# Patient Record
Sex: MALE | Race: BLACK OR AFRICAN AMERICAN | Employment: FULL TIME | URBAN - NONMETROPOLITAN AREA
[De-identification: names, ages, dates, MRNs, and addresses within clinical notes are randomized per-mention and may not be internally consistent; named-entity substitution may affect disease eponyms.]

---

## 2022-04-29 ENCOUNTER — APPOINTMENT (OUTPATIENT)
Dept: CT IMAGING | Age: 22
End: 2022-04-29
Payer: COMMERCIAL

## 2022-04-29 ENCOUNTER — HOSPITAL ENCOUNTER (EMERGENCY)
Age: 22
Discharge: HOME | End: 2022-04-29
Attending: EMERGENCY MEDICINE
Payer: COMMERCIAL

## 2022-04-29 VITALS
SYSTOLIC BLOOD PRESSURE: 140 MMHG | HEART RATE: 80 BPM | RESPIRATION RATE: 18 BRPM | TEMPERATURE: 98.6 F | DIASTOLIC BLOOD PRESSURE: 70 MMHG | OXYGEN SATURATION: 100 %

## 2022-04-29 DIAGNOSIS — M25.551 RIGHT HIP PAIN: Primary | ICD-10-CM

## 2022-04-29 LAB
-: NORMAL
ABSOLUTE EOS #: 0.33 K/UL (ref 0–0.44)
ABSOLUTE IMMATURE GRANULOCYTE: <0.03 K/UL (ref 0–0.3)
ABSOLUTE LYMPH #: 2.08 K/UL (ref 1.2–5.2)
ABSOLUTE MONO #: 0.48 K/UL (ref 0.1–1.4)
ALBUMIN SERPL-MCNC: 4.8 G/DL (ref 3.5–5.2)
ALBUMIN/GLOBULIN RATIO: 1.6 (ref 1–2.5)
ALP BLD-CCNC: 104 U/L (ref 40–129)
ALT SERPL-CCNC: 27 U/L (ref 5–41)
ANION GAP SERPL CALCULATED.3IONS-SCNC: 11 MMOL/L (ref 9–17)
AST SERPL-CCNC: 31 U/L
BASOPHILS # BLD: 1 % (ref 0–2)
BASOPHILS ABSOLUTE: 0.03 K/UL (ref 0–0.2)
BILIRUB SERPL-MCNC: 0.39 MG/DL (ref 0.3–1.2)
BILIRUBIN URINE: NEGATIVE
BUN BLDV-MCNC: 15 MG/DL (ref 6–20)
BUN/CREAT BLD: 21 (ref 9–20)
CALCIUM SERPL-MCNC: 9.5 MG/DL (ref 8.6–10.4)
CHLORIDE BLD-SCNC: 103 MMOL/L (ref 98–107)
CO2: 27 MMOL/L (ref 20–31)
COLOR: YELLOW
CREAT SERPL-MCNC: 0.71 MG/DL (ref 0.7–1.2)
EOSINOPHILS RELATIVE PERCENT: 5 % (ref 1–4)
EPITHELIAL CELLS UA: NORMAL /HPF (ref 0–5)
GFR NON-AFRICAN AMERICAN: ABNORMAL ML/MIN
GFR SERPL CREATININE-BSD FRML MDRD: ABNORMAL ML/MIN/{1.73_M2}
GFR SERPL CREATININE-BSD FRML MDRD: ABNORMAL ML/MIN/{1.73_M2}
GLUCOSE BLD-MCNC: 106 MG/DL (ref 70–99)
GLUCOSE URINE: NEGATIVE
HCT VFR BLD CALC: 41.3 % (ref 40.7–50.3)
HEMOGLOBIN: 14 G/DL (ref 13–17)
IMMATURE GRANULOCYTES: 0 %
KETONES, URINE: NEGATIVE
LEUKOCYTE ESTERASE, URINE: NEGATIVE
LYMPHOCYTES # BLD: 34 % (ref 25–45)
MCH RBC QN AUTO: 29.9 PG (ref 25.2–33.5)
MCHC RBC AUTO-ENTMCNC: 33.9 G/DL (ref 28.4–34.8)
MCV RBC AUTO: 88.1 FL (ref 82.6–102.9)
MONOCYTES # BLD: 8 % (ref 2–8)
NITRITE, URINE: NEGATIVE
NRBC AUTOMATED: 0 PER 100 WBC
PDW BLD-RTO: 11.3 % (ref 11.8–14.4)
PH UA: 6.5 (ref 5–9)
PLATELET # BLD: 209 K/UL (ref 138–453)
PMV BLD AUTO: 10.2 FL (ref 8.1–13.5)
POTASSIUM SERPL-SCNC: 3.9 MMOL/L (ref 3.7–5.3)
PROTEIN UA: NEGATIVE
RBC # BLD: 4.69 M/UL (ref 4.21–5.77)
RBC UA: NORMAL /HPF (ref 0–2)
SEG NEUTROPHILS: 53 % (ref 34–64)
SEGMENTED NEUTROPHILS ABSOLUTE COUNT: 3.26 K/UL (ref 1.8–8)
SODIUM BLD-SCNC: 141 MMOL/L (ref 135–144)
SPECIFIC GRAVITY UA: 1.01 (ref 1.01–1.02)
TOTAL PROTEIN: 7.8 G/DL (ref 6.4–8.3)
TURBIDITY: CLEAR
URINE HGB: NEGATIVE
UROBILINOGEN, URINE: NORMAL
WBC # BLD: 6 K/UL (ref 4.5–13.5)
WBC UA: NORMAL /HPF (ref 0–5)

## 2022-04-29 PROCEDURE — 6360000004 HC RX CONTRAST MEDICATION: Performed by: EMERGENCY MEDICINE

## 2022-04-29 PROCEDURE — 99285 EMERGENCY DEPT VISIT HI MDM: CPT

## 2022-04-29 PROCEDURE — 74177 CT ABD & PELVIS W/CONTRAST: CPT

## 2022-04-29 PROCEDURE — 81001 URINALYSIS AUTO W/SCOPE: CPT

## 2022-04-29 PROCEDURE — 2580000003 HC RX 258: Performed by: EMERGENCY MEDICINE

## 2022-04-29 PROCEDURE — 36415 COLL VENOUS BLD VENIPUNCTURE: CPT

## 2022-04-29 PROCEDURE — 80053 COMPREHEN METABOLIC PANEL: CPT

## 2022-04-29 PROCEDURE — 73701 CT LOWER EXTREMITY W/DYE: CPT

## 2022-04-29 PROCEDURE — 85025 COMPLETE CBC W/AUTO DIFF WBC: CPT

## 2022-04-29 RX ORDER — 0.9 % SODIUM CHLORIDE 0.9 %
1000 INTRAVENOUS SOLUTION INTRAVENOUS ONCE
Status: COMPLETED | OUTPATIENT
Start: 2022-04-29 | End: 2022-04-29

## 2022-04-29 RX ADMIN — SODIUM CHLORIDE 1000 ML: 9 INJECTION, SOLUTION INTRAVENOUS at 01:43

## 2022-04-29 RX ADMIN — IOPAMIDOL 100 ML: 755 INJECTION, SOLUTION INTRAVENOUS at 01:51

## 2022-04-29 ASSESSMENT — PAIN DESCRIPTION - FREQUENCY: FREQUENCY: CONTINUOUS

## 2022-04-29 ASSESSMENT — PAIN DESCRIPTION - LOCATION: LOCATION: HIP

## 2022-04-29 ASSESSMENT — PAIN SCALES - GENERAL: PAINLEVEL_OUTOF10: 9

## 2022-04-29 ASSESSMENT — PAIN DESCRIPTION - ORIENTATION: ORIENTATION: RIGHT

## 2022-04-29 ASSESSMENT — PAIN DESCRIPTION - DESCRIPTORS: DESCRIPTORS: SHARP

## 2022-04-29 ASSESSMENT — PAIN - FUNCTIONAL ASSESSMENT: PAIN_FUNCTIONAL_ASSESSMENT: 0-10

## 2022-04-29 NOTE — ED PROVIDER NOTES
[address] [facility] COMPLAINT   Chief Complaint   Patient presents with    Hip Pain     right, started two weeks ago        HPI   Doren Jeans is a 25 y.o. male who presents with an injury to the right thigh, caused by a sports accident 2 weeks ago. He has been using crutches and started walking a few days ago and been feeling better. He then today after riding in the car for a bit had a lot of pain in his right leg and hip area and felt like he could not get up. Pain was moderately severe and worse with movement. It is also worse with palpation of the thigh or belly. Patient had some recent marijuana usage limiting the exactness of his history. REVIEW OF SYSTEMS   Musculoskeletal: + right thigh pain, no other bony or joint injury   Skin: No lacerations or redness  Neurologic: No numbness or focal extremity weakness      PAST MEDICAL & SURGICAL HISTORY   No past medical history on file. No past surgical history on file. CURRENT MEDICATIONS        ALLERGIES   No Known Allergies     SOCIAL & FAMILY HISTORY   Social History     Socioeconomic History    Marital status: Single     Spouse name: Not on file    Number of children: Not on file    Years of education: Not on file    Highest education level: Not on file   Occupational History    Not on file   Tobacco Use    Smoking status: Not on file    Smokeless tobacco: Not on file   Substance and Sexual Activity    Alcohol use: Not on file    Drug use: Not on file    Sexual activity: Not on file   Other Topics Concern    Not on file   Social History Narrative    Not on file     Social Determinants of Health     Financial Resource Strain:     Difficulty of Paying Living Expenses: Not on file   Food Insecurity:     Worried About Running Out of Food in the Last Year: Not on file    Trip of Food in the Last Year: Not on file   Transportation Needs:     Lack of Transportation (Medical):  Not on file    Lack of Transportation (Non-Medical): Not on file   Physical Activity:     Days of Exercise per Week: Not on file    Minutes of Exercise per Session: Not on file   Stress:     Feeling of Stress : Not on file   Social Connections:     Frequency of Communication with Friends and Family: Not on file    Frequency of Social Gatherings with Friends and Family: Not on file    Attends Sikhism Services: Not on file    Active Member of [address] [place] [address] or Organizations: Not on file    Attends Club or Organization Meetings: Not on file    Marital Status: Not on file   Intimate Partner Violence:     Fear of Current or Ex-Partner: Not on file    Emotionally Abused: Not on file    Physically Abused: Not on file    Sexually Abused: Not on file   Housing Stability:     Unable to Pay for Housing in the Last Year: Not on file    Number of [place] in the Last Year: Not on file    Unstable Housing in the Last Year: Not on file     No family history on file. PHYSICAL EXAM   VITAL SIGNS: BP (!) 140/70   Pulse 80   Temp 98.6 °F (37 °C) (Tympanic)   Resp 18   SpO2 100%   Constitutional: Well developed, well nourished  HENT: Atraumatic, airway patent  NECK: Supple   Respiratory: No respiratory distress, no retractions  Abd: RLQ pain on palpation, non distended  Cardiovascular: No JVD  Musculoskeletal: right upper thigh pain with palpation  Vascular: ext warm and well perfusedIntegument: Well hydrated, no rash   Neurologic: Awake alert, normal flow ofspeech   Psychiatric: Cooperative, pleasant affect     RADIOLOGY/PROCEDURES   CT HIP RIGHT W CONTRAST   Preliminary Result   CT ABDOMEN AND PELVIS: Mildly striated attenuation pattern to the right   kidney upper and lower poles without other abnormality identified to the   right kidney or urinary tract. Possibility for pyelonephritis is raised. Clinical and laboratory correlation suggested. Remainder of the exam is unremarkable. Normal appendix.       CT RIGHT HIP: Unremarkable exam.  No CT findings to explain the patient's   symptoms. CT ABDOMEN PELVIS W IV CONTRAST Additional Contrast? None   Preliminary Result   CT ABDOMEN AND PELVIS: Mildly striated attenuation pattern to the right   kidney upper and lower poles without other abnormality identified to the   right kidney or urinary tract. Possibility for pyelonephritis is raised. Clinical and laboratory correlation suggested. Remainder of the exam is unremarkable. Normal appendix. CT RIGHT HIP: Unremarkable exam.  No CT findings to explain the patient's   symptoms. ED COURSE & MEDICAL DECISION MAKING   Pertinent Imaging studies reviewed and interpreted. (See chart for details)     Differential diagnosis: includes but not limited to Arterial Injury/Ischemia, Fracture, Dislocation, Compartment Syndrome, Neurologic Deficit/Injury. MDM: Patient well-appearing. He was worked up and no cause was found for his pain. I encouraged him to pursue MRI with his primary care doctor. FINAL IMPRESSION   1.  Right hip pain        PLAN:   Outpatient treatment, follow-up, and discharge instructions (see EMR)    Electronically signed by: Bhumi Morgan MD, 4/29/2022 4:06 AM          Bhumi Morgan MD  04/29/22 2028

## 2023-09-29 PROBLEM — R00.1 BRADYCARDIA: Status: ACTIVE | Noted: 2023-09-29

## 2023-09-29 PROBLEM — R42 POSTURAL DIZZINESS: Status: ACTIVE | Noted: 2023-09-29

## 2023-09-29 PROBLEM — I44.1 SECOND DEGREE AV BLOCK: Status: ACTIVE | Noted: 2023-09-29

## 2023-09-29 PROBLEM — S89.91XA INJURY OF RIGHT KNEE: Status: ACTIVE | Noted: 2023-09-29

## 2023-09-29 PROBLEM — R94.31 ABNORMAL EKG: Status: ACTIVE | Noted: 2023-09-29

## 2023-10-02 ENCOUNTER — OFFICE VISIT (OUTPATIENT)
Dept: ORTHOPEDIC SURGERY | Facility: HOSPITAL | Age: 23
End: 2023-10-02
Payer: COMMERCIAL

## 2023-10-02 DIAGNOSIS — S83.512A NEW ACL TEAR, LEFT, INITIAL ENCOUNTER: ICD-10-CM

## 2023-10-02 DIAGNOSIS — M25.462 EFFUSION OF LEFT KNEE: Primary | ICD-10-CM

## 2023-10-02 PROCEDURE — 99204 OFFICE O/P NEW MOD 45 MIN: CPT | Performed by: ORTHOPAEDIC SURGERY

## 2023-10-02 PROCEDURE — 99214 OFFICE O/P EST MOD 30 MIN: CPT | Performed by: ORTHOPAEDIC SURGERY

## 2023-10-02 NOTE — PROGRESS NOTES
Subjective     Diego Prasad is a 22 y.o. male self-referred for evaluation and treatment of left knee pain. The pain began on 9/18 after her was running on turf and his toes got caught on the ground causing his leg to twist and hyperextend. He was playing football but denies being hit by another player. He tried to walk after the injury but it felt unstable so he decided not to. He initially had significant pain and swelling which has been improving with icing. He was wearing a straight leg brace that prevented any bending of the knee but he has since switched a brace that allows a mild ROM. The knee remains painful and stiff with any motion and he has not placed any weight on it without the brace. The pain's location is global. The patient is active in football. Treatment to date has been ice, knee sleeve/brace, with some relief.    He reports a history of prior right knee pain but no prior L knee issues.    Outside reports reviewed: imaging reports: positive  joint(s): left knee(s)  Findings: small effusion .    Objective    There were no vitals taken for this visit.   General:    alert   Gait:  Antalgic. The patient cannot bear weight on the injured extremity.   Left Lower Extremity  Hip Palpation:   no tenderness over the greater trochanter   Hip ROM:  100% of normal    Hamstring tightness reveals a popliteal angle to be  15  degrees   Knee Effusion:   2+   Ecchymosis:    mild   Knee ROM:   -7 to 30 degrees with subpatellar crepitance.   Patella:   Patella does track normally.   Patellar apprehension test: absent   Patellar compression test: absent   Tenderness:     Stability:   Lachman's test: positive   Posterior drawer: negative   Medial collateral ligament: negative but guarding significantly   Lateral collateral ligament: negative  but guarding   Rory Test:   negative   Stewart's Test:   Not able to perform due to limited motion and pain   Sensation:    intact to light touch   Pulses:  normal DP and  PT pulses     Imaging:  X-rays: 4 views of the knee demonstrate  effusion, no fractures, no arthrosis .    Assessment/Plan   Left knee effusion, concern for ACL tear and mensical tearing  Discussed with the patient the complex nature of his condition and options for treatment.  Due to significant effusion and positive Lachman test and mechanism of injury, we recommend getting MRI scan of the left knee without contrast.  I certify the medical necessity of the MRI scan to further evaluate the intra-articular structures which cannot be appropriately evaluated with x-rays.  Recommend use of 2 crutches plus hinged knee brace locked in extension as the patient does not have good leg control yet.  Discussed with him the importance of prehabilitation to regain full range of motion and get the effusion to resolve.  Wrote out physical therapy protocol for rehabilitation.  Follow-up after the MRI to discuss further treatment plans.  Use of NSAIDs for pain as needed.  Use brace at nighttime while sleeping.  Continue using crutches.    Follow up: 1-2 after MRI scan is completed

## 2023-10-03 RX ORDER — SERTRALINE HYDROCHLORIDE 25 MG/1
25 TABLET, FILM COATED ORAL DAILY
COMMUNITY
Start: 2023-03-06 | End: 2023-11-07 | Stop reason: ALTCHOICE

## 2023-10-03 RX ORDER — HYDROCORTISONE ACETATE, PRAMOXINE HCL 2.5; 1 G/100G; G/100G
CREAM TOPICAL
COMMUNITY
Start: 2023-02-10 | End: 2023-11-07 | Stop reason: ALTCHOICE

## 2023-10-06 ENCOUNTER — APPOINTMENT (OUTPATIENT)
Dept: PRIMARY CARE | Facility: CLINIC | Age: 23
End: 2023-10-06
Payer: COMMERCIAL

## 2023-10-16 ENCOUNTER — EVALUATION (OUTPATIENT)
Dept: PHYSICAL THERAPY | Facility: HOSPITAL | Age: 23
End: 2023-10-16
Payer: COMMERCIAL

## 2023-10-16 DIAGNOSIS — R26.2 DIFFICULTY WALKING: ICD-10-CM

## 2023-10-16 DIAGNOSIS — R29.898 LOWER EXTREMITY WEAKNESS: ICD-10-CM

## 2023-10-16 DIAGNOSIS — R29.898 WEAKNESS OF LOWER EXTREMITY, UNSPECIFIED LATERALITY: ICD-10-CM

## 2023-10-16 DIAGNOSIS — S83.512A NEW ACL TEAR, LEFT, INITIAL ENCOUNTER: ICD-10-CM

## 2023-10-16 DIAGNOSIS — M62.559 ATROPHY OF QUADRICEPS FEMORIS MUSCLE: ICD-10-CM

## 2023-10-16 DIAGNOSIS — M25.562 ACUTE PAIN OF LEFT KNEE: Primary | ICD-10-CM

## 2023-10-16 DIAGNOSIS — M25.469 KNEE SWELLING: ICD-10-CM

## 2023-10-16 DIAGNOSIS — R26.89 BALANCE PROBLEMS: ICD-10-CM

## 2023-10-16 DIAGNOSIS — M25.662 DECREASED RANGE OF MOTION OF LEFT KNEE: ICD-10-CM

## 2023-10-16 DIAGNOSIS — M25.462 EFFUSION OF LEFT KNEE: ICD-10-CM

## 2023-10-16 PROCEDURE — 97016 VASOPNEUMATIC DEVICE THERAPY: CPT | Mod: GP

## 2023-10-16 PROCEDURE — 97161 PT EVAL LOW COMPLEX 20 MIN: CPT | Mod: GP

## 2023-10-16 PROCEDURE — 97140 MANUAL THERAPY 1/> REGIONS: CPT | Mod: GP

## 2023-10-16 PROCEDURE — 97110 THERAPEUTIC EXERCISES: CPT | Mod: GP

## 2023-10-16 ASSESSMENT — ENCOUNTER SYMPTOMS
LOSS OF SENSATION IN FEET: 0
DEPRESSION: 0
OCCASIONAL FEELINGS OF UNSTEADINESS: 0

## 2023-10-16 ASSESSMENT — PAIN SCALES - GENERAL: PAINLEVEL_OUTOF10: 6

## 2023-10-16 ASSESSMENT — PAIN - FUNCTIONAL ASSESSMENT: PAIN_FUNCTIONAL_ASSESSMENT: 0-10

## 2023-10-16 NOTE — Clinical Note
October 16, 2023     Patient: Diego Prasad   YOB: 2000   Date of Visit: 10/16/2023       To Whom It May Concern:    It is my medical opinion that Diego Prasad {Work release (duty restriction):16255}.    If you have any questions or concerns, please don't hesitate to call.         Sincerely,        Enriqueta Almonte, PT    CC: No Recipients

## 2023-10-16 NOTE — PROGRESS NOTES
Physical Therapy  Physical Therapy Orthopedic Evaluation    Patient Name: Diego Prasad  MRN: [id number]  Today's Date: 10/16/2023       Insurance:  Visit number: 1 of 22 (25 visits/year, has used 3)  Authorization info: no auth  Insurance Type: [facility] choice+ vaso ok    General:  Reason for visit: L knee injury  Referred by: Dr. Marquez    Current Problem  1. Acute pain of left knee  Follow Up In Physical Therapy      2. Atrophy of quadriceps femoris muscle  Follow Up In Physical Therapy      3. Lower extremity weakness  Follow Up In Physical Therapy      4. Decreased range of motion of left knee  Follow Up In Physical Therapy      5. Difficulty walking  Follow Up In Physical Therapy      6. Balance problems  Follow Up In Physical Therapy      7. Knee swelling  Follow Up In Physical Therapy          Precautions: n/a       Medical History Form: Reviewed (scanned into chart)    Subjective:   Subjective   Chief Complaint: Patient presents to clinic s/p L knee injury, noncontact hyperextension injury, felt a pop, had immediate swelling. Had buckling of knee originally, but not since wearing brace and keeping weight  off knee. Swelling has improved, still generally having pain especially in posterior knee, has been keeping knee mostly in extension, using crutches, has been keeping weight off it. Functional  limitations include stairs, transfers, ambulation, bending knee. Is getting a little paresthesia, coldness in the foot, also having discomfort from brace in shin. Has taken NSAIDs for the injury, not much anymore.  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 No  Fever/chills, unexplained weight changes, dizziness/fainting, unexplained change in bowel or bladder functions, unexplained malaise or muscle weakness, night pain/sweats, numbness or tingling    Objective:  Objective       ROM    Knee AROM (Degrees)      (R)  (L)  Flexion: 137  -2      Extension: 7  30         Knee PROM (Degrees)      (R)  (L)  Flexion:         Extension:          Effusion: trace      Strength Testing    Formal strength testing deferred due to acute injury status        Functional Screening  Functional movements deferred due to acute injury    Palpation: patellar tendon, medial aspect of knee LLE      Patella Mobility: restricted multidirectional glide L      Orthotics: hinged knee brace, minimal extension allowed      Gait: enters NWB with carmita axillary crutches          Special Tests  Ligament Tests:   Lachman Test: + L, - R   Valgus Stress Test: + L (laxity), - R   Varus Stress Test: - L/R   Valgus/varus stress test at 0deg ext: - L/R  Meniscus   Stewart Test: - R, unable to test L      Outcome Measures:  Other Measures  Lower Extremity Funtional Score (LEFS): 13     EDUCATION: home exercise program, plan of care, activity modifications, pain management, and injury pathology       Goals: Set and discussed today  Active       PT Problem       PT Goal 1       Start:  10/16/23    Expected End:  01/08/24       Short Term Goals (To be met within 2-10 weeks):  1.  10/16/2023 Pt will demo understanding of and compliance with HEP to progress towards long term goal.  2. 10/16/2023 The patient will demonstrate full active knee extension equivalent to the uninvolved side to improve quality of gait, quad activation, and progression towards long term goals.  3. 10/16/2023 Pt will demo ability to perform 10 SLR without quad lag to progress towards long term goal.  4. 10/16/2023 Pt will demo decreased swelling to +1 or less as assessed with stroke test to progress towards long term goals.  5. 10/16/2023 Pt will demo normalized gait pattern with/without use of assistive device to progress towards long term goals           PT Goal 2       Start:  10/16/23    Expected End:  10/16/24       Long Term Goals (12+ weeks and on)  10/16/2023 The patient will demonstrate >70% LSI isometric quad strength assessed at 60 deg flexion compared to the uninvolved side at 12-16 weeks post op.  2. 10/16/2023 Pt will be independent and compliant with home program in order to safely return to sport.  3. 10/16/2023 Pt will increase LEFS outcome score to >90 pt's to demonstrate improved functional mobility for performance of ADL's and IADL's.  4. 10/16/2023 ROM: full, pain free knee ROM, symmetrical with the uninvolved limb in order to safely return to sport.  5. 10/16/2023 Strength: Isokinetic testing 90% or greater for hamstring and quad at 60º/sec and 300º/sec in order to safely return to sport.  6. 10/16/2023 Effusion: No reactive effusion >1+ with sport-specific activity in order to safely return to sport.  7. 10/16/2023 Functional Hop Testing: LSI 90% with appropriate mechanics and force attenuation strategies for all tests in order to safely return to sport.  8. 10/16/2023 Pt will score <17 on TSK-11 and score >77 on ACL-RSI to demonstrate appropriate psychological readiness for RTS without risk of reinjury.             Plan of care was developed with input and agreement by the patient      Treatment  "Performed:    Therapeutic Exercise:    29 min  Short sitting knee flexion with options for self overpressure or active hamstring contraction x15  Heel slides with strap x15  Quad set in heel prop 10\" hold x20  Pt/family education regarding rehab priorities, exam findings, ice/heat parameters and recommendations for usage, brace use for WB only  Brace adjustment/fitting  Gait training in clinic with cues for WB and heel/toe pattern    Manual Therapy:    15 min  STM to hamstrings, popliteus, gastroc, quad, patellar tendon    Neuromuscular Re-education:   min      Other:     10 min  Vaso cold temp mod compression x10 in      Assessment:   The patient presents with signs/symptoms consistent with ligamentous knee injury based on laxity with lachman and valgus stress, with impairments including knee pain, effusion, decreased range of motion, decreased quad and lower extremity strength, and gait difficulties.  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   The patient's prognosis may be impacted by difficulty achieving full knee flexion and extension and initially high pain levels.  Patient will continue with physical therapy intervention and further orthopedic management will continue pending results from MRI and follow up with orthopedic surgeon.  The patient will benefit from skilled therapy to address the above impairments and return them to full participation in the above activities at their prior level of function.        Plan:     Planned Interventions include: therapeutic exercise, self-care home management, manual therapy, therapeutic activities, gait training, neuromuscular coordination, " vasopneumatic, dry needling, aquatic therapy  Frequency: 1-2 x Week  Duration: 9 Months    HEP: [id number]    Enriqueta Almonte, PT

## 2023-10-16 NOTE — Clinical Note
October 16, 2023     Patient: Diego Prasad   YOB: 2000   Date of Visit: 10/16/2023       To Whom it May Concern:    Diego Prasad was seen in my clinic on 10/16/2023. He {Return to school/sport:09560}.    If you have any questions or concerns, please don't hesitate to call.         Sincerely,          Enriqueta Almonte, PT        CC: No Recipients

## 2023-10-17 ENCOUNTER — ANCILLARY PROCEDURE (OUTPATIENT)
Dept: RADIOLOGY | Facility: CLINIC | Age: 23
End: 2023-10-17
Payer: COMMERCIAL

## 2023-10-17 DIAGNOSIS — S83.512A NEW ACL TEAR, LEFT, INITIAL ENCOUNTER: ICD-10-CM

## 2023-10-17 DIAGNOSIS — M25.462 EFFUSION OF LEFT KNEE: ICD-10-CM

## 2023-10-17 PROCEDURE — 73721 MRI JNT OF LWR EXTRE W/O DYE: CPT | Mod: LT

## 2023-10-17 PROCEDURE — 73721 MRI JNT OF LWR EXTRE W/O DYE: CPT | Mod: LEFT SIDE | Performed by: RADIOLOGY

## 2023-10-20 ENCOUNTER — APPOINTMENT (OUTPATIENT)
Dept: PHYSICAL THERAPY | Facility: CLINIC | Age: 23
End: 2023-10-20
Payer: COMMERCIAL

## 2023-10-20 ENCOUNTER — TREATMENT (OUTPATIENT)
Dept: PHYSICAL THERAPY | Facility: HOSPITAL | Age: 23
End: 2023-10-20
Payer: COMMERCIAL

## 2023-10-20 DIAGNOSIS — M25.662 DECREASED RANGE OF MOTION OF LEFT KNEE: ICD-10-CM

## 2023-10-20 DIAGNOSIS — R29.898 LOWER EXTREMITY WEAKNESS: ICD-10-CM

## 2023-10-20 DIAGNOSIS — R26.89 BALANCE PROBLEMS: ICD-10-CM

## 2023-10-20 DIAGNOSIS — M62.559 ATROPHY OF QUADRICEPS FEMORIS MUSCLE: ICD-10-CM

## 2023-10-20 DIAGNOSIS — M25.562 ACUTE PAIN OF LEFT KNEE: Primary | ICD-10-CM

## 2023-10-20 DIAGNOSIS — R26.2 DIFFICULTY WALKING: ICD-10-CM

## 2023-10-20 DIAGNOSIS — M25.469 KNEE SWELLING: ICD-10-CM

## 2023-10-20 PROCEDURE — 97110 THERAPEUTIC EXERCISES: CPT | Mod: GP

## 2023-10-20 PROCEDURE — 97140 MANUAL THERAPY 1/> REGIONS: CPT | Mod: GP

## 2023-10-20 PROCEDURE — 97112 NEUROMUSCULAR REEDUCATION: CPT | Mod: GP

## 2023-10-20 ASSESSMENT — PAIN - FUNCTIONAL ASSESSMENT: PAIN_FUNCTIONAL_ASSESSMENT: 0-10

## 2023-10-20 ASSESSMENT — PAIN SCALES - GENERAL: PAINLEVEL_OUTOF10: 5 - MODERATE PAIN

## 2023-10-20 NOTE — PROGRESS NOTES
Physical Therapy  Physical Therapy Treatment Note    Patient Name: Diego Prasad  MRN: [id number]  Today's Date: 10/20/2023       Insurance:  Visit number: 2 of 22 (25 visits/year, has used 3 previously)  Authorization info: no auth  Insurance Type: [facility] choice+ vaso ok    General:  Reason for visit: L knee injury  Referred by: Dr. Marquez    Current Problem  1. Acute pain of left knee        2. Atrophy of quadriceps femoris muscle        3. Lower extremity weakness        4. Decreased range of motion of left knee        5. Difficulty walking        6. Balance problems        7. Knee swelling            Precautions: n/a      Subjective:     Patient reports knee is feeling a little better; no issues with HEP. Still not putting much weight on his knee, flexion isn't very comfortable still.    Pain  Pain Assessment: 0-10  Pain Score: 5 - Moderate pain    Performing HEP?: Yes      Objective:                           ROM     Knee AROM (Degrees)                             (R)                    (L)  Flexion:            137                   38                                           Extension:        7                        -2  (improvement to +2 post intervention)                                       Knee PROM (Degrees)                             (R)                    (L)  Flexion:                                                                                      Extension:                                                                         Effusion: trace                             Strength Testing     Formal strength testing deferred due to acute injury status                                Functional Screening  Functional movements deferred due to acute injury     Palpation: patellar tendon, medial aspect of knee LLE        Patella Mobility: restricted multidirectional glide L        Orthotics: hinged knee brace, minimal extension allowed        Gait: enters NWB with carmita axillary crutches                                    Special Tests  Ligament Tests:              Lachman Test: + L, - R              Valgus Stress Test: + L (laxity), - R              Varus Stress Test: - L/R              Valgus/varus stress test at 0deg ext: - L/R  Meniscus              Stewart Test: - R, unable to test L      Treatment Performed:    Therapeutic Exercise:    26 min  SLR x30 breaks PRN  Short sitting flexion HS curl x15  Heel slides + strap x15    Manual Therapy:    25 min  STM, IASTM to distal hamstrings, popliteus  STM to gracilis  Patellar mobs all directions, grade IV superior    Neuromuscular Re-education:  20 min  Mtrigger biofeedback 10/10 on/off   Quad sets in heel prop goal 300 x5 min  Quad sets over towel roll goal 600-700 x5 min  Ambulation in clinic with cues for crutch sequencing, WB heel to toe    Other:     20 min  Vaso cold temp low compression x18 minutes (includes set up)      Assessment:   Pt presented with improved knee extension and flexion range of motion following treatment, able to passively achieve 5deg of hyperextension. Improved quad activation noted during quad sets with biofeedback. Minimal lag present during SLR, likely due to deficit in quad activation. Continues to have difficulty and apprehension with WB, able to demonstrate slight heel to toe pattern with bilateral crutches and significant verbal cueing. Cueing utilized for crutch/limb sequencing and gait pattern; pt noted decreased difficulty with WB when using brace.      Plan:  Trial bike for warm up if able  Continue with manual for extension, biofeedback for quad activation and add SLR to biofeedback training  Improve weightbearing, gait training (weight shifts, balance variations, mini squats, consider shuttle)      Enriqueta Almonte, PT

## 2023-10-23 ENCOUNTER — TREATMENT (OUTPATIENT)
Dept: PHYSICAL THERAPY | Facility: HOSPITAL | Age: 23
End: 2023-10-23
Payer: COMMERCIAL

## 2023-10-23 ENCOUNTER — OFFICE VISIT (OUTPATIENT)
Dept: ORTHOPEDIC SURGERY | Facility: HOSPITAL | Age: 23
End: 2023-10-23
Payer: COMMERCIAL

## 2023-10-23 DIAGNOSIS — M25.562 ACUTE PAIN OF LEFT KNEE: Primary | ICD-10-CM

## 2023-10-23 DIAGNOSIS — M25.662 DECREASED RANGE OF MOTION OF LEFT KNEE: ICD-10-CM

## 2023-10-23 DIAGNOSIS — R26.89 BALANCE PROBLEMS: ICD-10-CM

## 2023-10-23 DIAGNOSIS — M25.469 KNEE SWELLING: ICD-10-CM

## 2023-10-23 DIAGNOSIS — S83.282A ACUTE LATERAL MENISCUS TEAR OF LEFT KNEE, INITIAL ENCOUNTER: ICD-10-CM

## 2023-10-23 DIAGNOSIS — S83.512A RUPTURE OF ANTERIOR CRUCIATE LIGAMENT OF LEFT KNEE, INITIAL ENCOUNTER: ICD-10-CM

## 2023-10-23 DIAGNOSIS — S83.512A RUPTURE OF ANTERIOR CRUCIATE LIGAMENT OF LEFT KNEE, INITIAL ENCOUNTER: Primary | ICD-10-CM

## 2023-10-23 DIAGNOSIS — R29.898 LOWER EXTREMITY WEAKNESS: ICD-10-CM

## 2023-10-23 DIAGNOSIS — M62.559 ATROPHY OF QUADRICEPS FEMORIS MUSCLE: ICD-10-CM

## 2023-10-23 DIAGNOSIS — R26.2 DIFFICULTY WALKING: ICD-10-CM

## 2023-10-23 PROCEDURE — 97140 MANUAL THERAPY 1/> REGIONS: CPT | Mod: GP

## 2023-10-23 PROCEDURE — 99214 OFFICE O/P EST MOD 30 MIN: CPT | Performed by: ORTHOPAEDIC SURGERY

## 2023-10-23 PROCEDURE — 97016 VASOPNEUMATIC DEVICE THERAPY: CPT | Mod: GP

## 2023-10-23 PROCEDURE — 97110 THERAPEUTIC EXERCISES: CPT | Mod: GP

## 2023-10-23 ASSESSMENT — PAIN - FUNCTIONAL ASSESSMENT: PAIN_FUNCTIONAL_ASSESSMENT: 0-10

## 2023-10-23 ASSESSMENT — PAIN SCALES - GENERAL: PAINLEVEL_OUTOF10: 5 - MODERATE PAIN

## 2023-10-23 NOTE — PATIENT INSTRUCTIONS
We discussed surgery to do scope-assisted ACL reconstruction using your graft (bone-patellar-bone).  You also have a tear of the meniscus cartilage (lateral one) that may require stitching.  If we have to repair the meniscus, then you will have to keep weight off of your left leg for the first 3-4 weeks after surgery.  If we clip out the tear, then you can walk on your leg with brace and crutches.  Please do not eat or drink anything after midnight the night before surgery.  Please call Danyell Becker [phone] to schedule surgery.

## 2023-10-23 NOTE — PROGRESS NOTES
This is a pleasant 23 y.o. year old male who presents for fuv of  left knee  pain and effusion.  Interventions: He has since undergone MRI of right knee.  He is doing prehab exercises.    PHYSICAL EXAMINATION  Constitutional Exam: patient's height and weight reviewed, well-kempt  Psychiatric Exam: alert and oriented x 3, appropriate mood and behavior  Eye Exam: CODY, EOMI  Pulmonary Exam: breathing non-labored, no apparent distress  Lymphatic exam: no appreciable lymphadenopathy in the lower extremities  Cardiovascular exam: DP pulses 2+ bilaterally, PT 2+ bilaterally, toes are pink with good capillary refill, no pitting edema  Skin exam: no open lesions, rashes, abrasions or ulcerations  Neurological exam: sensation to light touch intact in both lower extremities in peripheral and dermatomal distributions (except for any abnormalities noted in musculoskeletal exam)    Musculoskeletal exam: left knee: able to nearly fully straighten, flexion to 45 degrees, effusion 2+, stable varus-valgus stress testing, positive lachman    DATA/RESULTS REVIEW: I personally reviewed the patient's x-ray images and reports of the  left knee showing ACL rupture, radial tear of lateral meniscus anterior body portion, pivot shift bone edema pattern.  Other ligaments intact, medial meniscus appears intact .     ASSESSMENT: left knee ACL rupture, lateral mensical tear radial  PLAN: Further treatment options discussed including recommendation for continued PT  in order to resolve the acute inflammation of the knee and improve knee ROM in preparation for surgery and to avoid arthrofibrosis.  Discussed with patient and his mom that it can take weeks for the acute inflammation to resolve and knee be ready for surgery.  He will continue with PT and home exercise program TID.  FUV in 2 weeks to make sure knee ready for surgery.  Discussed with patient that surgery would be left knee scope assisted ACL reconstruction with bone patellar bone  autograft.  Meniscal repair likely needed for radial tear of lateral meniscus; however, if tissue quality is poor or if tear in inner rim where blood flow not good then partial menisectomy would have to be performed.  Chirag out a picture for him and his mom of what the procedure entails, hardware used and other questions after reviewing the MRI images.  Discussed advantages and disadvantages of different types of grafts and BPB autograft selected.  We would not recommend nonoperative management of his condition due to risks of recurrent instability and risks of further meniscal damage which significantly decreases long term prognosis of left knee function.  The patient's questions were answered in detail.      I discussed with patient the procedure in detail, risks and benefits of procedure, post-op protocol, anesthetic used with possible regional block, timeline of recovery (6-9 months for full recovery), need for protective weightbearing and/or bracing after procedure, pain management protocol, DVT prophylaxis protocol, home preparation suggestions, pre-op surgery preparation, and other pertinent information.    Note dictated with [employer] software, completed without full type editing to avoid delay.

## 2023-10-23 NOTE — PROGRESS NOTES
Physical Therapy  Physical Therapy Treatment Note    Patient Name: Diego Prasad  MRN: [id number]  Today's Date: 10/23/2023  Time Calculation  Start Time: 1523  Stop Time: 1652  Time Calculation (min): 89 min    Insurance:  Visit number: 3 of 22 (25 visits/year, has used 3 previously)  Authorization info: no auth  Insurance Type: [employer] choice+ vaso ok    General:  Reason for visit: L knee injury  Referred by: Dr. Marquez    Current Problem  1. Acute pain of left knee        2. Atrophy of quadriceps femoris muscle        3. Lower extremity weakness        4. Decreased range of motion of left knee        5. Difficulty walking        6. Balance problems        7. Knee swelling              Precautions: n/a      Subjective:     Had follow up with Dr. Marquez, and is planning for ACL surgery with potential meniscus repair/debridement. Notes most difficulty putting weight through his injured leg.    Pain  Pain Assessment: 0-10  Pain Score: 5 - Moderate pain (not rated this session due to focus on functional goals)    Performing HEP?: Yes      Objective:                           ROM     Knee AROM (Degrees)                             (R)                    (L)  Flexion:            137                   55                                           Extension:        7                        -2  (improvement to +2 post intervention)                                       Knee PROM (Degrees)                             (R)                    (L)  Flexion:                                                                                      Extension:                                                                         Effusion: trace                             Strength Testing     Formal strength testing deferred due to acute injury status                                Functional Screening  Functional movements deferred due to acute injury     Palpation: patellar tendon, medial aspect of knee LLE        Patella  "Mobility: restricted multidirectional glide L        Orthotics: hinged knee brace, minimal extension allowed        Gait: enters NWB with carmita axillary crutches                                   Special Tests  Ligament Tests:              Lachman Test: + L, - R              Valgus Stress Test: + L (laxity), - R              Varus Stress Test: - L/R              Valgus/varus stress test at 0deg ext: - L/R  Meniscus              Stewart Test: - R, unable to test L      Treatment Performed:    Therapeutic Exercise:    54 min  Short sitting flexion HS curl x15  Heel slides + strap x15  Knee extension prop 3lbs 10\" on/off quad sets  PB knee flexion roll in x20  SAQ 3x10  Medial lateral weight shift x20  Ambulation in clinic with cues for crutch sequencing, WB heel to toe  NP:  SLR x30 breaks PRN    Manual Therapy:    15 min  STM to distal hamstrings, anterior knee, patellar tendon  Patellar mobs all directions, grade IV superior, inferior    Neuromuscular Re-education:  0 min  Mtrigger biofeedback 10/10 on/off (NP)  Quad sets in heel prop goal 300 x5 min  Quad sets over towel roll goal 600-700 x5 min    Other:     20 min  Vaso cold temp low compression x18 minutes (includes set up)      Assessment:   Pt demonstrated apprehension and knee discomfort during mobility work and weightbearing this session. Demonstrated improved pre session flexion and extension mobility, with additional minimal improvement noted throughout session but with persistence of quad lag during open chain exercises. Unable to bear full weight on injured LE, able to attain partial WB with UE assist. Additional time utilized for rest breaks due to knee discomfort during exercises.  The patient will continue to benefit from skilled therapeutic intervention to regain knee range of motion, quad function, and normalization of weightbearing and gait prior to surgical intervention per follow up with ortho MD.      Plan:  Trial bike for warm up if able  Gait " training (consider alter G), WB tolerance  (weight shifts, balance variations, mini squats, consider shuttle)  Quad activation - NMES, biofeedback      Enriqueta Almonte, PT

## 2023-10-25 NOTE — PROGRESS NOTES
Physical Therapy  Physical Therapy Treatment Note    Patient Name: Diego Prasad  MRN: [id number]  Today's Date: 10/26/2023  Time Calculation  Start Time: 1037  Stop Time: 1213  Time Calculation (min): 96 min    Insurance:  Visit number: 4 of 22 (25 visits/year, has used 3 previously)  Authorization info: no auth  Insurance Type: C choice+ vaso ok    General:  Reason for visit: L knee injury  Referred by: Dr. Marquez    Current Problem  1. Acute pain of left knee        2. Atrophy of quadriceps femoris muscle        3. Lower extremity weakness        4. Decreased range of motion of left knee        5. Difficulty walking        6. Balance problems        7. Knee swelling                Precautions: n/a      Subjective:   Pt reports he has been working on putting a little more weight on his leg when walking. Knee has felt okay, gets some posterior proximal calf pain when leg is propped up in extension, thinks maybe the pressure from his brace is aggravating it.  Pain  Pain Assessment: 0-10  Pain Score: 5 - Moderate pain (not rated this session due to focus on functional goals)    Performing HEP?: Yes      Objective:                           ROM     Knee AROM (Degrees)                             (R)                    (L)  Flexion:            137                   55                                           Extension:        7                        0                                    Knee PROM (Degrees)                             (R)                    (L)  Flexion:                                                                                      Extension:                                 +6 (able to activate in heel prop)                                        Effusion: trace                             Strength Testing     Formal strength testing deferred due to acute injury status                                Functional Screening  Functional movements deferred due to acute injury     Palpation:  "patellar tendon, medial aspect of knee LLE        Patella Mobility: restricted multidirectional glide L        Orthotics: hinged knee brace, minimal extension allowed        Gait: enters NWB with carmita axillary crutches                                   Special Tests  Ligament Tests:              Lachman Test: + L, - R              Valgus Stress Test: + L (laxity), - R              Varus Stress Test: - L/R              Valgus/varus stress test at 0deg ext: - L/R  Meniscus              Stewart Test: - R, unable to test L      Treatment Performed:    Therapeutic Exercise:    36 min  Short sitting flexion HS curl x15  Assisted knee flexion with slider in short sitting x15  Heel slides + strap x20  NP:  Knee extension prop 3lbs 10\" on/off quad sets  PB knee flexion roll in x20  SAQ 3x10  Medial lateral weight shift x20  SLR x30 breaks PRN    Manual Therapy:    15 min  Patellar mobs inferior   STM to distal hamstrings, anterior knee, patellar tendon, lateral quad/ITB    Neuromuscular Re-education:  0 min  Mtrigger biofeedback 10/10 on/off (NP)  Quad sets in heel prop goal 300 x5 min  Quad sets over towel roll goal 600-700 x5 min    Other: gait training; vaso    min  (25) Gait training:  Alter G 40-60% BW visual/data cues for heel strike, knee flexion (includes time for set up)  Ambulation practice in clinic with cues for heel strike, knee flexion  Alter G 50% WB SL balance 4x15\" UE support as needed  (20) Vaso cold temp low compression x20 minutes (includes set up)      Assessment:   Pt demonstrated improved percentage of WB on injured extremity during gait training in AlterG, evidenced by gait observation and data readout on alterG. Pt also reported subjective improvement in ease of gait after alterG training. Pt able to better initiate heel strike in decreased BW environment and demonstrated minimal active flexion with brace unlocked to 45deg; still limits full WB through midstance as evidenced by transfer of weight to " non-injured extremity.      Plan:  Gait training in Alter G  Biofeedback for quad strength/activation (quad sets, other variations)  Flexion mobility if time      Enriqueta Almonte, PT

## 2023-10-26 ENCOUNTER — TREATMENT (OUTPATIENT)
Dept: PHYSICAL THERAPY | Facility: HOSPITAL | Age: 23
End: 2023-10-26
Payer: COMMERCIAL

## 2023-10-26 DIAGNOSIS — M25.662 DECREASED RANGE OF MOTION OF LEFT KNEE: ICD-10-CM

## 2023-10-26 DIAGNOSIS — R26.2 DIFFICULTY WALKING: ICD-10-CM

## 2023-10-26 DIAGNOSIS — M25.562 ACUTE PAIN OF LEFT KNEE: Primary | ICD-10-CM

## 2023-10-26 DIAGNOSIS — M25.469 KNEE SWELLING: ICD-10-CM

## 2023-10-26 DIAGNOSIS — M62.559 ATROPHY OF QUADRICEPS FEMORIS MUSCLE: ICD-10-CM

## 2023-10-26 DIAGNOSIS — R29.898 LOWER EXTREMITY WEAKNESS: ICD-10-CM

## 2023-10-26 DIAGNOSIS — R26.89 BALANCE PROBLEMS: ICD-10-CM

## 2023-10-26 PROCEDURE — 97110 THERAPEUTIC EXERCISES: CPT | Mod: GP

## 2023-10-26 PROCEDURE — 97016 VASOPNEUMATIC DEVICE THERAPY: CPT | Mod: GP

## 2023-10-26 PROCEDURE — 97116 GAIT TRAINING THERAPY: CPT | Mod: GP

## 2023-10-26 PROCEDURE — 97140 MANUAL THERAPY 1/> REGIONS: CPT | Mod: GP

## 2023-10-26 ASSESSMENT — PAIN - FUNCTIONAL ASSESSMENT: PAIN_FUNCTIONAL_ASSESSMENT: 0-10

## 2023-10-26 ASSESSMENT — PAIN SCALES - GENERAL: PAINLEVEL_OUTOF10: 5 - MODERATE PAIN

## 2023-10-30 ENCOUNTER — TREATMENT (OUTPATIENT)
Dept: PHYSICAL THERAPY | Facility: HOSPITAL | Age: 23
End: 2023-10-30
Payer: COMMERCIAL

## 2023-10-30 DIAGNOSIS — M62.559 ATROPHY OF QUADRICEPS FEMORIS MUSCLE: ICD-10-CM

## 2023-10-30 DIAGNOSIS — R29.898 LOWER EXTREMITY WEAKNESS: ICD-10-CM

## 2023-10-30 DIAGNOSIS — M25.662 DECREASED RANGE OF MOTION OF LEFT KNEE: ICD-10-CM

## 2023-10-30 DIAGNOSIS — M25.469 KNEE SWELLING: ICD-10-CM

## 2023-10-30 DIAGNOSIS — R26.89 BALANCE PROBLEMS: ICD-10-CM

## 2023-10-30 DIAGNOSIS — R26.2 DIFFICULTY WALKING: ICD-10-CM

## 2023-10-30 DIAGNOSIS — M25.562 ACUTE PAIN OF LEFT KNEE: Primary | ICD-10-CM

## 2023-10-30 PROCEDURE — 97140 MANUAL THERAPY 1/> REGIONS: CPT | Mod: GP

## 2023-10-30 PROCEDURE — 97116 GAIT TRAINING THERAPY: CPT | Mod: GP

## 2023-10-30 PROCEDURE — 97110 THERAPEUTIC EXERCISES: CPT | Mod: GP

## 2023-10-30 NOTE — PROGRESS NOTES
Physical Therapy  Physical Therapy Treatment Note    Patient Name: Diego Prasad  MRN: [id number]  Today's Date: 10/30/2023  Time Calculation  Start Time: 0935  Stop Time: 1100  Time Calculation (min): 85 min    Insurance:  Visit number: 5 of 22 (25 visits/year, has used 3 previously)  Authorization info: no auth  Insurance Type: C choice+ vaso ok    General:  Reason for visit: L knee injury  Referred by: Dr. Marquez    Current Problem  1. Acute pain of left knee        2. Atrophy of quadriceps femoris muscle        3. Lower extremity weakness        4. Decreased range of motion of left knee        5. Difficulty walking        6. Balance problems        7. Knee swelling                  Precautions: n/a      Subjective:   Pt reports he has been having a lot of soreness in the lateral thigh/hip, near where the top strap of his brace is, was able to get some relief by re-locking the brace to walk. Still having soreness in the medial calf as well.   Pain  Pain Assessment: 0-10  Pain Score: 5 - Moderate pain (not rated this session due to focus on functional goals)    Performing HEP?: Yes      Objective:                           ROM     Knee AROM (Degrees)                             (R)                    (L)  Flexion:            137                   55                                           Extension:        7                        0                                    Knee PROM (Degrees)                             (R)                    (L)  Flexion:                                                                                      Extension:                                 +6 (able to activate in heel prop)                                        Effusion: trace                             Strength Testing     Formal strength testing deferred due to acute injury status                                Functional Screening  Functional movements deferred due to acute injury     Palpation: patellar  "tendon, medial aspect of knee LLE        Patella Mobility: restricted multidirectional glide L        Orthotics: hinged knee brace, minimal extension allowed        Gait: enters NWB with carmita axillary crutches                                   Special Tests  Ligament Tests:              Lachman Test: + L, - R              Valgus Stress Test: + L (laxity), - R              Varus Stress Test: - L/R              Valgus/varus stress test at 0deg ext: - L/R  Meniscus              Stewart Test: - R, unable to test L      Treatment Performed:    Therapeutic Exercise:    24 min  Short sitting flexion HS curl x15  Prone quad stretch with strap 10\" x10  NP:  Knee extension prop 3lbs 10\" on/off quad sets  PB knee flexion roll in x20  SAQ 3x10  Medial lateral weight shift x20  SLR x30 breaks PRN    Manual Therapy:    20 min  STM medial calf  Theragun to lateral quad, ITB, TFL  Prone flexion overpressure with PNF hold/relax for quads, hamstrings    Neuromuscular Re-education:  6 min  Mtrigger biofeedback 10/10 on/off  Quad sets in heel prop goal 300 x6 min  Quad sets over towel roll goal 600-700 x5 min (NP)    Other: gait training; vaso    15; 20min  (15) Gait training:  Alter G 40-60% BW visual/data cues for heel strike, knee flexion (includes time for set up)  Ambulation practice in clinic with cues for heel strike, knee flexion  Alter G 50% WB SL balance 4x15\" UE support as needed (NP)  (20) Vaso cold temp low compression x20 minutes (includes set up)      Assessment:   Pt demonstrated improved flexion mobility overall during session, but still has lots of guarding from quads/hamstring during motion of the knee. Pt developing increased soreness throughout the lower extremity likely due to maintenance of guarding and difficulty weightbearing secondary to knee pain and apprehension.      Plan:  Quad strength training - biofeedback, NMES, consider SAQ, LAQ  Gait training in Alter G as able  Flexion mobility if time      Enriqueta JONES" Suzy, PT

## 2023-11-01 ASSESSMENT — PAIN - FUNCTIONAL ASSESSMENT: PAIN_FUNCTIONAL_ASSESSMENT: 0-10

## 2023-11-01 ASSESSMENT — PAIN SCALES - GENERAL: PAINLEVEL_OUTOF10: 5 - MODERATE PAIN

## 2023-11-02 ENCOUNTER — PREP FOR PROCEDURE (OUTPATIENT)
Dept: ORTHOPEDIC SURGERY | Facility: CLINIC | Age: 23
End: 2023-11-02

## 2023-11-02 ENCOUNTER — TREATMENT (OUTPATIENT)
Dept: PHYSICAL THERAPY | Facility: HOSPITAL | Age: 23
End: 2023-11-02
Payer: COMMERCIAL

## 2023-11-02 DIAGNOSIS — S83.512A RUPTURE OF ANTERIOR CRUCIATE LIGAMENT OF LEFT KNEE, INITIAL ENCOUNTER: Primary | ICD-10-CM

## 2023-11-02 DIAGNOSIS — R26.89 BALANCE PROBLEMS: ICD-10-CM

## 2023-11-02 DIAGNOSIS — S83.282A ACUTE LATERAL MENISCUS TEAR OF LEFT KNEE, INITIAL ENCOUNTER: ICD-10-CM

## 2023-11-02 DIAGNOSIS — M25.562 ACUTE PAIN OF LEFT KNEE: Primary | ICD-10-CM

## 2023-11-02 DIAGNOSIS — R29.898 LOWER EXTREMITY WEAKNESS: ICD-10-CM

## 2023-11-02 DIAGNOSIS — M62.559 ATROPHY OF QUADRICEPS FEMORIS MUSCLE: ICD-10-CM

## 2023-11-02 DIAGNOSIS — R26.2 DIFFICULTY WALKING: ICD-10-CM

## 2023-11-02 DIAGNOSIS — M25.469 KNEE SWELLING: ICD-10-CM

## 2023-11-02 DIAGNOSIS — M25.662 DECREASED RANGE OF MOTION OF LEFT KNEE: ICD-10-CM

## 2023-11-02 PROCEDURE — 97110 THERAPEUTIC EXERCISES: CPT | Mod: GP

## 2023-11-02 PROCEDURE — 97016 VASOPNEUMATIC DEVICE THERAPY: CPT | Mod: GP

## 2023-11-02 PROCEDURE — 97112 NEUROMUSCULAR REEDUCATION: CPT | Mod: GP

## 2023-11-02 PROCEDURE — 97140 MANUAL THERAPY 1/> REGIONS: CPT | Mod: GP

## 2023-11-02 ASSESSMENT — PAIN SCALES - GENERAL: PAINLEVEL_OUTOF10: 2

## 2023-11-02 ASSESSMENT — PAIN - FUNCTIONAL ASSESSMENT: PAIN_FUNCTIONAL_ASSESSMENT: 0-10

## 2023-11-02 NOTE — PROGRESS NOTES
Physical Therapy  Physical Therapy Treatment Note    Patient Name: Diego Prasad  MRN: [id number]  Today's Date: 10/30/2023       Insurance:  Visit number: 6 of 22 (25 visits/year, has used 3 previously)  Authorization info: no auth  Insurance Type: C choice+ vaso ok    General:  Reason for visit: L knee injury  Referred by: Dr. Marquez    Current Problem  1. Acute pain of left knee        2. Atrophy of quadriceps femoris muscle        3. Lower extremity weakness        4. Decreased range of motion of left knee        5. Difficulty walking        6. Balance problems        7. Knee swelling                    Precautions: n/a      Subjective:   Notes soreness in hip, calf is feeling better and his flexion motion is improving. Thinks most of his discomfort was from the brace because he started sleeping without it and noticed improvement in discomfort and range of motion after that.  Gets discomfort from brace straps, especially during prolonged sitting.    Pain  Pain Assessment: 0-10  Pain Score: 2    Performing HEP?: Yes      Objective:                           ROM     Knee AROM (Degrees)                             (R)                    (L)  Flexion:            137                   55                                           Extension:        7                        0                                    Knee PROM (Degrees)                             (R)                    (L)  Flexion:                                                                                      Extension:                                 +6 (able to activate in heel prop)                                        Effusion: trace                             Strength Testing     Formal strength testing deferred due to acute injury status                                Functional Screening  Functional movements deferred due to acute injury     Palpation: patellar tendon, medial aspect of knee LLE        Patella Mobility: restricted  "multidirectional glide L        Orthotics: hinged knee brace, minimal extension allowed        Gait: enters NWB with carmita axillary crutches                                   Special Tests  Ligament Tests:              Lachman Test: + L, - R              Valgus Stress Test: + L (laxity), - R              Varus Stress Test: - L/R              Valgus/varus stress test at 0deg ext: - L/R  Meniscus              Stewart Test: - R, unable to test L      Treatment Performed:    Therapeutic Exercise:    37 min  Short sitting flexion HS curl 2x10  Heel slides x20  Shuttle 60% BW DL x20  Ambulation in clinic with cues for heel strike, weightbearing on injured limb  NP:  Knee extension prop 3lbs 10\" on/off quad sets  PB knee flexion roll in x20  SAQ 3x10  Medial lateral weight shift x20  SLR x30 breaks PRN    Manual Therapy:    10 min  STM distal hamstrings, popliteus  Patellar mobs inferior/superior    Neuromuscular Re-education:  30 min  Mtrigger biofeedback 10/10 on/off  Quad sets in heel prop goal 400 x5 min  Quad sets over towel roll goal 400 x5 min   NMES [place] amplitude to tolerance 10 on/20 off  Quad sets over towel roll x15  Assisted LAQ x15    Other: gait training; vaso    0; 20min  (0 - NP) Gait training:  Alter G 40-60% BW visual/data cues for heel strike, knee flexion (includes time for set up)  Alter G 50% WB SL balance 4x15\" UE support as needed (NP)  (20) Vaso cold temp low compression x20 minutes (includes set up)      Assessment:   Pt demonstrated significant progress in knee range of motion this session, with improved flexion at start of session and full passive extension following interventions. Pt demonstrated noticeable quad lag during open chain strengthening activities including NMES training. Pt cued to utilize contralateral leg for assistance with intermittent improvement seen. Pt still having difficulty and apprehension with weightbearing on injured leg, requiring cueing to heel strike while " ambulating in clinic with crutches.      Plan:  Quad strength training - biofeedback, NMES, continue open chain, add SLR if able  Gait training, weightbearing activities (shuttle, weight shifting, knee flexion on step, etc.)      Enriqueta Almonte, PT

## 2023-11-03 PROBLEM — S83.282A ACUTE LATERAL MENISCUS TEAR OF LEFT KNEE: Status: ACTIVE | Noted: 2023-11-02

## 2023-11-03 PROBLEM — S83.512A LEFT ANTERIOR CRUCIATE LIGAMENT TEAR: Status: ACTIVE | Noted: 2023-11-02

## 2023-11-03 RX ORDER — SODIUM CHLORIDE 9 MG/ML
100 INJECTION, SOLUTION INTRAVENOUS CONTINUOUS
Status: CANCELLED | OUTPATIENT
Start: 2023-11-17

## 2023-11-06 ENCOUNTER — TREATMENT (OUTPATIENT)
Dept: PHYSICAL THERAPY | Facility: HOSPITAL | Age: 23
End: 2023-11-06
Payer: COMMERCIAL

## 2023-11-06 ENCOUNTER — OFFICE VISIT (OUTPATIENT)
Dept: ORTHOPEDIC SURGERY | Facility: HOSPITAL | Age: 23
End: 2023-11-06
Payer: COMMERCIAL

## 2023-11-06 VITALS — HEIGHT: 72 IN | BODY MASS INDEX: 23.03 KG/M2 | WEIGHT: 170 LBS

## 2023-11-06 DIAGNOSIS — M25.662 DECREASED RANGE OF MOTION OF LEFT KNEE: ICD-10-CM

## 2023-11-06 DIAGNOSIS — R26.89 BALANCE PROBLEMS: ICD-10-CM

## 2023-11-06 DIAGNOSIS — R26.2 DIFFICULTY WALKING: ICD-10-CM

## 2023-11-06 DIAGNOSIS — M25.562 ACUTE PAIN OF LEFT KNEE: Primary | ICD-10-CM

## 2023-11-06 DIAGNOSIS — M62.559 ATROPHY OF QUADRICEPS FEMORIS MUSCLE: ICD-10-CM

## 2023-11-06 DIAGNOSIS — S83.282A ACUTE LATERAL MENISCUS TEAR OF LEFT KNEE, INITIAL ENCOUNTER: Primary | ICD-10-CM

## 2023-11-06 DIAGNOSIS — M25.469 KNEE SWELLING: ICD-10-CM

## 2023-11-06 DIAGNOSIS — R29.898 LOWER EXTREMITY WEAKNESS: ICD-10-CM

## 2023-11-06 DIAGNOSIS — S83.512A RUPTURE OF ANTERIOR CRUCIATE LIGAMENT OF LEFT KNEE, INITIAL ENCOUNTER: ICD-10-CM

## 2023-11-06 PROCEDURE — 97110 THERAPEUTIC EXERCISES: CPT | Mod: GP

## 2023-11-06 PROCEDURE — 97016 VASOPNEUMATIC DEVICE THERAPY: CPT | Mod: GP

## 2023-11-06 PROCEDURE — 99213 OFFICE O/P EST LOW 20 MIN: CPT | Performed by: ORTHOPAEDIC SURGERY

## 2023-11-06 PROCEDURE — L1833 KO ADJ JNT POS R SUP PRE OTS: HCPCS | Performed by: ORTHOPAEDIC SURGERY

## 2023-11-06 PROCEDURE — 97112 NEUROMUSCULAR REEDUCATION: CPT | Mod: GP

## 2023-11-06 PROCEDURE — 97140 MANUAL THERAPY 1/> REGIONS: CPT | Mod: GP

## 2023-11-06 PROCEDURE — 1036F TOBACCO NON-USER: CPT | Performed by: ORTHOPAEDIC SURGERY

## 2023-11-06 ASSESSMENT — PAIN - FUNCTIONAL ASSESSMENT: PAIN_FUNCTIONAL_ASSESSMENT: 0-10

## 2023-11-06 ASSESSMENT — PAIN SCALES - GENERAL: PAINLEVEL_OUTOF10: 2

## 2023-11-06 NOTE — PROGRESS NOTES
This is a pleasant 23 y.o. year old male who presents for fuv of  left knee.    He is doing PT, working on motion.  Knee is feeling better, less pain.    PHYSICAL EXAMINATION  Constitutional Exam: patient's height and weight reviewed, well-kempt  Psychiatric Exam: alert and oriented x 3, appropriate mood and behavior  Eye Exam: CODYPAUL TRIVEDI  Pulmonary Exam: breathing non-labored, no apparent distress  Lymphatic exam: no appreciable lymphadenopathy in the lower extremities  Cardiovascular exam: DP pulses 2+ bilaterally, PT 2+ bilaterally, toes are pink with good capillary refill, no pitting edema  Skin exam: no open lesions, rashes, abrasions or ulcerations  Neurological exam: sensation to light touch intact in both lower extremities in peripheral and dermatomal distributions (except for any abnormalities noted in musculoskeletal exam)    Musculoskeletal exam: left knee: no effusion, motion much improved full extension to just past 90 degrees of flexion, quad tone better    ASSESSMENT: left ACL rupture, radial tear lateral meniscus  PLAN: Further treatment options discussed including reviewed surgical procedure with BPB autograft and lateral meniscal repair (if tissue not healthy or repairable then partial menisectomy will have to be done).  Reviewed post-op course and rehab protocol.  His knee will be ready for surgery in a week or so, fulfilled criteria.  Follow-up in surgery.  The patient's questions were answered in detail.      Patient was prescribed a hinged knee post-op brace for left knee ACL problem.  The patient is ambulatory with or without aid; but, has weakness, instability and/or deformity of their left lower extremity which requires stabilization from this orthosis to improve their function.      Verbal and written instructions for the use, wear schedule, cleaning and application of this item were given.  Patient was instructed that should the brace result in increased pain, decreased sensation,  increased swelling, or an overall worsening of their medical condition, to please contact our office immediately.     Orthotic management and training was provided for skin care, modifications due to healing tissues, edema changes, interruption in skin integrity, and safety precautions with the orthosis.      Note dictated with [employer] software, completed without full type editing to avoid delay.

## 2023-11-06 NOTE — PROGRESS NOTES
Physical Therapy  Physical Therapy Treatment Note    Patient Name: Diego Prasad  MRN: [id number]  Today's Date: 11/06/2023       Insurance:  Visit number: 8 of 22 (25 visits/year, has used 3 previously)  Authorization info: no auth  Insurance Type: [facility] choice+ vaso ok    General:  Reason for visit: L knee injury  Referred by: Dr. Marquez    Current Problem  1. Acute pain of left knee        2. Atrophy of quadriceps femoris muscle        3. Lower extremity weakness        4. Decreased range of motion of left knee        5. Difficulty walking        6. Balance problems        7. Knee swelling                        Precautions: n/a      Subjective:   No significant changes since last visit, has been able to sleep and move around in brace with less discomfort compared to old brace. Knee is pretty stiff today, just got up and hasn't moved it around much yet.    Pain  Pain Assessment: 0-10  Pain Score: 3    Performing HEP?: Yes      Objective:                           ROM     Knee AROM (Degrees)                             (R)                    (L)  Flexion:            137                   65 (improved to 75)                                      Extension:        7                        0                                    Knee PROM (Degrees)                             (R)                    (L)  Flexion:                                                                                      Extension:                                 +8 (able to activate in heel prop)                                        Effusion: 0                             Strength Testing     Formal strength testing deferred due to acute injury status                                Functional Screening  Functional movements deferred due to acute injury     Palpation: patellar tendon, medial aspect of knee LLE        Patella Mobility: minimally restricted multidirectional glide L (resolves post manual intervention)        Orthotics:  "hinged knee brace, minimal extension allowed        Gait: carmita axillary crutches                                   Special Tests  Ligament Tests:              Lachman Test: + L, - R              Valgus Stress Test: + L (laxity), - R              Varus Stress Test: - L/R              Valgus/varus stress test at 0deg ext: - L/R  Meniscus              Stewart Test: - R, unable to test L      Treatment Performed:    Therapeutic Exercise:    30 min  Recumbent bike x5 min rocking for knee flexion mobility  Short sitting knee flexion HS curl x15  Heel slides x10  Ambulation in clinic with cues for heel strike, weightbearing on injured limb  NP:  Knee extension prop 3lbs 10\" on/off quad sets  PB knee flexion roll in x20  SAQ 3x10    Manual Therapy:    8 min  STM patellar tendon, popliteus  Patellar mobs inferior/superior    Neuromuscular Re-education:  20 min  NMES Portuguese amplitude to tolerance 10 on/20 off   SLR x15 reps  LAQ 5lbs assisted at end range x15 reps  Mtrigger biofeedback 10/10 on/off  Quad sets in heel prop goal 600 x6 min  Quad sets over towel roll goal 400 x5 min (NP)  NP:  Medial lateral weight shift x20 with UE assist  DL squat with UE assist x20  Anterior/posterior heel/toe raise weight shift x20 with UE assist    Other: gait training; vaso    0; 15min  (0 - NP) Gait training:  Alter G 40-60% BW visual/data cues for heel strike, knee flexion (includes time for set up)  Alter G 50% WB SL balance 4x15\" UE support as needed (NP)  (15) Vaso cold temp low compression x10 minutes (includes set up)      Assessment:   Pt demonstrated full hyperextension following manual work, but continues to demonstrate observable quad lag during active knee extension. Improved with performance of LAQ and assistance of contralateral leg in terminal extension. Improved but still inconsistent activation in full hyperextension with ability to intermittently achieve higher goal on mtrigger.      Plan:  Give handout for post op " exercises (QS, AP, GS)   Prioritize quad strength training - biofeedback, NMES (consider isometric into belt)  Consider TKE, weighted leg extension partial range, leg press        Enriqueta Almonte, PT

## 2023-11-06 NOTE — PROGRESS NOTES
Physical Therapy  Physical Therapy Treatment Note    Patient Name: Diego Prasad  MRN: [id number]  Today's Date: 11/06/2023  Time Calculation  Start Time: 1000  Stop Time: 1124  Time Calculation (min): 84 min    Insurance:  Visit number: 7 of 22 (25 visits/year, has used 3 previously)  Authorization info: no auth  Insurance Type: [facility] choice+ vaso ok    General:  Reason for visit: L knee injury  Referred by: Dr. Marquez    Current Problem  1. Acute pain of left knee        2. Atrophy of quadriceps femoris muscle        3. Lower extremity weakness        4. Decreased range of motion of left knee        5. Difficulty walking        6. Balance problems        7. Knee swelling                      Precautions: n/a      Subjective:   Had follow up appt this morning with Dr. Marquez, was given new hinged brace that he reports feels more secure. Plan is for him to wear brace locked in extension for sleeping to maintain extension mobility. Having less soreness and muscle tightness throughout the injured leg.    Pain  Pain Assessment: 0-10  Pain Score: 2 (not rated this session due to focus on functional goals)    Performing HEP?: Yes      Objective:                           ROM     Knee AROM (Degrees)                             (R)                    (L)  Flexion:            137                   75 (improvement to 87 by end of session)                                           Extension:        7                        0                                    Knee PROM (Degrees)                             (R)                    (L)  Flexion:                                                                                      Extension:                                 +8 (able to activate in heel prop)                                        Effusion: 0                             Strength Testing     Formal strength testing deferred due to acute injury status                                Functional  "Screening  Functional movements deferred due to acute injury     Palpation: patellar tendon, medial aspect of knee LLE        Patella Mobility: minimally restricted multidirectional glide L (resolves post manual intervention)        Orthotics: hinged knee brace, minimal extension allowed        Gait: carmita axillary crutches                                   Special Tests  Ligament Tests:              Lachman Test: + L, - R              Valgus Stress Test: + L (laxity), - R              Varus Stress Test: - L/R              Valgus/varus stress test at 0deg ext: - L/R  Meniscus              Stewart Test: - R, unable to test L      Treatment Performed:    Therapeutic Exercise:    22 min  Recumbent bike x5 min rocking for knee flexion mobility  Heel slides x20  Assisted (strap) SLR 2x10-15  Ambulation in clinic with cues for heel strike, weightbearing on injured limb  Brace donning/doffing assistance and education regarding locking vs unlocking for gait, PT exercises and locking for maintaining extension  NP:  Knee extension prop 3lbs 10\" on/off quad sets  PB knee flexion roll in x20  SAQ 3x10    Manual Therapy:    12 min  STM patellar tendon, popliteus  Patellar mobs inferior/superior    Neuromuscular Re-education:  35 min  Mtrigger biofeedback 10/10 on/off  Quad sets in heel prop goal 400 x5 min  Quad sets over towel roll goal 400 x5 min   Medial lateral weight shift x20 with UE assist  DL squat with UE assist x20  Anterior/posterior heel/toe raise weight shift x20 with UE assist    NMES Togolese amplitude to tolerance 10 on/20 off (NP)  Quad sets over towel roll x15  Assisted LAQ x15    Other: gait training; vaso    0; 10min  (0 - NP) Gait training:  Alter G 40-60% BW visual/data cues for heel strike, knee flexion (includes time for set up)  Alter G 50% WB SL balance 4x15\" UE support as needed (NP)  (20) Vaso cold temp low compression x10 minutes (includes set up)      Assessment:   Pt demonstrates significant " improvement in weight acceptance on injured LE, able to squat to 90 deg with UE support by end of session (minimal shift away from injured side noted). Able to repeatedly accept at least 50% bodyweight by observation with decreased apprehension. Improving soft tissue tightness and guarding noted as well, with improving range of motion into flexion and extension. Patient would benefit from increased focus on quad strength and activation to help maintain extension range of motion.      Plan:  Prioritize quad strength training - biofeedback, NMES (SLR), consider weighted LAQ, TKE        Enriqueta Almonte, PT

## 2023-11-09 ENCOUNTER — TREATMENT (OUTPATIENT)
Dept: PHYSICAL THERAPY | Facility: HOSPITAL | Age: 23
End: 2023-11-09
Payer: COMMERCIAL

## 2023-11-09 DIAGNOSIS — M62.559 ATROPHY OF QUADRICEPS FEMORIS MUSCLE: ICD-10-CM

## 2023-11-09 DIAGNOSIS — M25.469 KNEE SWELLING: ICD-10-CM

## 2023-11-09 DIAGNOSIS — M25.562 ACUTE PAIN OF LEFT KNEE: Primary | ICD-10-CM

## 2023-11-09 DIAGNOSIS — M25.662 DECREASED RANGE OF MOTION OF LEFT KNEE: ICD-10-CM

## 2023-11-09 DIAGNOSIS — R29.898 LOWER EXTREMITY WEAKNESS: ICD-10-CM

## 2023-11-09 DIAGNOSIS — R26.89 BALANCE PROBLEMS: ICD-10-CM

## 2023-11-09 DIAGNOSIS — R26.2 DIFFICULTY WALKING: ICD-10-CM

## 2023-11-09 PROCEDURE — 97140 MANUAL THERAPY 1/> REGIONS: CPT | Mod: GP

## 2023-11-09 PROCEDURE — 97112 NEUROMUSCULAR REEDUCATION: CPT | Mod: GP

## 2023-11-09 PROCEDURE — 97110 THERAPEUTIC EXERCISES: CPT | Mod: GP

## 2023-11-09 PROCEDURE — 97016 VASOPNEUMATIC DEVICE THERAPY: CPT | Mod: GP

## 2023-11-09 ASSESSMENT — PAIN - FUNCTIONAL ASSESSMENT: PAIN_FUNCTIONAL_ASSESSMENT: 0-10

## 2023-11-09 ASSESSMENT — PAIN SCALES - GENERAL: PAINLEVEL_OUTOF10: 3

## 2023-11-13 ENCOUNTER — TREATMENT (OUTPATIENT)
Dept: PHYSICAL THERAPY | Facility: HOSPITAL | Age: 23
End: 2023-11-13
Payer: COMMERCIAL

## 2023-11-13 DIAGNOSIS — M62.559 ATROPHY OF QUADRICEPS FEMORIS MUSCLE: ICD-10-CM

## 2023-11-13 DIAGNOSIS — M25.469 KNEE SWELLING: ICD-10-CM

## 2023-11-13 DIAGNOSIS — R26.2 DIFFICULTY WALKING: ICD-10-CM

## 2023-11-13 DIAGNOSIS — M25.562 ACUTE PAIN OF LEFT KNEE: Primary | ICD-10-CM

## 2023-11-13 DIAGNOSIS — R26.89 BALANCE PROBLEMS: ICD-10-CM

## 2023-11-13 DIAGNOSIS — M25.662 DECREASED RANGE OF MOTION OF LEFT KNEE: ICD-10-CM

## 2023-11-13 DIAGNOSIS — R29.898 LOWER EXTREMITY WEAKNESS: ICD-10-CM

## 2023-11-13 PROCEDURE — 97110 THERAPEUTIC EXERCISES: CPT | Mod: GP

## 2023-11-13 PROCEDURE — 97016 VASOPNEUMATIC DEVICE THERAPY: CPT | Mod: GP

## 2023-11-13 PROCEDURE — 97112 NEUROMUSCULAR REEDUCATION: CPT | Mod: GP

## 2023-11-13 ASSESSMENT — PAIN - FUNCTIONAL ASSESSMENT: PAIN_FUNCTIONAL_ASSESSMENT: 0-10

## 2023-11-13 ASSESSMENT — PAIN SCALES - GENERAL: PAINLEVEL_OUTOF10: 4

## 2023-11-13 NOTE — PROGRESS NOTES
Physical Therapy  Physical Therapy Treatment Note    Patient Name: Diego Prasad  MRN: [id number]  Today's Date: 11/06/2023       Insurance:  Visit number: 9 of 22 (25 visits/year, has used 3 previously)  Authorization info: no auth  Insurance Type: [facility] choice+ vaso ok    General:  Reason for visit: L knee injury  Referred by: Dr. Marquez    Current Problem  1. Acute pain of left knee        2. Atrophy of quadriceps femoris muscle        3. Lower extremity weakness        4. Decreased range of motion of left knee        5. Difficulty walking        6. Balance problems        7. Knee swelling                          Precautions: n/a      Subjective:   Knee feels stiff today, thinks that the medial side of the brace is giving him some discomfort on the inside of the knee. Will be having his ACL surgery on Friday with first post op PT visit Monday 11/20.    Pain  Pain Assessment: 0-10  Pain Score: 4    Performing HEP?: Yes      Objective:                           ROM     Knee AROM (Degrees)                             (R)                    (L)  Flexion:            137                   74*                                    Extension:        7                        0                                    Knee PROM (Degrees)                             (R)                    (L)  Flexion:                                                                                      Extension:                                 +8 (able to activate in heel prop)                                        Effusion: 0                             Strength Testing     Formal strength testing deferred due to acute injury status                                Functional Screening  Functional movements deferred due to acute injury     Palpation: patellar tendon, medial aspect of knee LLE        Patella Mobility: minimally restricted multidirectional glide L (resolves post manual intervention)        Orthotics: hinged knee brace,  "minimal extension allowed        Gait: carmita axillary crutches                                   Special Tests  Ligament Tests:              Lachman Test: + L, - R              Valgus Stress Test: + L (laxity), - R              Varus Stress Test: - L/R              Valgus/varus stress test at 0deg ext: - L/R  Meniscus              Stewart Test: - R, unable to test L      Treatment Performed:    Therapeutic Exercise:    33 min  Recumbent bike x5 min rocking for knee flexion mobility  Heel slides x10  Ambulation in clinic with cues for heel strike, weightbearing on injured limb  Quad set over towel roll strap assist 10\" hold x15  SLR 4x5  TKE red band 4x5  Handout with post op exercises (QS, AP, glute set) provided, pt education provided re: typical post op course, WB restrictions, PT priorities  NP:  Knee extension prop 3lbs 10\" on/off quad sets  PB knee flexion roll in x20  SAQ 3x10    Manual Therapy:    5 min  Short sitting flexion overpressure/active hamstring curl, adding inferior patellar glide    Neuromuscular Re-education:  23 min  NMES Mexican amplitude to tolerance   Quad set in heel prop 10/10 x20 reps  Resisted knee extension into belt 10/10 x20 reps  Includes time for set up  NP:  Mtrigger biofeedback 10/10 on/off  Quad sets in heel prop goal 600 x6 min  Quad sets over towel roll goal 400 x5 min (NP)  Medial lateral weight shift x20 with UE assist  DL squat with UE assist x20  Anterior/posterior heel/toe raise weight shift x20 with UE assist    Other: gait training; vaso    0; 15min  (0 - NP) Gait training:  Alter G 40-60% BW visual/data cues for heel strike, knee flexion (includes time for set up)  Alter G 50% WB SL balance 4x15\" UE support as needed (NP)  (15) Vaso cold temp low compression x10 minutes (includes set up)      Assessment:   Patient has made progress in knee extension range of motion and quad activation, with improvements in quad activation noted throughout today's session. He has also made " minimal progress in knee flexion and gait mechanics; flexion is still limited to <80 degrees secondary to pain, guarding, and tightness. He has been able to consistently put light pressure through the injured leg but has difficulty with full weight acceptance. The patient will benefit from continued skilled therapy following ACL surgery to regain functional mobility, ROM, strength, and full participation in activities at his prior level of function.      Plan:  Re-eval, first post op visit  Knee extension mobility and quad activation, heel slides  Dressing change, pt education as indicated        Enriqueta Almonte, PT

## 2023-11-17 ENCOUNTER — ANESTHESIA (OUTPATIENT)
Dept: OPERATING ROOM | Facility: CLINIC | Age: 23
End: 2023-11-17
Payer: COMMERCIAL

## 2023-11-17 ENCOUNTER — ANESTHESIA EVENT (OUTPATIENT)
Dept: OPERATING ROOM | Facility: CLINIC | Age: 23
End: 2023-11-17
Payer: COMMERCIAL

## 2023-11-17 ENCOUNTER — HOSPITAL ENCOUNTER (OUTPATIENT)
Facility: CLINIC | Age: 23
Setting detail: OUTPATIENT SURGERY
Discharge: HOME | End: 2023-11-17
Attending: ORTHOPAEDIC SURGERY | Admitting: ORTHOPAEDIC SURGERY
Payer: COMMERCIAL

## 2023-11-17 VITALS
DIASTOLIC BLOOD PRESSURE: 70 MMHG | HEART RATE: 70 BPM | OXYGEN SATURATION: 100 % | HEIGHT: 72 IN | RESPIRATION RATE: 16 BRPM | TEMPERATURE: 97.2 F | SYSTOLIC BLOOD PRESSURE: 143 MMHG | BODY MASS INDEX: 22.9 KG/M2 | WEIGHT: 169.09 LBS

## 2023-11-17 DIAGNOSIS — S83.512A RUPTURE OF ANTERIOR CRUCIATE LIGAMENT OF LEFT KNEE, INITIAL ENCOUNTER: Primary | ICD-10-CM

## 2023-11-17 DIAGNOSIS — S83.282A ACUTE LATERAL MENISCUS TEAR OF LEFT KNEE, INITIAL ENCOUNTER: ICD-10-CM

## 2023-11-17 PROCEDURE — 3600000004 HC OR TIME - INITIAL BASE CHARGE - PROCEDURE LEVEL FOUR: Performed by: ORTHOPAEDIC SURGERY

## 2023-11-17 PROCEDURE — 29881 ARTHRS KNE SRG MNISECTMY M/L: CPT | Performed by: ORTHOPAEDIC SURGERY

## 2023-11-17 PROCEDURE — 7100000009 HC PHASE TWO TIME - INITIAL BASE CHARGE: Performed by: ORTHOPAEDIC SURGERY

## 2023-11-17 PROCEDURE — A29888 PR KNEE SCOPE,AID ANT CRUCIATE REPAIR: Performed by: STUDENT IN AN ORGANIZED HEALTH CARE EDUCATION/TRAINING PROGRAM

## 2023-11-17 PROCEDURE — 3700000002 HC GENERAL ANESTHESIA TIME - EACH INCREMENTAL 1 MINUTE: Performed by: ORTHOPAEDIC SURGERY

## 2023-11-17 PROCEDURE — 7100000002 HC RECOVERY ROOM TIME - EACH INCREMENTAL 1 MINUTE: Performed by: ORTHOPAEDIC SURGERY

## 2023-11-17 PROCEDURE — 94760 N-INVAS EAR/PLS OXIMETRY 1: CPT | Mod: 59

## 2023-11-17 PROCEDURE — 2720000007 HC OR 272 NO HCPCS: Performed by: ORTHOPAEDIC SURGERY

## 2023-11-17 PROCEDURE — A4217 STERILE WATER/SALINE, 500 ML: HCPCS | Performed by: ORTHOPAEDIC SURGERY

## 2023-11-17 PROCEDURE — 3600000009 HC OR TIME - EACH INCREMENTAL 1 MINUTE - PROCEDURE LEVEL FOUR: Performed by: ORTHOPAEDIC SURGERY

## 2023-11-17 PROCEDURE — 2780000003 HC OR 278 NO HCPCS: Performed by: ORTHOPAEDIC SURGERY

## 2023-11-17 PROCEDURE — 2500000004 HC RX 250 GENERAL PHARMACY W/ HCPCS (ALT 636 FOR OP/ED): Performed by: ORTHOPAEDIC SURGERY

## 2023-11-17 PROCEDURE — C1713 ANCHOR/SCREW BN/BN,TIS/BN: HCPCS | Performed by: ORTHOPAEDIC SURGERY

## 2023-11-17 PROCEDURE — 29888 ARTHRS AID ACL RPR/AGMNTJ: CPT | Performed by: ORTHOPAEDIC SURGERY

## 2023-11-17 PROCEDURE — 3700000001 HC GENERAL ANESTHESIA TIME - INITIAL BASE CHARGE: Performed by: ORTHOPAEDIC SURGERY

## 2023-11-17 PROCEDURE — A29888 PR KNEE SCOPE,AID ANT CRUCIATE REPAIR: Performed by: ANESTHESIOLOGIST ASSISTANT

## 2023-11-17 PROCEDURE — 2500000001 HC RX 250 WO HCPCS SELF ADMINISTERED DRUGS (ALT 637 FOR MEDICARE OP): Performed by: STUDENT IN AN ORGANIZED HEALTH CARE EDUCATION/TRAINING PROGRAM

## 2023-11-17 PROCEDURE — 7100000010 HC PHASE TWO TIME - EACH INCREMENTAL 1 MINUTE: Performed by: ORTHOPAEDIC SURGERY

## 2023-11-17 PROCEDURE — 64447 NJX AA&/STRD FEMORAL NRV IMG: CPT | Performed by: STUDENT IN AN ORGANIZED HEALTH CARE EDUCATION/TRAINING PROGRAM

## 2023-11-17 PROCEDURE — 2500000004 HC RX 250 GENERAL PHARMACY W/ HCPCS (ALT 636 FOR OP/ED): Performed by: ANESTHESIOLOGIST ASSISTANT

## 2023-11-17 PROCEDURE — 7100000001 HC RECOVERY ROOM TIME - INITIAL BASE CHARGE: Performed by: ORTHOPAEDIC SURGERY

## 2023-11-17 DEVICE — BIOSURE REGENSORB INTERFERENCE                                    SCREW 7 MM X 20MM
Type: IMPLANTABLE DEVICE | Site: KNEE | Status: FUNCTIONAL
Brand: BIOSURE

## 2023-11-17 DEVICE — BIOSURE REGENSORB INTERFERENCE                                    SCREW 7 MM X 25MM
Type: IMPLANTABLE DEVICE | Site: KNEE | Status: FUNCTIONAL
Brand: BIOSURE

## 2023-11-17 RX ORDER — HYDROMORPHONE HYDROCHLORIDE 1 MG/ML
0.5 INJECTION, SOLUTION INTRAMUSCULAR; INTRAVENOUS; SUBCUTANEOUS EVERY 5 MIN PRN
Status: DISCONTINUED | OUTPATIENT
Start: 2023-11-17 | End: 2023-11-17 | Stop reason: HOSPADM

## 2023-11-17 RX ORDER — ONDANSETRON HYDROCHLORIDE 2 MG/ML
INJECTION, SOLUTION INTRAVENOUS AS NEEDED
Status: DISCONTINUED | OUTPATIENT
Start: 2023-11-17 | End: 2023-11-17

## 2023-11-17 RX ORDER — MIDAZOLAM HYDROCHLORIDE 1 MG/ML
INJECTION, SOLUTION INTRAMUSCULAR; INTRAVENOUS AS NEEDED
Status: DISCONTINUED | OUTPATIENT
Start: 2023-11-17 | End: 2023-11-17

## 2023-11-17 RX ORDER — ONDANSETRON 4 MG/1
4 TABLET, FILM COATED ORAL DAILY
Qty: 6 TABLET | Refills: 0 | Status: ON HOLD | OUTPATIENT
Start: 2023-11-17 | End: 2024-01-10 | Stop reason: ALTCHOICE

## 2023-11-17 RX ORDER — ALBUTEROL SULFATE 0.83 MG/ML
2.5 SOLUTION RESPIRATORY (INHALATION) ONCE AS NEEDED
Status: DISCONTINUED | OUTPATIENT
Start: 2023-11-17 | End: 2023-11-17 | Stop reason: HOSPADM

## 2023-11-17 RX ORDER — PROPOFOL 10 MG/ML
INJECTION, EMULSION INTRAVENOUS AS NEEDED
Status: DISCONTINUED | OUTPATIENT
Start: 2023-11-17 | End: 2023-11-17

## 2023-11-17 RX ORDER — DOCUSATE SODIUM 100 MG/1
100 CAPSULE, LIQUID FILLED ORAL 2 TIMES DAILY
Qty: 12 CAPSULE | Refills: 0 | Status: ON HOLD | OUTPATIENT
Start: 2023-11-17 | End: 2024-01-10 | Stop reason: ALTCHOICE

## 2023-11-17 RX ORDER — ONDANSETRON HYDROCHLORIDE 2 MG/ML
4 INJECTION, SOLUTION INTRAVENOUS ONCE AS NEEDED
Status: DISCONTINUED | OUTPATIENT
Start: 2023-11-17 | End: 2023-11-17 | Stop reason: HOSPADM

## 2023-11-17 RX ORDER — HYDROMORPHONE HYDROCHLORIDE 1 MG/ML
INJECTION, SOLUTION INTRAMUSCULAR; INTRAVENOUS; SUBCUTANEOUS AS NEEDED
Status: DISCONTINUED | OUTPATIENT
Start: 2023-11-17 | End: 2023-11-17

## 2023-11-17 RX ORDER — SODIUM CHLORIDE 9 MG/ML
100 INJECTION, SOLUTION INTRAVENOUS CONTINUOUS
Status: DISCONTINUED | OUTPATIENT
Start: 2023-11-17 | End: 2023-11-17 | Stop reason: HOSPADM

## 2023-11-17 RX ORDER — HYDRALAZINE HYDROCHLORIDE 20 MG/ML
5 INJECTION INTRAMUSCULAR; INTRAVENOUS EVERY 30 MIN PRN
Status: DISCONTINUED | OUTPATIENT
Start: 2023-11-17 | End: 2023-11-17 | Stop reason: HOSPADM

## 2023-11-17 RX ORDER — SODIUM CHLORIDE, SODIUM LACTATE, POTASSIUM CHLORIDE, CALCIUM CHLORIDE 600; 310; 30; 20 MG/100ML; MG/100ML; MG/100ML; MG/100ML
100 INJECTION, SOLUTION INTRAVENOUS CONTINUOUS
Status: DISCONTINUED | OUTPATIENT
Start: 2023-11-17 | End: 2023-11-17 | Stop reason: HOSPADM

## 2023-11-17 RX ORDER — OXYCODONE HYDROCHLORIDE 5 MG/1
5 TABLET ORAL EVERY 4 HOURS PRN
Status: DISCONTINUED | OUTPATIENT
Start: 2023-11-17 | End: 2023-11-17 | Stop reason: HOSPADM

## 2023-11-17 RX ORDER — LIDOCAINE HYDROCHLORIDE 10 MG/ML
0.1 INJECTION, SOLUTION EPIDURAL; INFILTRATION; INTRACAUDAL; PERINEURAL ONCE
Status: DISCONTINUED | OUTPATIENT
Start: 2023-11-17 | End: 2023-11-17 | Stop reason: HOSPADM

## 2023-11-17 RX ORDER — DEXAMETHASONE SODIUM PHOSPHATE 100 MG/10ML
INJECTION INTRAMUSCULAR; INTRAVENOUS AS NEEDED
Status: DISCONTINUED | OUTPATIENT
Start: 2023-11-17 | End: 2023-11-17

## 2023-11-17 RX ORDER — FENTANYL CITRATE 50 UG/ML
INJECTION, SOLUTION INTRAMUSCULAR; INTRAVENOUS AS NEEDED
Status: DISCONTINUED | OUTPATIENT
Start: 2023-11-17 | End: 2023-11-17

## 2023-11-17 RX ORDER — HYDROCODONE BITARTRATE AND ACETAMINOPHEN 5; 325 MG/1; MG/1
1 TABLET ORAL EVERY 6 HOURS PRN
Qty: 28 TABLET | Refills: 0 | Status: SHIPPED | OUTPATIENT
Start: 2023-11-17 | End: 2023-11-27 | Stop reason: WASHOUT

## 2023-11-17 RX ORDER — FENTANYL CITRATE 50 UG/ML
50 INJECTION, SOLUTION INTRAMUSCULAR; INTRAVENOUS EVERY 5 MIN PRN
Status: DISCONTINUED | OUTPATIENT
Start: 2023-11-17 | End: 2023-11-17 | Stop reason: HOSPADM

## 2023-11-17 RX ORDER — SODIUM CHLORIDE 0.9 G/100ML
IRRIGANT IRRIGATION AS NEEDED
Status: DISCONTINUED | OUTPATIENT
Start: 2023-11-17 | End: 2023-11-17 | Stop reason: HOSPADM

## 2023-11-17 RX ORDER — ASPIRIN 81 MG/1
81 TABLET ORAL DAILY
Qty: 14 TABLET | Refills: 0 | Status: ON HOLD | OUTPATIENT
Start: 2023-11-17 | End: 2024-01-10 | Stop reason: ALTCHOICE

## 2023-11-17 RX ORDER — CEFAZOLIN SODIUM 2 G/100ML
2 INJECTION, SOLUTION INTRAVENOUS ONCE
Status: DISCONTINUED | OUTPATIENT
Start: 2023-11-17 | End: 2023-11-17 | Stop reason: HOSPADM

## 2023-11-17 RX ORDER — CEFAZOLIN 1 G/1
INJECTION, POWDER, FOR SOLUTION INTRAVENOUS AS NEEDED
Status: DISCONTINUED | OUTPATIENT
Start: 2023-11-17 | End: 2023-11-17

## 2023-11-17 RX ORDER — SODIUM CHLORIDE, SODIUM LACTATE, POTASSIUM CHLORIDE, AND CALCIUM CHLORIDE .6; .31; .03; .02 G/100ML; G/100ML; G/100ML; G/100ML
IRRIGANT IRRIGATION AS NEEDED
Status: DISCONTINUED | OUTPATIENT
Start: 2023-11-17 | End: 2023-11-17 | Stop reason: HOSPADM

## 2023-11-17 RX ADMIN — PROPOFOL 30 MG: 10 INJECTION, EMULSION INTRAVENOUS at 08:36

## 2023-11-17 RX ADMIN — DEXAMETHASONE SODIUM PHOSPHATE 4 MG: 10 INJECTION INTRAMUSCULAR; INTRAVENOUS at 08:06

## 2023-11-17 RX ADMIN — HYDROMORPHONE HYDROCHLORIDE 0.2 MG: 1 INJECTION, SOLUTION INTRAMUSCULAR; INTRAVENOUS; SUBCUTANEOUS at 08:11

## 2023-11-17 RX ADMIN — OXYCODONE 5 MG: 5 TABLET ORAL at 11:55

## 2023-11-17 RX ADMIN — SODIUM CHLORIDE, SODIUM LACTATE, POTASSIUM CHLORIDE, AND CALCIUM CHLORIDE: .6; .31; .03; .02 INJECTION, SOLUTION INTRAVENOUS at 07:54

## 2023-11-17 RX ADMIN — FENTANYL CITRATE 25 MCG: 50 INJECTION, SOLUTION INTRAMUSCULAR; INTRAVENOUS at 07:55

## 2023-11-17 RX ADMIN — MIDAZOLAM 2 MG: 1 INJECTION INTRAMUSCULAR; INTRAVENOUS at 07:55

## 2023-11-17 RX ADMIN — PROPOFOL 50 MG: 10 INJECTION, EMULSION INTRAVENOUS at 08:17

## 2023-11-17 RX ADMIN — FENTANYL CITRATE 75 MCG: 50 INJECTION, SOLUTION INTRAMUSCULAR; INTRAVENOUS at 07:59

## 2023-11-17 RX ADMIN — PROPOFOL 30 MG: 10 INJECTION, EMULSION INTRAVENOUS at 08:55

## 2023-11-17 RX ADMIN — PROPOFOL 30 MG: 10 INJECTION, EMULSION INTRAVENOUS at 08:46

## 2023-11-17 RX ADMIN — PROPOFOL 50 MG: 10 INJECTION, EMULSION INTRAVENOUS at 10:49

## 2023-11-17 RX ADMIN — PROPOFOL 30 MG: 10 INJECTION, EMULSION INTRAVENOUS at 09:04

## 2023-11-17 RX ADMIN — HYDROMORPHONE HYDROCHLORIDE 0.5 MG: 1 INJECTION, SOLUTION INTRAMUSCULAR; INTRAVENOUS; SUBCUTANEOUS at 10:46

## 2023-11-17 RX ADMIN — HYDROMORPHONE HYDROCHLORIDE 0.6 MG: 1 INJECTION, SOLUTION INTRAMUSCULAR; INTRAVENOUS; SUBCUTANEOUS at 09:09

## 2023-11-17 RX ADMIN — PROPOFOL 50 MG: 10 INJECTION, EMULSION INTRAVENOUS at 08:21

## 2023-11-17 RX ADMIN — PROPOFOL 200 MG: 10 INJECTION, EMULSION INTRAVENOUS at 07:59

## 2023-11-17 RX ADMIN — CEFAZOLIN 2 G: 1 INJECTION, POWDER, FOR SOLUTION INTRAMUSCULAR; INTRAVENOUS at 08:07

## 2023-11-17 RX ADMIN — HYDROMORPHONE HYDROCHLORIDE 0.2 MG: 1 INJECTION, SOLUTION INTRAMUSCULAR; INTRAVENOUS; SUBCUTANEOUS at 08:21

## 2023-11-17 RX ADMIN — HYDROMORPHONE HYDROCHLORIDE 0.5 MG: 1 INJECTION, SOLUTION INTRAMUSCULAR; INTRAVENOUS; SUBCUTANEOUS at 10:41

## 2023-11-17 RX ADMIN — ONDANSETRON 4 MG: 2 INJECTION INTRAMUSCULAR; INTRAVENOUS at 10:46

## 2023-11-17 RX ADMIN — PROPOFOL 30 MG: 10 INJECTION, EMULSION INTRAVENOUS at 10:24

## 2023-11-17 ASSESSMENT — PAIN - FUNCTIONAL ASSESSMENT
PAIN_FUNCTIONAL_ASSESSMENT: 0-10

## 2023-11-17 ASSESSMENT — PAIN SCALES - GENERAL
PAINLEVEL_OUTOF10: 3
PAIN_LEVEL: 0
PAINLEVEL_OUTOF10: 0 - NO PAIN
PAINLEVEL_OUTOF10: 3
PAINLEVEL_OUTOF10: 0 - NO PAIN

## 2023-11-17 ASSESSMENT — PAIN DESCRIPTION - LOCATION: LOCATION: LEG

## 2023-11-17 ASSESSMENT — PAIN DESCRIPTION - ORIENTATION: ORIENTATION: LEFT

## 2023-11-17 NOTE — ANESTHESIA PROCEDURE NOTES
Peripheral Block    Patient location during procedure: pre-op  Start time: 11/17/2023 7:50 AM  End time: 11/17/2023 7:52 AM  Reason for block: at surgeon's request and post-op pain management  Staffing  Performed: attending   Authorized by: Brittney Watt MD    Performed by: Brittney Watt MD  Preanesthetic Checklist  Completed: patient identified, IV checked, risks and benefits discussed, monitors and equipment checked, pre-op evaluation and timeout performed   Timeout performed at: 11/17/2023 7:50 AM  Peripheral Block  Patient position: laying flat  Prep: ChloraPrep  Patient monitoring: heart rate, cardiac monitor and continuous pulse ox  Block type: adductor canal  Injection technique: single-shot  Guidance: ultrasound guided  Local infiltration: lidocaine  Infiltration strength: 1 %  Dose: 5 mL  Needle  Needle gauge: 21 G  Needle length: 8 cm  Needle localization: ultrasound guidance  Assessment  Injection assessment: negative aspiration for heme, no paresthesia on injection, incremental injection and local visualized surrounding nerve on ultrasound  Paresthesia pain: none  Heart rate change: no  Slow fractionated injection: yes  Additional Notes  Patient was placed in supine position and skin prep was applied and allowed to dry over the noted laterality above. A high frequency linear ultrasound probe was placed over the anterior/medial thigh with corresponding anatomical and vascular structures noted. 5 ml of 1% lidocaine was used for topicalization. An echogenic needle was placed with in-line visualization via ultrasound and 30 ml of 0.5% ropivacaine was injected in the adductor canal. All local anesthestic was administered in incremental doses with negative aspiration noted between. Vital signs remained stable and no paresthesias were noted throughout the procedure.

## 2023-11-17 NOTE — BRIEF OP NOTE
Date: 2023  OR Location: [facility] OR    Name: Diego Prasad, : 2000, Age: 23 y.o., MRN: [id number], Sex: male    Diagnosis  Pre-op Diagnosis     * Rupture of anterior cruciate ligament of left knee, initial encounter [S83.512A]     * Acute lateral meniscus tear of left knee, initial encounter [S83.282A] Post-op Diagnosis     * Rupture of anterior cruciate ligament of left knee, initial encounter [S83.512A]     * Acute lateral meniscus tear of left knee, initial encounter [S83.282A]     Procedures  Repair Arthroscopy Anterior Cruciate Ligament Knee with BPB autograft, partial lateral menisectomy  98215 - VA ARTHRS AIDED ANT CRUCIATE LIGM RPR/AGMNTJ/RCNSTJ    VA ARTHROSCOPY KNEE W/MENISCUS RPR MEDIAL/LATERAL [91134]  Surgeons      * Cammie Marquez - Primary    Resident/Fellow/Other Assistant:  Surgeon(s) and Role:     * Peyton Shrestha MD - Assisting    Procedure Summary  Anesthesia: General  ASA: ASA status not filed in the log.  Anesthesia Staff: Anesthesiologist: Brittney Watt MD  C-AA: SABAS Mak  Estimated Blood Loss: 20mL  Intra-op Medications:   Medication Name Total Dose   lactated Ringer's irrigation solution 18,000 mL   sodium chloride 0.9 % irrigation solution 1,000 mL              Anesthesia Record               Intraprocedure I/O Totals          Intake    Propofol Drip 0.00 mL    The total shown is the total volume documented since Anesthesia Start was filed.    Total Intake 0 mL          Specimen: No specimens collected     Staff:   Circulator: Brennan Robin RN  Scrub Person: Jing Summers          Findings: left ACL rupture, left lateral meniscus tear    Complications:  None; patient tolerated the procedure well.     Disposition: PACU - hemodynamically stable.  Condition: stable  Specimens Collected: No specimens collected  Attending Attestation: I was present and scrubbed for entirety of case    Cammie Marquez  Phone Number:  [phone]

## 2023-11-17 NOTE — OP NOTE
Repair Arthroscopy Anterior Cruciate Ligament Knee with BPB autograft, lateral meniscus repair, partial lateral menisectomy (L) Operative Note     Date: 2023  OR Location: [facility] OR    Name: Diego Prasad, : 2000, Age: 23 y.o., MRN: [id number], Sex: male    Diagnosis  Pre-op Diagnosis     * Rupture of anterior cruciate ligament of left knee, initial encounter [S83.512A]     * Acute lateral meniscus tear of left knee, initial encounter [S83.282A] Post-op Diagnosis     * Rupture of anterior cruciate ligament of left knee, initial encounter [S83.512A]     * Acute lateral meniscus tear of left knee, initial encounter [S83.282A]     Procedures  Left knee arthroscopic-assisted Anterior Cruciate Ligament reconstruction with BPB autograft, arthroscopic partial lateral menisectomy  03537 - ND ARTHRS AIDED ANT CRUCIATE LIGM RPR/AGMNTJ/RCNSTJ    ND ARTHROSCOPY KNEE W/MENISCUS partial lateral menisectomy [25993]  Surgeons      * Cammie Marquez - Primary    Resident/Fellow/Other Assistant:  Surgeon(s) and Role:     * Peyton Shrestha MD - Assisting    Procedure Summary  Anesthesia: General  ASA: I  Anesthesia Staff: Anesthesiologist: Brittney Watt MD  C-AA: SABAS Mak  Estimated Blood Loss: 10mL  Intra-op Medications:   Medication Name Total Dose   lactated Ringer's irrigation solution 18,000 mL   sodium chloride 0.9 % irrigation solution 1,000 mL              Anesthesia Record               Intraprocedure I/O Totals          Intake    Propofol Drip 0.00 mL    The total shown is the total volume documented since Anesthesia Start was filed.    Total Intake 0 mL          Specimen: No specimens collected     Staff:   Circulator: Brennan Robin RN  Scrub Person: Jing Collier; Janet Summers         Drains and/or Catheters: * None in log *    Tourniquet Times:     Total Tourniquet Time Documented:  Thigh (Left) - 146 minutes  Total: Thigh (Left) - 146 minutes      Implants:  Implants        Type Name Action Serial No.      Screw SCREW, BIOSURE REGENESORB, 7 X 20MM - [id number] Implanted      Screw SCREW, BIOSURE REGENESORB, 7 X 25MM - [id number] Implanted             Indications: Diego Prasad is an 23 y.o. male who is having surgery for Rupture of anterior cruciate ligament of left knee, initial encounter [S83.512A]  Acute lateral meniscus tear of left knee, initial encounter [S83.282A].  Knee instability and pain significantly limiting his function.  Imaging revealed ACL rupture and lateral meniscal tear.  After discussing the alternatives of treatment in detail with the patient, the patient selected surgical intervention and/or reconstruction.  We discussed with the patient risks and benefits of the procedure(s).  Risks of surgery were reviewed including pain, bleeding, infection, wound healing problems, soft tissue or bone healing problems, injury to nerves or vessels, implant or hardware related complications, chronic extremity stiffness or pain or swelling, post-traumatic arthritis, recurrent symptoms, DVT, PE and other medical complications.  After the patient´s questions were answered in detail including post-operative course requiring weightbearing as tolerated with knee brace locked straight and 2 crutches and the extensive rehabilitation needed after surgery, the patient then gave informed consent for the procedure.    DESCRIPTION OF PROCEDURE  The patient was identified in the pre-operative area and the surgical extremity was marked with a skin marker to designate surgical site.  Anesthesia consult placed femoral nerve block without complication.  Patient then was brought back to the operating room.  A huddle was called and confirmed upon entry into the operating room as per protocol.  General anesthetic was administered and LMA placed without complication.  Time out was called and confirmed prior to skin incision.  Patient received IV Ancef prior to skin incision as per protocol.   DVT prophylaxis was performed using stocking and SCD application.  A well-padded tourniquet was placed on the left upper thigh and was elevated to 280mmHg for 145 minutes during the case.  The patient then was carefully positioned supine with all superficial nerve areas, and bony prominences well-padded and protected during the case.  Thigh post and foam roll was utilized during the procedure for leg positioning.  We then proceeded to prep and drape in the usual standard sterile fashion.  Examination of the operative knee under anesthesia revealed positive Lachman, negative varus-valgus stress test.      Due to positive exam findings and ACL rupture noted on MRI scan, we proceeded with graft harvest.  Bone patella bone autograft was then harvested utilizing longitudinal incision based over the distal aspect of the patella toward the tibial tubercle.  Incision was made with a 15 blade.  We then used Bovie cautery for hemostasis.  We identified the peritenon and longitudinally split the peritenon and then elevated flaps medially and laterally to expose the patellar tendon.  We then bent the knee to 90 degrees and identified the central third of the patellar tendon and harvested it with a 15 blade.  We then utilized a [employer] retractor to bring the patella into the wound.  We then marked out our patellar bone plug using a ruler and knife.  We then proceeded with careful harvest of the patellar bone plug utilizing oscillating saw with nubbin.  We then proceeded distally and placed retractors to reveal the tibial tubercle.  We then marked out our bone plug on the tibial tubercle similar to the patellar bone plug being 10 mm x 25 mm.  We then utilized the oscillating saw without nubbin to create our bone cuts to create the tibial bone plug.  We then utilized a curved osteotome and delivered the tibial bone plug.  We then utilized a 15 blade to remove any adherent soft tissue on the undersurface of the graft.  We then  made a small start cut with the oscillating saw and patella.  We then carefully used the curved osteotome with little mallet use and were able to deliver the patellar bone plug without complication.  We then brought the graft to the back table for preparation.  We made a bullet shaped femoral bone plug out of the patellar harvest.  We then sized the plug to 9 mm.  We then placed 2 drill holes in the patellar bone plug.  We sized the tibial bone plug to 9 mm.  Extra cancellous bone was saved in a specimen cup for bone grafting the patellar harvest site at the end of the case.  The graft fit nicely through the 9 mm sizers.  Total graft length 110 mm.  We then proceeded to place the graft on the Graftmaster after we placed the ultra braid sutures,2 in each bone plug.  We then set the tension of the graft on the Graftmaster to 15 pounds.  We then placed a moist sterile wet lap sponge over the graft.  We then turned our attention to the arthroscopic portion of the case.    The knee was bent 90 degrees and anatomical landmarks located.  The anterolateral portal was localized with an 18 gauge spinal needle.  11-blade was used to create the portal.  The 4.0mm 30 degree arthroscope was then introduced into the knee.  Anteromedial portal was then localized with 18 gauge spinal needle with the knee bent in slight valgus.  Portal was created with 11-blade.  We then performed comprehensive evaluation of the knee starting in the suprapatellar pouch and found: patella apex intact, patella lateral facet intact, patella medial facet intact, ACL rupture, PCL intact, medial meniscus intact, lateral meniscus radial oblique tear involving the body and posterior horn and mainly in the white white zone, medial femoral condyle intact, medial tibial plateau intact, lateral femoral condyle grade I chondromalacia, lateral tibial plateau grade I chondromalacia. We then performed a partial lateral meniscectomy as the tissue was not healthy  enough and did not have good blood flow and so repair was not appropriate.  We utilized a series of oscillating jai and punches to create a stable rim and only remove the damaged tissue.  We switched portals to get to different portions of the meniscus.  After he completed the partial lateral meniscectomy the lateral meniscus was thoroughly probed and stable.  We then turned our attention to the arthroscopic ACL reconstruction.  We utilized the tibial guide set at 60 degrees and brought through the anterior medial portal.  We then once the guide was in place made a small incision on the tibia and dissected down to bone with an secure the guide to the bone.  Guide was set so that the guidepin would exit on the downsloping portion of the medial tibial spine just anterior to the PCL and in line with the interim of the anterior horn of the lateral meniscus.  Once the drill was appropriately positioned we then removed the guide.  We then confirmed good position of our tibial tunnel drill bit.  We then utilized a 9 mm reamer and treated the tibial tunnel.  We then utilized the pumpkin rasp to clear out the tunnel and make sure that the tunnel edges were smoothed to assist with graft passage.  We then proceeded with placement of a cannula into the tibial tunnel to maintain water pressure.  We then proceeded with 7 mm over-the-top guide brought to the anterior medial portal and hooked on the posterior aspect of the femur.  We hyperflexed the knee.  We set the guide so it would be in the appropriate footprint for the ACL.  We then proceeded to drill the Beath needle all the way out the proximal lateral thigh.  We then placed a Kocher on the drill bit.  We then utilized a flexible reamer 9 mm size and created our femoral tunnel to the appropriate depth to match the length of our femoral bone plug.  We then utilized a Yankauer suction to clean out the femoral tunnel of any bony debris.  We had appropriate tunnel position  and back wall thickness.  We then placed a loop suture with the loop portion through the eyelet of the Beath needle and then passed the suture limbs out the proximal lateral thigh.  We then placed a hemostat on each end.  We then took a grasper through the tibial tunnel and pulled the loop out the tibial tunnel and then replaced the hemostat.  This would be our passing stitch for the graft.  We then went to the back table and carefully removed our graft from the Graftmaster and brought to the operating table.  We passed the sutures through the suture loop and passed the sutures through and out the proximal lateral thigh.  We then guided the ACL graft through the tibial tunnel with the cancellous portion anterior laterally on the femoral bone plug.  We had marked the junction between the tendon and the bone on the femoral bone plug to make sure that we inserted it properly to the appropriate depth.  We then had the ACL graft nicely in the femoral tunnel with a femoral bone plug appropriately seated.  We then utilized a T-handle with nitinol wire, holding tension on both ends of the graft and placed the T-handle anterior to the cancellous portion of the graft and inserted a to 20 mm depth.  We then removed the T-handle leaving the guidepin in place.  We then tapped with a 6 mm tap with excellent bony purchase obtained.  We then selected a 7 x 20 mm biosure [employer] Regenesorb interference screw.  We then placed the interference screw with excellent bony purchase and fixation of the femoral bone plug in the femoral tunnel.  We then removed the arthroscopy equipment and then cycled the knee 15 times noting excellent isometry of our graft.  We had appropriate amount of the tibial bone plug in the tibial tunnel for excellent fixation with interference screw.  We held the knee fully straight and placed the T-handle plus guidewire anterior to the cancellous portion of the tibial bone plug.  We advanced the T-handle to  25 mm depth.  We then removed the T-handle leaving the nitinol wire in place.  We then tapped with a 6 mm tap and then due to excellent bony purchase selected a 7 x 25 mm interference screw [employer] biosure regenesorb.  Interference screw was placed with excellent bony purchase in the tibia and we confirmed appropriate seating of the interference screw with no prominence.  Lachman test and pivot shift test now are negative on examination.  A portion of excess tibial bone plug was gently removed with bur.  We obtained final arthroscopic pictures showing appropriate ACL graft in good position with no impingement in full extension.  We then proceeded to copiously irrigate the knee.  We then placed bone graft in the patellar harvest site.  We then closed with 2-0 Vicryl in the peritenon with mattress sutures.  We then closed subdermal tissue with opted 2-0 Vicryl and finally skin was closed with a running 3-0 Prolene subcuticular stitch.  The portal sites were closed with simple stitches with 3-0 Prolene.  The small tibial incision was closed with 2-0 Vicryl deep and then 3-0 Prolene simple stitches.      Tourniquet was released and all toes were pink and warm and distal pulses intact.  Dressing including xeroform, fluffs, ABD´s, and soft roll and ace wraps from foot to upper thigh was applied.  The leg was then placed in a post-op hinged knee brace locked in extension.     The patient was transported to the PACU in stable condition.  Patient will be weightbearing with knee brace locked straight with 2 crutches.  Patient fulfilled post-procedure discharge criteria and was released to home.  Patient and patient representatives were given detailed discharge instructions and home-going medications including Percocet for severe pain only, Zofran, Senna and DVT prophylaxis with enteric-coated aspirin.  Patient will be weightbearing on their operative extremity with knee brace locked in extension and 2 crutches.   Patient will keep the dressing and brace intact and not removed until we see him in clinic.  Dressing will be kept in place until follow-up with us in 2-5 days.  We discussed with the patient the different medications available for DVT prophylaxis and after discussion of risks and benefits the patient selected the above.  Findings were discussed with the patient and their representative after the procedure and questions were answered in detail.      Complications:  None; patient tolerated the procedure well.    Disposition: PACU - hemodynamically stable.  Condition: stable     Attending Attestation: I was present and scrubbed for the entire procedure.    Cammie Marquez  Phone Number: [phone]

## 2023-11-17 NOTE — DISCHARGE INSTRUCTIONS
Leave operative dressing in place until follow up with Dr Marquez. Then remove and leave incision open to air. Let water run freely over incision when showering, do not scrub. Do not soak in pool or tub.    Keep hinged knee brace locked in extension.    Non-Weightbearing

## 2023-11-17 NOTE — ANESTHESIA PREPROCEDURE EVALUATION
Patient: Diego Prasad    Procedure Information       Anesthesia Start Date/Time: 11/17/23 0754    Procedure: Repair Arthroscopy Anterior Cruciate Ligament Knee with BPB autograft, lateral meniscus repair (Left: Knee)    Location: [facility] OR 04 / Virtual [facility] OR    Surgeons: Cammie Marquez MD            Relevant Problems   Cardiovascular   (+) Abnormal EKG   (+) Second degree AV block       Clinical information reviewed:   Tobacco  Allergies  Meds   Med Hx  Surg Hx   Fam Hx  Soc Hx        NPO Detail:  NPO/Void Status  Date of Last Liquid: 11/16/23  Time of Last Liquid: 2100  Date of Last Solid: 11/16/23  Time of Last Solid: 2100  Time of Last Void: 0711         Physical Exam    Airway  Mallampati: I  TM distance: >3 FB  Neck ROM: full     Cardiovascular   Rhythm: regular  Rate: normal     Dental    Pulmonary   Breath sounds clear to auscultation     Abdominal   Abdomen: soft             Anesthesia Plan    ASA 1     general and regional     intravenous induction   Postoperative administration of opioids is intended.  Anesthetic plan and risks discussed with patient and mother.    Plan discussed with CAA.

## 2023-11-17 NOTE — ANESTHESIA POSTPROCEDURE EVALUATION
Patient: Diego Prasad    Procedure Summary       Date: 11/17/23 Room / Location: [facility] OR 04 / Virtual [facility] OR    Anesthesia Start: 0754 Anesthesia Stop: 1119    Procedure: Repair Arthroscopy Anterior Cruciate Ligament Knee with BPB autograft, lateral meniscus repair, partial lateral menisectomy (Left: Knee) Diagnosis:       Rupture of anterior cruciate ligament of left knee, initial encounter      Acute lateral meniscus tear of left knee, initial encounter      (Rupture of anterior cruciate ligament of left knee, initial encounter [S83.512A])      (Acute lateral meniscus tear of left knee, initial encounter [S83.282A])    Surgeons: Cammie Marquez MD Responsible Provider: Brittney Watt MD    Anesthesia Type: general ASA Status: 1            Anesthesia Type: general    Vitals Value Taken Time   /71 11/17/23 1200   Temp 36.6 °C (97.9 °F) 11/17/23 1200   Pulse 94 11/17/23 1200   Resp 20 11/17/23 1200   SpO2 100 % 11/17/23 1200       Anesthesia Post Evaluation    Patient location during evaluation: PACU  Patient participation: complete - patient participated  Level of consciousness: awake and alert  Pain score: 0  Pain management: adequate  Airway patency: patent  Cardiovascular status: acceptable  Respiratory status: acceptable  Hydration status: acceptable  Postoperative Nausea and Vomiting: none        No notable events documented.

## 2023-11-17 NOTE — ANESTHESIA PROCEDURE NOTES
Peripheral Block    Patient location during procedure: post-op  Reason for block: at surgeon's request and post-op pain management  Staffing  Performed: attending   Authorized by: Brittney Watt MD    Performed by: Brittney Watt MD  Preanesthetic Checklist  Completed: patient identified, IV checked, risks and benefits discussed, monitors and equipment checked, pre-op evaluation and timeout performed   Timeout performed at:   Peripheral Block  Patient position: laying flat  Prep: ChloraPrep  Patient monitoring: heart rate, cardiac monitor and continuous pulse ox  Block type: adductor canal  Injection technique: single-shot  Guidance: ultrasound guided  Local infiltration: lidocaine  Infiltration strength: 2 %  Dose: 5 mL  Needle  Needle gauge: 21 G  Needle length: 8 cm  Needle localization: ultrasound guidance  Assessment  Injection assessment: negative aspiration for heme, no paresthesia on injection, incremental injection and local visualized surrounding nerve on ultrasound  Heart rate change: no  Slow fractionated injection: yes  Additional Notes  Patient was placed in supine position and skin prep was applied and allowed to dry over the noted laterality above. A high frequency linear ultrasound probe was placed over the anterior/medial thigh with corresponding anatomical and vascular structures noted. 5 ml of 2% lidocaine was used for topicalization. An echogenic needle was placed with in-line visualization via ultrasound and 30 ml of 0.5% ropivacaine was injected in the adductor canal. All local anesthestic was administered in incremental doses with negative aspiration noted between. Vital signs remained stable and no paresthesias were noted throughout the procedure.

## 2023-11-17 NOTE — ANESTHESIA PROCEDURE NOTES
Airway  Date/Time: 11/17/2023 8:03 AM    Staffing  Performed: SABAS   Authorized by: Brittney Watt MD    Performed by: SABAS Mak  Patient location during procedure: OR    Indications and Patient Condition  Indications for airway management: anesthesia and airway protection  Spontaneous ventilation: present  Sedation level: deep  Preoxygenated: yes  Patient position: sniffing  MILS not maintained throughout  Mask difficulty assessment: 0 - not attempted    Final Airway Details  Final airway type: supraglottic airway      Successful airway: Supraglottic airway: igel.  Size 5     Number of attempts at approach: 1

## 2023-11-17 NOTE — H&P
History Of Present Illness  Diego Prasad is a 23 y.o. male presenting with left knee pain related to acl tear and meniscal tear.     Past Medical History  History reviewed. No pertinent past medical history.    Surgical History  History reviewed. No pertinent surgical history.     Social History  He reports that he has never smoked. He has never used smokeless tobacco. He reports current alcohol use of about 5.0 standard drinks of alcohol per week. He reports current drug use. Drug: Marijuana.    Family History  No family history on file.     Allergies  Patient has no known allergies.    Review of Systems     Physical Exam positive lachman, positive alan test left knee     Last Recorded Vitals  Blood pressure 136/81, pulse 70, temperature 36.4 °C (97.5 °F), temperature source Temporal, resp. rate 16, height 1.829 m (6'), weight 76.7 kg (169 lb 1.5 oz), SpO2 100 %.    Assessment/Plan   Active Problems:    Left anterior cruciate ligament tear    Acute lateral meniscus tear of left knee      Presents for surgery, patient's questions answered in detail.        Cammie Marquez MD

## 2023-11-20 ENCOUNTER — TREATMENT (OUTPATIENT)
Dept: PHYSICAL THERAPY | Facility: HOSPITAL | Age: 23
End: 2023-11-20
Payer: COMMERCIAL

## 2023-11-20 ENCOUNTER — OFFICE VISIT (OUTPATIENT)
Dept: ORTHOPEDIC SURGERY | Facility: HOSPITAL | Age: 23
End: 2023-11-20
Payer: COMMERCIAL

## 2023-11-20 DIAGNOSIS — M62.559 ATROPHY OF QUADRICEPS FEMORIS MUSCLE: ICD-10-CM

## 2023-11-20 DIAGNOSIS — R26.89 BALANCE PROBLEMS: ICD-10-CM

## 2023-11-20 DIAGNOSIS — S83.282A ACUTE LATERAL MENISCUS TEAR OF LEFT KNEE, INITIAL ENCOUNTER: Primary | ICD-10-CM

## 2023-11-20 DIAGNOSIS — M25.562 ACUTE PAIN OF LEFT KNEE: ICD-10-CM

## 2023-11-20 DIAGNOSIS — S83.512A RUPTURE OF ANTERIOR CRUCIATE LIGAMENT OF LEFT KNEE, INITIAL ENCOUNTER: ICD-10-CM

## 2023-11-20 DIAGNOSIS — R26.2 DIFFICULTY WALKING: ICD-10-CM

## 2023-11-20 DIAGNOSIS — M25.462 EFFUSION OF LEFT KNEE: ICD-10-CM

## 2023-11-20 DIAGNOSIS — M25.662 DECREASED RANGE OF MOTION OF LEFT KNEE: ICD-10-CM

## 2023-11-20 DIAGNOSIS — Z47.89 ORTHOPEDIC AFTERCARE: Primary | ICD-10-CM

## 2023-11-20 DIAGNOSIS — R29.898 LOWER EXTREMITY WEAKNESS: ICD-10-CM

## 2023-11-20 PROCEDURE — 97016 VASOPNEUMATIC DEVICE THERAPY: CPT | Mod: GP

## 2023-11-20 PROCEDURE — 97112 NEUROMUSCULAR REEDUCATION: CPT | Mod: GP

## 2023-11-20 PROCEDURE — 97140 MANUAL THERAPY 1/> REGIONS: CPT | Mod: GP

## 2023-11-20 PROCEDURE — 97164 PT RE-EVAL EST PLAN CARE: CPT | Mod: 59,GP

## 2023-11-20 PROCEDURE — 1036F TOBACCO NON-USER: CPT | Performed by: PHYSICIAN ASSISTANT

## 2023-11-20 PROCEDURE — 97110 THERAPEUTIC EXERCISES: CPT | Mod: GP

## 2023-11-20 PROCEDURE — 99024 POSTOP FOLLOW-UP VISIT: CPT | Performed by: PHYSICIAN ASSISTANT

## 2023-11-20 ASSESSMENT — PAIN SCALES - GENERAL
PAINLEVEL_OUTOF10: 5 - MODERATE PAIN
PAINLEVEL_OUTOF10: 7

## 2023-11-20 ASSESSMENT — PAIN - FUNCTIONAL ASSESSMENT: PAIN_FUNCTIONAL_ASSESSMENT: 0-10

## 2023-11-20 NOTE — PATIENT INSTRUCTIONS
Wear your brace locked in extension using crutches    The patient was given a prescription for physical therapy.  Physical therapy is medically necessary to improve strength, balance, range of motion and functional outcomes after injury and/or surgery.    Keep your incisions clean and dry    Ice and elevate supported at the calf with no pressure on the heel to reduce swelling.    Continue your aspirin daily with food for blood clot prevention    Follow up in 2 weeks for suture removal

## 2023-11-20 NOTE — PROGRESS NOTES
Physical Therapy  Physical Therapy Orthopedic Evaluation    Patient Name: Diego Prasad  MRN: [id number]  Today's Date: 11/20/2023  Time Calculation  Start Time: 1300  Stop Time: 1440  Time Calculation (min): 100 min    Insurance:  Visit number: 10 of 22 (25 visits/year, has used 3 previously)  Authorization info: no auth  Insurance Type: [employer] choice+ vaso ok    General:  Reason for visit: L ACLR PBTP  Referred by: Dr. Marquez    Current Problem  1. Orthopedic aftercare        2. Acute pain of left knee        3. Atrophy of quadriceps femoris muscle        4. Lower extremity weakness        5. Decreased range of motion of left knee        6. Balance problems        7. Difficulty walking        8. Effusion of left knee            Precautions: WBAT       Medical History Form: Reviewed (scanned into chart)    Subjective:     Chief Complaint: Patient presents to clinic POD 3 s/p L ACLR PBTB and partial lateral menisectomy  Injury Date: 9/18/23  Surgery Date: 11/17/23  LAUREN: Football    Current Condition:   Worse (due to surgery)    patient is here for evaluation POD 3 s/p ACLR BPTB with partial lateral menisectomy    current pain/symptoms: knee is painful with movements or jarring of the leg/ankle, has had difficulty getting up/down stairs so spending most time laying down but has tried to get up and move a little  numbness/tingling: notes intermittent instances of foot falling asleep   mechanical symptoms: none  post-op screening (PE, DVT, etc.): denies chest pain, shortness of breath, calf pain    Functional limitations: stairs, sit <> stand, transfers    current medications/modalities: taking all prescribed medications including pain meds, aspirin; icing with ice machine      Pain:  Pain Assessment: 0-10  Pain Score: 7  Location: L knee  Description: dull    Previous Interventions/Treatments: Physical Therapy    Prior Level of Function (PLOF)  Patient previously independent with all ADLs  Exercise/Physical  Activity: college football/coaching  Work/School: [employer]    Patients Living Environment: Reviewed and no concern    Primary Language: English    Patient's Goal(s) for Therapy: return to unrestricted athletic activity, football    Red Flags: Do you have any of the following? No  Fever/chills, unexplained weight changes, dizziness/fainting, unexplained change in bowel or bladder functions, unexplained malaise or muscle weakness, night pain/sweats, numbness or tingling  Signs of DVT including: Pain, swelling or tenderness to calf, warm or red skin, previous history of DVT    Objective:  Patient presents to clinic with bilateral axillary crutches and knee brace locked in extension.    Surgical sight inspected: No signs of infection; Stitches intact and wound healing well.        ROM    Knee AROM (Degrees)      (R)  (L)  Flexion:   18 (assisted)   Extension:   Can activate in 0deg      Knee PROM (Degrees)      (R)  (L)  Flexion:      Extension:   +8        Strength Testing  *No strength testing performed secondary to patient being post-op. Will be assessed at follow up visit.    Pt can perform quad set with inconsistent tetany.     Knee Edema Measurements:    Sweep test: +3, pitting edema extending into shin      Patella Mobility: Moderately restricted superior, inferior, medial, and lateral patella mobility noted on left secondary to effusion and pain.    Palpation: generally tender to light touch and palpation over anterior knee      Outcome Measures:  Other Measures  Lower Extremity Funtional Score (LEFS): 7     EDUCATION: home exercise program, plan of care, activity modifications, pain management, and injury pathology         Goals: Set and discussed today  Active       PT Problem       PT Goal 1       Start:  10/16/23    Expected End:  02/12/24       Short Term Goals (To be met within 2-10 weeks):  1. 10/16/2023 Pt will demo understanding of and compliance with HEP to progress towards long term goal.  2. 10/16/2023  "The patient will demonstrate full active knee extension equivalent to the uninvolved side to improve quality of gait, quad activation, and progression towards long term goals.  3. 10/16/2023 Pt will demo ability to perform 10 SLR without quad lag to progress towards long term goal.  4. 10/16/2023 Pt will demo decreased swelling to +1 or less as assessed with stroke test to progress towards long term goals.  5. 10/16/2023 Pt will demo normalized gait pattern with/without use of assistive device to progress towards long term goals           PT Goal 2       Start:  10/16/23    Expected End:  08/20/24       Long Term Goals (12+ weeks and on)  10/16/2023 The patient will demonstrate >70% LSI isometric quad strength assessed at 60 deg flexion compared to the uninvolved side at 12-16 weeks post op.  2. 10/16/2023 Pt will be independent and compliant with home program in order to safely return to sport.  3. 10/16/2023 Pt will increase LEFS outcome score to >90 pt's to demonstrate improved functional mobility for performance of ADL's and IADL's.  4. 10/16/2023 ROM: full, pain free knee ROM, symmetrical with the uninvolved limb in order to safely return to sport.  5. 10/16/2023 Strength: Isokinetic testing 90% or greater for hamstring and quad at 60º/sec and 300º/sec in order to safely return to sport.  6. 10/16/2023 Effusion: No reactive effusion >1+ with sport-specific activity in order to safely return to sport.  7. 10/16/2023 Functional Hop Testing: LSI 90% with appropriate mechanics and force attenuation strategies for all tests in order to safely return to sport.  8. 10/16/2023 Pt will score <17 on TSK-11 and score >77 on ACL-RSI to demonstrate appropriate psychological readiness for RTS without risk of reinjury.             Plan of care was developed with input and agreement by the patient      Treatment Performed:    Therapeutic Exercise:    40 min  Quad set over towel roll 10\" x10  Quad set with strap assist 10\" " x10  Assisted heel slide x20  Pt/family educated regarding signs/symptoms of DVT including calf swelling, redness, warmth; with instructions for evaluation at ED as indicated  pt education regarding effusion management including guidelines for compression and icing and anti embolic exercises  Pt education regarding priorities for acute ACL rehab including knee extension, quad activation, normal gait, and effusion management    Manual Therapy:    15 min  Effleurage to shin, knee for effusion management  Grade 1 patellar mobs all directions to tolerance    Neuromuscular Re-education:  15 min  Mtrigger biofeedback 10/10 on/off quad set over towel roll x8min  Goal 250  Includes time for set up  Ambulation in clinic with cues for heel strike, great toe push off, WBAT    Other:     20 min  Vaso cold temp lite compression x20 min in elevation      Assessment: Patient presents to clinic POD 3 s/p ACLR BPTB with partial lateral menisectomy. Clinical examination reveals pain, edema, and impaired mobility; strength, balance, and functional mobility to be assessed at future visit secondary to post op status. Patient is currently limited in all functional mobility; unable to bend/straighten knee fully, ambulate, negotiate stairs, squat, kneel or participate in previous active lifestyle secondary to surgery. Patient will benefit from skilled PT intervention to return to all ADLs, community ambulation and recreational activities symptom free.         Plan:   Next session: quad activation, NMES, gait training for WBAT    HEP: [id number]     Planned Interventions include: therapeutic exercise, self-care home management, manual therapy, therapeutic activities, gait training, neuromuscular coordination, vasopneumatic, dry needling, aquatic therapy  Frequency: 2 x Week  Duration: 9 Months      Enriqueta Almonte, PT

## 2023-11-20 NOTE — PROGRESS NOTES
NPV-   History of Present Illness  23 y.o.male here for pov s/p left knee arthroscopic-assisted ACL-R with BPB autograft and partial lateral meniscectomy on 11/17/23. Patient reports they are doing well. Minimal pain. NWB in brace. Taking asa      On examination of left knee, incision is clean/dry/intact.  Sutures are intact.  No bleeding or drainage. Healing well.  Minimal swelling. Improved ROM and strength.  Neurovascularly intact.  Normal sensation to light touch.  Dorsalis pedis and posterior tibial pulses 2+ bilaterally. wiggles toes, calf soft and nontender    A/P: s/p left knee arthroscopic-assisted ACL-R with BPB autograft and partial lateral meniscectomy on 11/17/23.  -  Patient is doing well  - Brace removed and dressings changed  - He will continue be WBAT in his brace locked in extension  - The patient was given a prescription for physical therapy.  Physical therapy is medically necessary to improve strength, balance, range of motion and functional outcomes after injury and/or surgery.  - Continue your aspirin daily with food for blood clot prevention  - All the patient's questions were answered. The patient agrees with the above plan.  Follow up in 2-3 weeks for suture removal

## 2023-11-24 ENCOUNTER — TREATMENT (OUTPATIENT)
Dept: PHYSICAL THERAPY | Facility: HOSPITAL | Age: 23
End: 2023-11-24
Payer: COMMERCIAL

## 2023-11-24 DIAGNOSIS — R26.89 BALANCE PROBLEMS: ICD-10-CM

## 2023-11-24 DIAGNOSIS — M25.462 EFFUSION OF LEFT KNEE: ICD-10-CM

## 2023-11-24 DIAGNOSIS — R29.898 LOWER EXTREMITY WEAKNESS: ICD-10-CM

## 2023-11-24 DIAGNOSIS — M25.562 ACUTE PAIN OF LEFT KNEE: ICD-10-CM

## 2023-11-24 DIAGNOSIS — Z47.89 ORTHOPEDIC AFTERCARE: Primary | ICD-10-CM

## 2023-11-24 DIAGNOSIS — R26.2 DIFFICULTY WALKING: ICD-10-CM

## 2023-11-24 DIAGNOSIS — M25.469 KNEE SWELLING: ICD-10-CM

## 2023-11-24 DIAGNOSIS — M62.559 ATROPHY OF QUADRICEPS FEMORIS MUSCLE: ICD-10-CM

## 2023-11-24 DIAGNOSIS — M25.662 DECREASED RANGE OF MOTION OF LEFT KNEE: ICD-10-CM

## 2023-11-24 PROCEDURE — 97110 THERAPEUTIC EXERCISES: CPT | Mod: GP

## 2023-11-24 PROCEDURE — 97016 VASOPNEUMATIC DEVICE THERAPY: CPT | Mod: GP

## 2023-11-24 PROCEDURE — 97112 NEUROMUSCULAR REEDUCATION: CPT | Mod: GP

## 2023-11-24 PROCEDURE — 97140 MANUAL THERAPY 1/> REGIONS: CPT | Mod: GP

## 2023-11-24 ASSESSMENT — PAIN - FUNCTIONAL ASSESSMENT: PAIN_FUNCTIONAL_ASSESSMENT: 0-10

## 2023-11-24 ASSESSMENT — PAIN SCALES - GENERAL: PAINLEVEL_OUTOF10: 8

## 2023-11-24 NOTE — PROGRESS NOTES
Physical Therapy  Physical Therapy Orthopedic Evaluation    Patient Name: Diego Prasad  MRN: [id number]  Today's Date: 11/24/2023       Insurance:  Visit number: 11 of 22 (25 visits/year, has used 3 previously)  Authorization info: no auth  Insurance Type: [facility] choice+ vaso ok    General:  Reason for visit: L ACLR PBTP  Referred by: Dr. Marquez    Current Problem  1. Orthopedic aftercare        2. Acute pain of left knee        3. Atrophy of quadriceps femoris muscle        4. Lower extremity weakness        5. Decreased range of motion of left knee        6. Balance problems        7. Difficulty walking        8. Effusion of left knee        9. Knee swelling              Precautions: WBAT       Medical History Form: Reviewed (scanned into chart)    Subjective:   Pt reports no major changes since last visit, notes some pretty significant knee pain this morning, mostly in proximal shin and medial knee. Getting some muscle spasms too especially when sleeping at night, but notes he is able to sleep okay and has been using prescribed pain medication and melatonin.    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 No  Fever/chills, unexplained weight changes, dizziness/fainting, unexplained change in bowel or bladder functions, unexplained malaise or muscle weakness, night pain/sweats, numbness or tingling  Signs of DVT including: Pain, swelling or tenderness to calf, warm or red skin, previous history of DVT    Objective:  Patient presents to clinic with  bilateral axillary crutches and knee brace locked in extension.    Surgical sight inspected: No signs of infection; Stitches intact and wound healing well.        ROM    Knee AROM (Degrees)      (R)  (L)  Flexion:   18 (assisted)   Extension:   Can activate in 0deg      Knee PROM (Degrees)      (R)  (L)  Flexion:      Extension:   +8        Strength Testing  *No strength testing performed secondary to patient being post-op. Will be assessed at follow up visit.    Pt can perform quad set with inconsistent tetany.     Knee Edema Measurements:    Sweep test: +3, pitting edema extending into shin      Patella Mobility: Moderately restricted superior, inferior, medial, and lateral patella mobility noted on left secondary to effusion and pain.    Palpation: generally tender to light touch and palpation over anterior knee      Outcome Measures:        EDUCATION: home exercise program, plan of care, activity modifications, pain management, and injury pathology       Plan of care was developed with input and agreement by the patient      Treatment Performed:    Therapeutic Exercise:    39 min  Assisted heel slide x10  *DL heel raise x10  Mini squat at bar x10  Medial/lateral weight shift x10  Reiteration of dosage for quad work throughout the day and importance of frequent performance  Pt education regarding priorities for acute ACL rehab including knee extension, quad activation, normal gait, and effusion management    Manual Therapy:    15 min  STM to hamstrings, quad, popliteus  Grade 1 patellar mobs all directions to tolerance    Neuromuscular Re-education:  20 min  Mtrigger biofeedback 10/10 on/off quad set over towel roll x8min  Focal joint cooling x5 min pre exercise  Goal 150  Includes time for set up  NMES Slovak to tolerance  Quad set over towel roll x15 reps  Ambulation in clinic with cues for heel strike, great toe push off, WBAT    Other:     20 min  Vaso cold temp lite compression x20 min      Assessment:   Pt  demonstrates significant difficulty with quad activation and weightbearing, able to minimally increase both throughout session but with difficulty and increased knee pain. Improved tolerance to patellar mobility and soft tissue work this session, but still has notable tenderness throughout anterior knee and general difficulty with activity tolerance.      Plan:  continue quad activation, NMES, gait training for WBAT  Consider Alter G for weightbearing    HEP: [id number]         Enriqueta Almonte, PT

## 2023-11-27 ENCOUNTER — TREATMENT (OUTPATIENT)
Dept: PHYSICAL THERAPY | Facility: HOSPITAL | Age: 23
End: 2023-11-27
Payer: COMMERCIAL

## 2023-11-27 DIAGNOSIS — M62.559 ATROPHY OF QUADRICEPS FEMORIS MUSCLE: ICD-10-CM

## 2023-11-27 DIAGNOSIS — M25.469 KNEE SWELLING: ICD-10-CM

## 2023-11-27 DIAGNOSIS — R29.898 LOWER EXTREMITY WEAKNESS: ICD-10-CM

## 2023-11-27 DIAGNOSIS — M25.562 ACUTE PAIN OF LEFT KNEE: ICD-10-CM

## 2023-11-27 DIAGNOSIS — R26.89 BALANCE PROBLEMS: ICD-10-CM

## 2023-11-27 DIAGNOSIS — M25.662 DECREASED RANGE OF MOTION OF LEFT KNEE: ICD-10-CM

## 2023-11-27 DIAGNOSIS — S83.512D RUPTURE OF ANTERIOR CRUCIATE LIGAMENT OF LEFT KNEE, SUBSEQUENT ENCOUNTER: Primary | ICD-10-CM

## 2023-11-27 DIAGNOSIS — Z47.89 ORTHOPEDIC AFTERCARE: Primary | ICD-10-CM

## 2023-11-27 DIAGNOSIS — R26.2 DIFFICULTY WALKING: ICD-10-CM

## 2023-11-27 PROCEDURE — 97016 VASOPNEUMATIC DEVICE THERAPY: CPT | Mod: GP

## 2023-11-27 PROCEDURE — 97140 MANUAL THERAPY 1/> REGIONS: CPT | Mod: GP

## 2023-11-27 PROCEDURE — 97112 NEUROMUSCULAR REEDUCATION: CPT | Mod: GP

## 2023-11-27 PROCEDURE — 97110 THERAPEUTIC EXERCISES: CPT | Mod: GP

## 2023-11-27 RX ORDER — HYDROCODONE BITARTRATE AND ACETAMINOPHEN 5; 325 MG/1; MG/1
1 TABLET ORAL EVERY 8 HOURS PRN
Qty: 20 TABLET | Refills: 0 | Status: SHIPPED | OUTPATIENT
Start: 2023-11-27 | End: 2023-12-04

## 2023-11-27 ASSESSMENT — PAIN SCALES - GENERAL: PAINLEVEL_OUTOF10: 3

## 2023-11-27 ASSESSMENT — PAIN - FUNCTIONAL ASSESSMENT: PAIN_FUNCTIONAL_ASSESSMENT: 0-10

## 2023-11-27 NOTE — PROGRESS NOTES
Physical Therapy  Physical Therapy Orthopedic Evaluation    Patient Name: Diego Prasad  MRN: [id number]  Today's Date: 11/27/2023       Insurance:  Visit number: 12 of 22 (25 visits/year, has used 3 previously)  Authorization info: no auth  Insurance Type: [facility] choice+ vaso ok    General:  Reason for visit: L ACLR PBTP  Referred by: Dr. Marquez    Current Problem  1. Orthopedic aftercare        2. Knee swelling        3. Difficulty walking        4. Balance problems        5. Decreased range of motion of left knee        6. Lower extremity weakness        7. Atrophy of quadriceps femoris muscle        8. Acute pain of left knee                Precautions: WBAT       Medical History Form: Reviewed (scanned into chart)    Subjective:   Pt reports his knee feels like it's getting better, took his last dose of prescription pain meds this morning which has been helpful for pain relief. Notes he isn't able to bend his knee or lift up his leg on his own yet.    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 No  Fever/chills, unexplained weight changes, dizziness/fainting, unexplained change in bowel or bladder functions, unexplained malaise or muscle weakness, night pain/sweats, numbness or tingling  Signs of DVT including: Pain, swelling or tenderness to calf, warm or red skin, previous history of DVT    Objective:  Patient presents to clinic with bilateral axillary crutches and knee brace locked in extension.    Surgical sight inspected: No signs of infection;  Stitches intact and wound healing well.        ROM    Knee AROM (Degrees)      (R)  (L)  Flexion:   18 (assisted)   Extension:   Can activate in 0deg      Knee PROM (Degrees)      (R)  (L)  Flexion:      Extension:   +8        Strength Testing  *No strength testing performed secondary to patient being post-op. Will be assessed at follow up visit.    Pt can perform quad set with inconsistent tetany.     Knee Edema Measurements:    Sweep test: +3, pitting edema extending into shin      Patella Mobility: Moderately restricted superior, inferior, medial, and lateral patella mobility noted on left secondary to effusion and pain.    Palpation: generally tender to light touch and palpation over anterior knee      Outcome Measures:        EDUCATION: home exercise program, plan of care, activity modifications, pain management, and injury pathology       Plan of care was developed with input and agreement by the patient      Treatment Performed:    Therapeutic Exercise:    34 min  Shuttle level 1 2x10  Mini squat at bar 2x10  Reiteration of dosage for quad work throughout the day and importance of frequent performance  Pt education regarding priorities for acute ACL rehab including knee extension, quad activation, normal gait, and effusion management  NP:  *DL heel raise x10  Medial/lateral weight shift x10    Manual Therapy:    15 min  STM to hamstrings, quad, popliteus  Grade 1 patellar mobs all directions to tolerance (NP)  Assisted knee flexion    Neuromuscular Re-education:  25 min  Mtrigger biofeedback 10/10 on/off quad set over towel roll x8min  Goal 200  Includes time for set up  NMES Egyptian to tolerance  Quad set over towel roll 2x15 reps with/without strap  Ambulation in clinic with cues for heel strike, great toe push off, WBAT    Other:     20 min  Vaso cold temp lite compression x20 min      Assessment:  Pt demonstrated significantly improved quad activation during biofeedback training, with higher percentage of  time achieving goal and improved tetany during quad set. Still has significant difficulty with open and closed chain knee flexion but improved compared to last visit. Continues to maintain good passive extension range of motion. Extra time allowed for transfers and patient positioning.      Plan:  Alter G for gait training, squats, SL balance if tolerated  NMES followed by biofeedback      HEP: [id number]         Enriqueta Almonte, PT

## 2023-11-29 NOTE — PROGRESS NOTES
Physical Therapy  Physical Therapy Orthopedic Evaluation    Patient Name: Diego Prasad  MRN: [id number]  Today's Date: 11/30/2023       Insurance:  Visit number: 13 of 22 (25 visits/year, has used 3 previously)  Authorization info: no auth  Insurance Type: [facility] choice+ vaso ok    General:  Reason for visit: L ACLR PBTP  Referred by: Dr. Marquez    Current Problem  1. Orthopedic aftercare        2. Knee swelling        3. Difficulty walking        4. Balance problems        5. Decreased range of motion of left knee        6. Lower extremity weakness        7. Atrophy of quadriceps femoris muscle        8. Acute pain of left knee                  Precautions: WBAT       Medical History Form: Reviewed (scanned into chart)    Subjective:   Pt reports he feels like he is getting around easier and is sleeping better. Has been having discomfort in his hamstring between the two brace straps. Still taking norco in the mornings, tylenol as needed. Will have ortho follow up Monday morning.    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 No  Fever/chills, unexplained weight changes, dizziness/fainting, unexplained change in bowel or bladder functions, unexplained malaise or muscle weakness, night pain/sweats, numbness or tingling  Signs of DVT including: Pain, swelling or tenderness to calf, warm or red skin, previous history of DVT    Objective:  Patient presents to clinic with bilateral axillary crutches and knee brace locked in extension.    Surgical sight  inspected: No signs of infection; Stitches intact and wound healing well.        ROM    Knee AROM (Degrees)      (R)  (L)  Flexion:   18 (assisted); able to flex to 30 over side of table with assist  Extension:   Can activate in 0deg      Knee PROM (Degrees)      (R)  (L)  Flexion:      Extension:   +8        Strength Testing  *No strength testing performed secondary to patient being post-op. Will be assessed at follow up visit.    Pt can perform quad set with inconsistent tetany.     Knee Edema Measurements:    Sweep test: +3, pitting edema extending into shin      Patella Mobility: Moderately restricted superior, inferior, medial, and lateral patella mobility noted on left secondary to effusion and pain.    Palpation: generally tender to light touch and palpation over anterior knee      Outcome Measures:        EDUCATION: home exercise program, plan of care, activity modifications, pain management, and injury pathology       Plan of care was developed with input and agreement by the patient      Treatment Performed:    Therapeutic Exercise:    16 min  Short sitting knee flexion  Dressing change  Education on brace locking/unlocking - pt can unlock to 40deg for ambulation but should keep brace locked in ext for all sitting/resting  Reiteration of dosage for quad work throughout the day and importance of frequent performance  Pt education regarding priorities for acute ACL rehab including knee extension, quad activation, normal gait, and effusion management  NP:  *DL heel raise x10  Medial/lateral weight shift x10  Shuttle level 1 2x10  Mini squat at bar 2x10    Manual Therapy:    15 min  STM to hamstrings, quad, popliteus  Grade 1 patellar mobs all directions to tolerance (NP)  Assisted knee flexion    Neuromuscular Re-education:  35 min  Mtrigger biofeedback 10/10 on/off quad set over towel roll x8min  Goal 250  Includes time for set up  NMES Palestinian to tolerance (13 min)  Quad set over towel roll x15 reps  Quad  set in heel prop x15 reps  Ambulation in clinic with cues for heel strike, great toe push off, WBAT    Other: gait training; vaso    30; 20 min  Alter G 50% BW ambulation with cues for heel strike  Mini squat x15  SL balance   Includes ambulation to/from Alter G in clinic  Vaso cold temp lite compression x20 min      Assessment:  Pt continues to present with consistent minimal improvements in quad activity during NMES and biofeedback, evidenced by higher goal on biofeedback each session. However, patient continues to have significant difficulty with knee flexion and weightbearing on the surgical extremity; improved active flexion and heel/toe gait pattern noted during training in AlterG at 50% BW, with pt reporting minor knee discomfort by end of session.  Extra time required for pt difficulty with transfers and activity tolerance due to limited knee mobility and strength.        Plan:  Knee flexion mobility  Continue Alter G for weightbearing  NMES followed by biofeedback  Flexion work as time allows      HEP: [id number]         Enriqueta Almonte, PT

## 2023-11-30 ENCOUNTER — TREATMENT (OUTPATIENT)
Dept: PHYSICAL THERAPY | Facility: HOSPITAL | Age: 23
End: 2023-11-30
Payer: COMMERCIAL

## 2023-11-30 DIAGNOSIS — M25.469 KNEE SWELLING: ICD-10-CM

## 2023-11-30 DIAGNOSIS — R26.89 BALANCE PROBLEMS: ICD-10-CM

## 2023-11-30 DIAGNOSIS — M62.559 ATROPHY OF QUADRICEPS FEMORIS MUSCLE: ICD-10-CM

## 2023-11-30 DIAGNOSIS — M25.562 ACUTE PAIN OF LEFT KNEE: ICD-10-CM

## 2023-11-30 DIAGNOSIS — R29.898 LOWER EXTREMITY WEAKNESS: ICD-10-CM

## 2023-11-30 DIAGNOSIS — R26.2 DIFFICULTY WALKING: ICD-10-CM

## 2023-11-30 DIAGNOSIS — Z47.89 ORTHOPEDIC AFTERCARE: Primary | ICD-10-CM

## 2023-11-30 DIAGNOSIS — M25.662 DECREASED RANGE OF MOTION OF LEFT KNEE: ICD-10-CM

## 2023-11-30 PROCEDURE — 97116 GAIT TRAINING THERAPY: CPT | Mod: GP

## 2023-11-30 PROCEDURE — 97140 MANUAL THERAPY 1/> REGIONS: CPT | Mod: GP

## 2023-11-30 PROCEDURE — 97110 THERAPEUTIC EXERCISES: CPT | Mod: GP

## 2023-11-30 PROCEDURE — 97112 NEUROMUSCULAR REEDUCATION: CPT | Mod: GP

## 2023-11-30 PROCEDURE — 97016 VASOPNEUMATIC DEVICE THERAPY: CPT | Mod: GP

## 2023-11-30 ASSESSMENT — PAIN - FUNCTIONAL ASSESSMENT: PAIN_FUNCTIONAL_ASSESSMENT: 0-10

## 2023-11-30 ASSESSMENT — PAIN SCALES - GENERAL: PAINLEVEL_OUTOF10: 4

## 2023-12-04 ENCOUNTER — APPOINTMENT (OUTPATIENT)
Dept: ORTHOPEDIC SURGERY | Facility: HOSPITAL | Age: 23
End: 2023-12-04
Payer: COMMERCIAL

## 2023-12-04 ENCOUNTER — OFFICE VISIT (OUTPATIENT)
Dept: ORTHOPEDIC SURGERY | Facility: HOSPITAL | Age: 23
End: 2023-12-04
Payer: COMMERCIAL

## 2023-12-04 ENCOUNTER — TREATMENT (OUTPATIENT)
Dept: PHYSICAL THERAPY | Facility: HOSPITAL | Age: 23
End: 2023-12-04
Payer: COMMERCIAL

## 2023-12-04 ENCOUNTER — APPOINTMENT (OUTPATIENT)
Dept: PHYSICAL THERAPY | Facility: HOSPITAL | Age: 23
End: 2023-12-04
Payer: COMMERCIAL

## 2023-12-04 DIAGNOSIS — M62.559 ATROPHY OF QUADRICEPS FEMORIS MUSCLE: ICD-10-CM

## 2023-12-04 DIAGNOSIS — S83.282A ACUTE LATERAL MENISCUS TEAR OF LEFT KNEE, INITIAL ENCOUNTER: ICD-10-CM

## 2023-12-04 DIAGNOSIS — Z47.89 ORTHOPEDIC AFTERCARE: Primary | ICD-10-CM

## 2023-12-04 DIAGNOSIS — M25.662 DECREASED RANGE OF MOTION OF LEFT KNEE: ICD-10-CM

## 2023-12-04 DIAGNOSIS — R29.898 LOWER EXTREMITY WEAKNESS: ICD-10-CM

## 2023-12-04 DIAGNOSIS — M25.469 KNEE SWELLING: ICD-10-CM

## 2023-12-04 DIAGNOSIS — R26.89 BALANCE PROBLEMS: ICD-10-CM

## 2023-12-04 DIAGNOSIS — S83.512A RUPTURE OF ANTERIOR CRUCIATE LIGAMENT OF LEFT KNEE, INITIAL ENCOUNTER: Primary | ICD-10-CM

## 2023-12-04 DIAGNOSIS — R26.2 DIFFICULTY WALKING: ICD-10-CM

## 2023-12-04 DIAGNOSIS — M25.562 ACUTE PAIN OF LEFT KNEE: ICD-10-CM

## 2023-12-04 PROCEDURE — 97140 MANUAL THERAPY 1/> REGIONS: CPT | Mod: GP

## 2023-12-04 PROCEDURE — 99024 POSTOP FOLLOW-UP VISIT: CPT | Performed by: PHYSICIAN ASSISTANT

## 2023-12-04 PROCEDURE — 97110 THERAPEUTIC EXERCISES: CPT | Mod: GP

## 2023-12-04 PROCEDURE — 1036F TOBACCO NON-USER: CPT | Performed by: PHYSICIAN ASSISTANT

## 2023-12-04 PROCEDURE — 97016 VASOPNEUMATIC DEVICE THERAPY: CPT | Mod: GP

## 2023-12-04 PROCEDURE — 97112 NEUROMUSCULAR REEDUCATION: CPT | Mod: GP

## 2023-12-04 ASSESSMENT — PAIN SCALES - GENERAL: PAINLEVEL_OUTOF10: 2

## 2023-12-04 ASSESSMENT — PAIN - FUNCTIONAL ASSESSMENT: PAIN_FUNCTIONAL_ASSESSMENT: 0-10

## 2023-12-04 NOTE — PATIENT INSTRUCTIONS
Continue your brace locked in extension using crutches    The patient was given a prescription for physical therapy.  Physical therapy is medically necessary to improve strength, balance, range of motion and functional outcomes after injury and/or surgery.    1. Follow stretching exercises that were discussed in clinic  2. Hold each stretch for a least 1 minute  3. Do not bounce while stretching  4. Stretch for 10 minutes at a time, 3x a day for 6 weeks then daily  5. Remember, it takes several weeks to a few months of consistent stretching to increase flexibility and decrease symptoms.     Ice and elevate supported at the calf with no pressure on the heel to reduce swelling.    Follow up in 3 weeks

## 2023-12-04 NOTE — PROGRESS NOTES
Physical Therapy  Physical Therapy Orthopedic Evaluation    Patient Name: Diego Prasad  MRN: [id number]  Today's Date: 12/4/2023       Insurance:  Visit number: 14 of 22 (25 visits/year, has used 3 previously)  Authorization info: no auth  Insurance Type: [facility] choice+ vaso ok    General:  Reason for visit: L ACLR PBTP  Referred by: Dr. Marquez    Current Problem  1. Orthopedic aftercare        2. Knee swelling        3. Difficulty walking        4. Balance problems        5. Decreased range of motion of left knee        6. Lower extremity weakness        7. Atrophy of quadriceps femoris muscle        8. Acute pain of left knee                    Precautions: WBAT       Medical History Form: Reviewed (scanned into chart)    Subjective:   Pt had ortho follow up and stitches taken out this morning, no major concerns or updates. Knee feels okay, had some pain off and on over the weekend. Has been working more on quad sets and other exercises in HEP.    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 No  Fever/chills, unexplained weight changes, dizziness/fainting, unexplained change in bowel or bladder functions, unexplained malaise or muscle weakness, night pain/sweats, numbness or tingling  Signs of DVT including: Pain, swelling or tenderness to calf, warm or red skin, previous history of DVT    Objective:  Patient presents to clinic with bilateral axillary crutches and knee brace locked in extension.    Surgical sight inspected: No signs of  infection; Stitches intact and wound healing well.        ROM    Knee AROM (Degrees)      (R)  (L)  Flexion:   18 (assisted); able to flex to 30 over side of table with assist  Extension:   Can activate in 0deg      Knee PROM (Degrees)      (R)  (L)  Flexion:      Extension:   +8        Strength Testing  *No strength testing performed secondary to patient being post-op. Will be assessed at follow up visit.    Pt can perform quad set with inconsistent tetany.     Knee Edema Measurements:    Sweep test: +3, pitting edema extending into shin      Patella Mobility: Moderately restricted superior, inferior, medial, and lateral patella mobility noted on left secondary to effusion and pain.    Palpation: generally tender to light touch and palpation over anterior knee      Outcome Measures:        EDUCATION: home exercise program, plan of care, activity modifications, pain management, and injury pathology       Plan of care was developed with input and agreement by the patient      Treatment Performed:    Therapeutic Exercise:    30 min  Education on brace locking/unlocking - pt can unlock to 40deg for ambulation but should keep brace locked in ext for all sitting/resting  Assisted heel slides  Mini squat with and without band assist x25  Standing heel/toe raises for WB and gait mechanics 2x10  Reiteration of dosage for quad work throughout the day and importance of frequent performance  Pt education regarding priorities for acute ACL rehab including knee extension, quad activation, normal gait, and effusion management  NP:  Medial/lateral weight shift x10  Shuttle level 1 2x10    Manual Therapy:    20 min  STM to hamstrings, quad, popliteus  Grade 1 patellar mobs all directions to tolerance  Assisted knee flexion    Neuromuscular Re-education:  30 min  Mtrigger biofeedback 10/10 on/off quad set over towel roll x8min  Goal 500  Includes time for set up  NMES Finnish to tolerance  Quad set over towel roll x15 reps  Quad set  in heel prop strap assist x15 reps  Ambulation in clinic with cues for heel strike, great toe push off, WBAT    Other: gait training; vaso   0; 15 min  Not today - Alter G 50% BW ambulation with cues for heel strike  Mini squat x15  SL balance   Includes ambulation to/from Alter G in clinic  Vaso cold temp mod compression x15 min      Assessment:  Pt demonstrated significant improvement in quad activation this session, able to consistently achieve goal of 500 during biofeedback training. Pt also demonstrated improvements in foot contact from heel to toe during gait with verbal cueing throughout, but still demonstrates hesitancy to fully weightbear and decreased knee flexion during swing.  Knee flexion is still significantly limited, likely due to combination of muscle guarding and stiffness. Pt felt benefit from band assist, will continue to utilize as variation to work on flexion.         Plan:  Knee extension work  NMES/biofeedback  Knee flexion mobility - manual, trial SAQ with assist as time allows  Continue band assisted knee flexion    Continue Alter G or other WB strategies for weightbearing as time allows    HEP: [id number]         Enriqueta Almonte, PT

## 2023-12-04 NOTE — PROGRESS NOTES
NPV-   History of Present Illness  23 y.o.male here for pov s/p left knee arthroscopic-assisted ACL-R with BPB autograft and partial lateral meniscectomy on 11/17/23. Patient reports they are doing well. Minimal pain. WBAT in brace with crutches. Taking asa. Started PT and doing HEP      On examination of left knee, incision is clean/dry/intact.  Sutures are intact.  No bleeding or drainage. Healing well.  Minimal swelling. Improved ROM and strength.  Neurovascularly intact.  Normal sensation to light touch.  Dorsalis pedis and posterior tibial pulses 2+ bilaterally. wiggles toes, calf soft and nontender    A/P: s/p left knee arthroscopic-assisted ACL-R with BPB autograft and partial lateral meniscectomy on 11/17/23.  -  Patient is doing well  - Brace and sutures removed and dressings changed  - He will continue be WBAT in his brace locked in extension  - The patient was given a prescription for physical therapy.  Physical therapy is medically necessary to improve strength, balance, range of motion and functional outcomes after injury and/or surgery.  - Continue your aspirin daily with food for blood clot prevention  - All the patient's questions were answered. The patient agrees with the above plan.  Follow up in 3 weeks

## 2023-12-06 NOTE — PROGRESS NOTES
Physical Therapy  Physical Therapy Orthopedic Evaluation    Patient Name: Diego Prasad  MRN: [id number]  Today's Date: 12/7/2023       Insurance:  Visit number: 15 of 22 (25 visits/year, has used 3 previously)  Authorization info: no auth  Insurance Type: [facility] choice+ vaso ok    General:  Reason for visit: L ACLR PBTP  Referred by: Dr. Marquez    Current Problem  1. Orthopedic aftercare        2. Knee swelling        3. Difficulty walking        4. Balance problems        5. Decreased range of motion of left knee        6. Lower extremity weakness        7. Atrophy of quadriceps femoris muscle        8. Acute pain of left knee                      Precautions: WBAT       Medical History Form: Reviewed (scanned into chart)    Subjective:   Pt reports his knee is doing alright, no major complaints, has a little discomfort from the brace. Uses norco as needed, still using NSAIDs.    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 No  Fever/chills, unexplained weight changes, dizziness/fainting, unexplained change in bowel or bladder functions, unexplained malaise or muscle weakness, night pain/sweats, numbness or tingling  Signs of DVT including: Pain, swelling or tenderness to calf, warm or red skin, previous history of DVT    Objective:  Patient presents to clinic with bilateral axillary crutches and knee brace locked in extension.    Surgical sight inspected: No signs of infection; Stitches intact and wound healing well.        ROM    Knee  "AROM (Degrees)      (R)  (L)  Flexion:   20 (assisted); able to flex to 30 over side of table with assist  Extension:   Can activate in 0deg      Knee PROM (Degrees)      (R)  (L)  Flexion:      Extension:   +8        Strength Testing  *No strength testing performed secondary to patient being post-op. Will be assessed at follow up visit.    Pt can perform quad set with inconsistent tetany.     Knee Edema Measurements:    Sweep test: +3, pitting edema extending into shin (improved)      Patella Mobility: Moderately restricted superior, inferior, medial, and lateral patella mobility noted on left secondary to effusion and pain.    Palpation: generally tender to light touch and palpation over anterior knee      Outcome Measures:        EDUCATION: home exercise program, plan of care, activity modifications, pain management, and injury pathology       Plan of care was developed with input and agreement by the patient      Treatment Performed:    Therapeutic Exercise:    37 min  Shuttle DL level 3 for knee flexion and quad activation  TKE green band 10\" holds x20  Long lever PB bridge 2x8  Pt education regarding priorities for acute ACL rehab including knee extension, quad activation, normal gait, and effusion management  NP:  Standing heel/toe raises for WB and gait mechanics 2x10  Reiteration of dosage for quad work throughout the day and importance of frequent performance  Medial/lateral weight shift x10  Mini squat with and without band assist x25  Education on brace locking/unlocking - pt can unlock to 40deg for ambulation but should keep brace locked in ext for all sitting/resting    Manual Therapy:    25 min  STM to hamstrings, quad, popliteus  Grade 1 patellar mobs all directions to tolerance  Assisted knee flexion  Manual hip flexor stretch Charanjit test position with PNF holds    Neuromuscular Re-education:  35 min  Mtrigger biofeedback 10/10 on/off   Quad set over towel roll x5 min  Quad set in heel prop x5 " min  Goal 500  Includes time for set up  NMES Maldivian to tolerance  Quad set in heel prop strap assist 2x15 reps with 1min rest  Ambulation in clinic with cues for heel strike, great toe push off, WBAT    Other: gait training; vaso   0; 15 min  Not today - Alter G 50% BW ambulation with cues for heel strike  Mini squat x15  SL balance   Includes ambulation to/from Alter G in clinic  Vaso cold temp mod compression x15 min      Assessment:  Patient presented with increased limitation in extension at start of session, still able to attain neutral but lacking full hyperextension. Extension ROM improved by end of session, pt education provided re: propping at foot/ankle for extension positioning at home. Patient continues to significantly struggle with knee flexion due to discomfort and muscle guarding, in both open chain and closed chain.      Plan:  Knee extension work  NMES/biofeedback    Squats, TKE, weight shifting variations as time allows    Continue Alter G or other WB strategies for weightbearing as time allows    HEP: [id number]         Enriqueta Almonte, PT

## 2023-12-07 ENCOUNTER — TREATMENT (OUTPATIENT)
Dept: PHYSICAL THERAPY | Facility: HOSPITAL | Age: 23
End: 2023-12-07
Payer: COMMERCIAL

## 2023-12-07 DIAGNOSIS — Z47.89 ORTHOPEDIC AFTERCARE: Primary | ICD-10-CM

## 2023-12-07 DIAGNOSIS — M25.469 KNEE SWELLING: ICD-10-CM

## 2023-12-07 DIAGNOSIS — M25.562 ACUTE PAIN OF LEFT KNEE: ICD-10-CM

## 2023-12-07 DIAGNOSIS — M25.662 DECREASED RANGE OF MOTION OF LEFT KNEE: ICD-10-CM

## 2023-12-07 DIAGNOSIS — M62.559 ATROPHY OF QUADRICEPS FEMORIS MUSCLE: ICD-10-CM

## 2023-12-07 DIAGNOSIS — R26.2 DIFFICULTY WALKING: ICD-10-CM

## 2023-12-07 DIAGNOSIS — R26.89 BALANCE PROBLEMS: ICD-10-CM

## 2023-12-07 DIAGNOSIS — R29.898 LOWER EXTREMITY WEAKNESS: ICD-10-CM

## 2023-12-07 PROCEDURE — 97140 MANUAL THERAPY 1/> REGIONS: CPT | Mod: GP

## 2023-12-07 PROCEDURE — 97112 NEUROMUSCULAR REEDUCATION: CPT | Mod: GP

## 2023-12-07 PROCEDURE — 97110 THERAPEUTIC EXERCISES: CPT | Mod: GP

## 2023-12-07 PROCEDURE — 97016 VASOPNEUMATIC DEVICE THERAPY: CPT | Mod: GP

## 2023-12-07 ASSESSMENT — PAIN - FUNCTIONAL ASSESSMENT: PAIN_FUNCTIONAL_ASSESSMENT: 0-10

## 2023-12-07 ASSESSMENT — PAIN SCALES - GENERAL: PAINLEVEL_OUTOF10: 2

## 2023-12-11 ENCOUNTER — TREATMENT (OUTPATIENT)
Dept: PHYSICAL THERAPY | Facility: HOSPITAL | Age: 23
End: 2023-12-11
Payer: COMMERCIAL

## 2023-12-11 DIAGNOSIS — M62.559 ATROPHY OF QUADRICEPS FEMORIS MUSCLE: ICD-10-CM

## 2023-12-11 DIAGNOSIS — M25.562 ACUTE PAIN OF LEFT KNEE: ICD-10-CM

## 2023-12-11 DIAGNOSIS — R29.898 LOWER EXTREMITY WEAKNESS: ICD-10-CM

## 2023-12-11 DIAGNOSIS — Z47.89 ORTHOPEDIC AFTERCARE: Primary | ICD-10-CM

## 2023-12-11 DIAGNOSIS — R26.2 DIFFICULTY WALKING: ICD-10-CM

## 2023-12-11 DIAGNOSIS — M25.469 KNEE SWELLING: ICD-10-CM

## 2023-12-11 DIAGNOSIS — R26.89 BALANCE PROBLEMS: ICD-10-CM

## 2023-12-11 DIAGNOSIS — M25.662 DECREASED RANGE OF MOTION OF LEFT KNEE: ICD-10-CM

## 2023-12-11 PROCEDURE — 97110 THERAPEUTIC EXERCISES: CPT | Mod: GP

## 2023-12-11 PROCEDURE — 97016 VASOPNEUMATIC DEVICE THERAPY: CPT | Mod: GP

## 2023-12-11 PROCEDURE — 97140 MANUAL THERAPY 1/> REGIONS: CPT | Mod: GP

## 2023-12-11 PROCEDURE — 97112 NEUROMUSCULAR REEDUCATION: CPT | Mod: GP

## 2023-12-11 ASSESSMENT — PAIN - FUNCTIONAL ASSESSMENT: PAIN_FUNCTIONAL_ASSESSMENT: 0-10

## 2023-12-11 ASSESSMENT — PAIN SCALES - GENERAL: PAINLEVEL_OUTOF10: 1

## 2023-12-11 NOTE — PROGRESS NOTES
Physical Therapy  Physical Therapy Orthopedic Evaluation    Patient Name: Diego Prasad  MRN: [id number]  Today's Date: 12/11/2023       Insurance:  Visit number: 16 of 22 (25 visits/year, has used 3 previously)  Authorization info: no auth  Insurance Type: [facility] choice+ vaso ok    General:  Reason for visit: L ACLR PBTP  Referred by: Dr. Marquez    Current Problem  1. Orthopedic aftercare        2. Knee swelling        3. Difficulty walking        4. Balance problems        5. Decreased range of motion of left knee        6. Lower extremity weakness        7. Atrophy of quadriceps femoris muscle        8. Acute pain of left knee                        Precautions: WBAT       Medical History Form: Reviewed (scanned into chart)    Subjective:   Pt reports knee is doing alright, only takes prescription pain meds on PT days. Notes less discomfort overall in the leg, but does still have some altered sensation on lateral side of knee.    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 No  Fever/chills, unexplained weight changes, dizziness/fainting, unexplained change in bowel or bladder functions, unexplained malaise or muscle weakness, night pain/sweats, numbness or tingling  Signs of DVT including: Pain, swelling or tenderness to calf, warm or red skin, previous history of DVT    Objective:  Patient presents to clinic with bilateral axillary crutches and knee brace locked in extension.    Surgical sight inspected: No signs of infection;  "Stitches intact and wound healing well.        ROM    Knee AROM (Degrees)      (R)  (L)  Flexion:   20 (assisted); able to flex to 30 over side of table with assist  Extension:   Can activate in 0deg      Knee PROM (Degrees)      (R)  (L)  Flexion:      Extension:   +8        Strength Testing  *No strength testing performed secondary to patient being post-op. Will be assessed at follow up visit.    Pt can perform quad set with inconsistent tetany.     Knee Edema Measurements:    Sweep test: +3, pitting edema extending into shin (improved)      Patella Mobility: Moderately restricted superior, inferior, medial, and lateral patella mobility noted on left secondary to effusion and pain.    Palpation: generally tender to light touch and palpation over anterior knee      Outcome Measures:        EDUCATION: home exercise program, plan of care, activity modifications, pain management, and injury pathology       Plan of care was developed with input and agreement by the patient      Treatment Performed:    Therapeutic Exercise:    17 min  Assisted SLR 2x10  TKE green band 10\" holds x20  Pt education regarding priorities for acute ACL rehab including knee extension, quad activation, normal gait, and effusion management  NP:  Shuttle DL level 3 for knee flexion and quad activation  Long lever PB bridge 2x8  Standing heel/toe raises for WB and gait mechanics 2x10  Reiteration of dosage for quad work throughout the day and importance of frequent performance  Medial/lateral weight shift x10  Mini squat with and without band assist x25  Education on brace locking/unlocking - pt can unlock to 40deg for ambulation but should keep brace locked in ext for all sitting/resting    Manual Therapy:    30 min  STM to hamstrings, quad, popliteus  Grade 3-4 patellar mobs all directions to tolerance  Assisted knee flexion over side of table and in long sitting  Manual hip flexor stretch Charanjit test position with PNF holds " (NP)    Neuromuscular Re-education:  15 min  Mtrigger biofeedback 10/10 on/off (NP)  Quad set over towel roll x5 min  Quad set in heel prop x5 min  Goal 500  Includes time for set up  NMES Hong Konger to tolerance  Quad set in heel prop x15  Quad set over towel roll x15  Ambulation in clinic with cues for heel strike, great toe push off, WBAT    Other: gait training; vaso   0; 15 min  Not today - Alter G 50% BW ambulation with cues for heel strike  Mini squat x15  SL balance   Includes ambulation to/from Alter G in clinic  Vaso cold temp mod compression x15 min      Assessment:  Pt unable to make significant improvements in knee flexion throughout session, but improvement in passive extension noted after STM. Improved weightbearing and heel/toe gait pattern noted this session. Pt's motion limited mainly by muscular guarding, pt to trial longer duration holds for flexion work at home to reduce muscle guarding.       Plan:  Knee extension work   NMES/biofeedback  Antigravity quad strength  Squats, TKE, weight shifting variations as time allows    Continue Alter G or other WB strategies for weightbearing as time allows    HEP: [id number]         Enriqueta Almonte, PT

## 2023-12-13 NOTE — PROGRESS NOTES
Physical Therapy  Physical Therapy Orthopedic Progress Note    Patient Name: Diego Prasad  MRN: [id number]  Today's Date: 12/14/2023       Insurance:  Visit number: 17 of 22 (25 visits/year, has used 3 previously)  Authorization info: no auth  Insurance Type: [facility] choice+ vaso ok    General:  Reason for visit: L ACLR PBTP  Referred by: Dr. Marquez    Current Problem  1. Orthopedic aftercare        2. Knee swelling        3. Difficulty walking        4. Balance problems        5. Decreased range of motion of left knee        6. Lower extremity weakness        7. Atrophy of quadriceps femoris muscle        8. Acute pain of left knee                          Precautions: WBAT       Medical History Form: Reviewed (scanned into chart)    Subjective:   Pt reports his knee is doing well, has not taken the prescription pain meds prior to therapy today.     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 No  Fever/chills, unexplained weight changes, dizziness/fainting, unexplained change in bowel or bladder functions, unexplained malaise or muscle weakness, night pain/sweats, numbness or tingling  Signs of DVT including: Pain, swelling or tenderness to calf, warm or red skin, previous history of DVT    Objective:  Patient presents to clinic with bilateral axillary crutches and knee brace locked in extension.    Surgical sight inspected: No signs of infection; Stitches intact and wound healing well.        ROM    Knee AROM  "(Degrees)      (R)  (L)  Flexion:   20 (assisted); able to flex to 30 over side of table with assist  Extension:   Can activate in 0deg    End of session flexion 25deg (assisted)    Knee PROM (Degrees)      (R)  (L)  Flexion:      Extension:   +8        Strength Testing  *No strength testing performed secondary to patient being post-op. Will be assessed at follow up visit.    Pt can perform quad set with inconsistent tetany.     Knee Edema Measurements:    Sweep test: +3, pitting edema extending into shin (improved)      Patella Mobility: Moderately restricted superior, inferior, medial, and lateral patella mobility noted on left secondary to effusion and pain.    Palpation: less anterior knee tenderness, increased muscle tension throughout quads, hamstrings      Outcome Measures:        EDUCATION: home exercise program, plan of care, activity modifications, pain management, and injury pathology       Plan of care was developed with input and agreement by the patient      Treatment Performed:    Therapeutic Exercise:    20 min  Recumbent bike x5 min rocking for knee mobility with assist  Assisted heel slides x10  Mini squat x15  Pt education on prioritization of extension at home and importance of continuing to work on flexion in PT to prevent further stiffness  Pt education regarding priorities for acute ACL rehab including knee extension, quad activation, normal gait, and effusion management  NP:  TKE green band 10\" holds x20  Shuttle DL level 3 for knee flexion and quad activation  Long lever PB bridge 2x8  Standing heel/toe raises for WB and gait mechanics 2x10  Reiteration of dosage for quad work throughout the day and importance of frequent performance  Medial/lateral weight shift x10  Education on brace locking/unlocking - pt can unlock to 40deg for ambulation but should keep brace locked in ext for all sitting/resting    Manual Therapy:    42 min  STM to hamstrings, quad, popliteus  Grade 3-4 patellar mobs " all directions to tolerance  Assisted knee flexion over side of table, prone with PNF holds  Cupping to quads with and without assisted flexion    Neuromuscular Re-education:  5 min  Mtrigger biofeedback 10/10 on/off (NP)  Quad set over towel roll x5 min  Quad set in heel prop x5 min  Goal 500  Includes time for set up  NMES Indonesian to tolerance  Quad set in heel prop x5 min  Ambulation in clinic with cues for heel strike, great toe push off, WBAT    Other: gait training; vaso   0; 15 min  Not today - Alter G 50% BW ambulation with cues for heel strike  Mini squat x15  SL balance   Includes ambulation to/from Alter G in clinic  Vaso cold temp mod compression x15 min      Assessment:  Pt able to gain 5 additional degrees of knee flexion above highest prior measurement, but with significant quad guarding throughout. Able to achieve 32 deg of knee flexion during squatting. Pt able to get best flexion ROM with manual assistance in prone. Pt verbalized understanding of prioritization of extension until next PT appointment to allow more in-session time to continue to work on knee flexion.    Plan:  Check extension, flexion work if extension ROM doing well (manual, prone stretching, squat variations)  If extension work needed, restore extension and quad strength focus  NMES/biofeedback as time allows     Continue Alter G or other WB strategies for weightbearing as time allows    HEP: [id number]         Enriqueta Almonte, PT

## 2023-12-14 ENCOUNTER — TREATMENT (OUTPATIENT)
Dept: PHYSICAL THERAPY | Facility: HOSPITAL | Age: 23
End: 2023-12-14
Payer: COMMERCIAL

## 2023-12-14 DIAGNOSIS — M25.562 ACUTE PAIN OF LEFT KNEE: ICD-10-CM

## 2023-12-14 DIAGNOSIS — R26.89 BALANCE PROBLEMS: ICD-10-CM

## 2023-12-14 DIAGNOSIS — M62.559 ATROPHY OF QUADRICEPS FEMORIS MUSCLE: ICD-10-CM

## 2023-12-14 DIAGNOSIS — R26.2 DIFFICULTY WALKING: ICD-10-CM

## 2023-12-14 DIAGNOSIS — M25.469 KNEE SWELLING: ICD-10-CM

## 2023-12-14 DIAGNOSIS — Z47.89 ORTHOPEDIC AFTERCARE: Primary | ICD-10-CM

## 2023-12-14 DIAGNOSIS — R29.898 LOWER EXTREMITY WEAKNESS: ICD-10-CM

## 2023-12-14 DIAGNOSIS — M25.662 DECREASED RANGE OF MOTION OF LEFT KNEE: ICD-10-CM

## 2023-12-14 PROCEDURE — 97016 VASOPNEUMATIC DEVICE THERAPY: CPT | Mod: GP

## 2023-12-14 PROCEDURE — 97110 THERAPEUTIC EXERCISES: CPT | Mod: GP

## 2023-12-14 PROCEDURE — 97140 MANUAL THERAPY 1/> REGIONS: CPT | Mod: GP

## 2023-12-14 ASSESSMENT — PAIN - FUNCTIONAL ASSESSMENT: PAIN_FUNCTIONAL_ASSESSMENT: 0-10

## 2023-12-14 ASSESSMENT — PAIN SCALES - GENERAL: PAINLEVEL_OUTOF10: 1

## 2023-12-18 ENCOUNTER — TREATMENT (OUTPATIENT)
Dept: PHYSICAL THERAPY | Facility: HOSPITAL | Age: 23
End: 2023-12-18
Payer: COMMERCIAL

## 2023-12-18 DIAGNOSIS — M25.469 KNEE SWELLING: ICD-10-CM

## 2023-12-18 DIAGNOSIS — M62.559 ATROPHY OF QUADRICEPS FEMORIS MUSCLE: ICD-10-CM

## 2023-12-18 DIAGNOSIS — R26.2 DIFFICULTY WALKING: ICD-10-CM

## 2023-12-18 DIAGNOSIS — R26.89 BALANCE PROBLEMS: ICD-10-CM

## 2023-12-18 DIAGNOSIS — M25.662 DECREASED RANGE OF MOTION OF LEFT KNEE: ICD-10-CM

## 2023-12-18 DIAGNOSIS — M25.562 ACUTE PAIN OF LEFT KNEE: ICD-10-CM

## 2023-12-18 DIAGNOSIS — R29.898 LOWER EXTREMITY WEAKNESS: ICD-10-CM

## 2023-12-18 DIAGNOSIS — Z47.89 ORTHOPEDIC AFTERCARE: Primary | ICD-10-CM

## 2023-12-18 PROCEDURE — 97110 THERAPEUTIC EXERCISES: CPT | Mod: GP

## 2023-12-18 PROCEDURE — 97016 VASOPNEUMATIC DEVICE THERAPY: CPT | Mod: GP

## 2023-12-18 PROCEDURE — 97112 NEUROMUSCULAR REEDUCATION: CPT | Mod: GP

## 2023-12-18 PROCEDURE — 97140 MANUAL THERAPY 1/> REGIONS: CPT | Mod: GP

## 2023-12-18 NOTE — PROGRESS NOTES
Physical Therapy  Physical Therapy Orthopedic Progress Note    Patient Name: Diego Prasad  MRN: [id number]  Today's Date: 12/18/2023  Time Calculation  Start Time: 1010  Stop Time: 1147  Time Calculation (min): 97 min    Insurance:  Visit number: 18 of 22 (25 visits/year, has used 3 previously)  Authorization info: no auth  Insurance Type: [facility] choice+ vaso ok    General:  Reason for visit: L ACLR PBTP  Referred by: Dr. Marquez    Current Problem  1. Orthopedic aftercare        2. Knee swelling        3. Difficulty walking        4. Balance problems        5. Decreased range of motion of left knee        6. Lower extremity weakness        7. Atrophy of quadriceps femoris muscle        8. Acute pain of left knee                            Precautions: WBAT       Medical History Form: Reviewed (scanned into chart)    Subjective:   Pt reports knee is doing okay, has been able to continue not using prescription pain meds prior to therapy. Reports he has been working a lot on knee mobility at home.    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 No  Fever/chills, unexplained weight changes, dizziness/fainting, unexplained change in bowel or bladder functions, unexplained malaise or muscle weakness, night pain/sweats, numbness or tingling  Signs of DVT including: Pain, swelling or tenderness to calf, warm or red skin, previous history of DVT    Objective:  Patient presents to clinic with bilateral axillary crutches and knee brace locked in  "extension.    Surgical sight inspected: No signs of infection; Stitches intact and wound healing well.        ROM    Knee AROM (Degrees)      (R)  (L)  Flexion:   20 (assisted); post session 30  Extension:   Can activate in 0deg    End of session flexion 25deg (assisted)    Knee PROM (Degrees)      (R)  (L)  Flexion:      Extension:   +8        Strength Testing  *No strength testing performed secondary to patient being post-op. Will be assessed at follow up visit.    Pt can perform quad set with inconsistent tetany.     Knee Edema Measurements:    Sweep test: +3, pitting edema extending into shin (improved)      Patella Mobility: Moderately restricted superior, inferior, medial, and lateral patella mobility noted on left secondary to effusion and pain.    Palpation: less anterior knee tenderness, increased muscle tension throughout quads, hamstrings      Outcome Measures:        EDUCATION: home exercise program, plan of care, activity modifications, pain management, and injury pathology       Plan of care was developed with input and agreement by the patient      Treatment Performed:    Therapeutic Exercise:    32 min  Recumbent bike x5 min rocking for knee mobility with assist (NP)  Assisted heel slide hold 3x60\"  Mini squat x20  Assisted SLR 3x5  Pt education on prioritization of extension at home and importance of continuing to work on  extension/flexion to prevent further stiffness  Pt education regarding priorities for acute ACL rehab including knee extension, quad activation, normal gait, and effusion management  NP:  TKE green band 10\" holds x20  Shuttle DL level 3 for knee flexion and quad activation  Long lever PB bridge 2x8  Standing heel/toe raises for WB and gait mechanics 2x10  Reiteration of dosage for quad work throughout the day and importance of frequent performance  Medial/lateral weight shift x10  Education on brace locking/unlocking - pt can unlock to 40deg for ambulation but should keep brace " locked in ext for all sitting/resting    Manual Therapy:    30 min  STM to hamstrings, quad, popliteus  Grade 3-4 patellar mobs all directions to tolerance  Assisted knee flexion in prone with active hamstring, quad contraction  Cupping to quads with and without assisted flexion (NP)    Neuromuscular Re-education:  20 min  Mtrigger biofeedback 10/10 on/off (NP)  Quad set over towel roll x5 min  Quad set in heel prop x5 min  Goal 500  Includes time for set up  NMES Palestinian to tolerance  Quad set in heel prop 2x15 min  Ambulation in clinic with cues for heel strike, great toe push off, WBAT    Other: gait training; vaso   0; 15 min  Not today - Alter G 50% BW ambulation with cues for heel strike  Mini squat x15  SL balance   Includes ambulation to/from Alter G in clinic  Vaso cold temp mod compression x15 min      Assessment:  Pt continues to demonstrate significant guarding of knee flexion mobility and anterior knee discomfort during flexion. Improved pre-session flexion with ability to gain an additional 5 deg during session today, but overall flexion is extremely limited. Improved weightbearing and pre-session extension range noted this session.    Plan:  Continue flexion, extension mobility work   End range quad strength (NMES, biofeedback, from minimal flexion angles as able)  Consider gait training in Alter G for functional flexion, WB    HEP: [id number]         Enriqueta Almonte, PT

## 2023-12-20 ASSESSMENT — PAIN SCALES - GENERAL: PAINLEVEL_OUTOF10: 1

## 2023-12-20 ASSESSMENT — PAIN - FUNCTIONAL ASSESSMENT: PAIN_FUNCTIONAL_ASSESSMENT: 0-10

## 2023-12-21 ENCOUNTER — TREATMENT (OUTPATIENT)
Dept: PHYSICAL THERAPY | Facility: HOSPITAL | Age: 23
End: 2023-12-21
Payer: COMMERCIAL

## 2023-12-21 DIAGNOSIS — R26.2 DIFFICULTY WALKING: ICD-10-CM

## 2023-12-21 DIAGNOSIS — R26.89 BALANCE PROBLEMS: ICD-10-CM

## 2023-12-21 DIAGNOSIS — Z47.89 ORTHOPEDIC AFTERCARE: Primary | ICD-10-CM

## 2023-12-21 DIAGNOSIS — M62.559 ATROPHY OF QUADRICEPS FEMORIS MUSCLE: ICD-10-CM

## 2023-12-21 DIAGNOSIS — R29.898 LOWER EXTREMITY WEAKNESS: ICD-10-CM

## 2023-12-21 DIAGNOSIS — M25.662 DECREASED RANGE OF MOTION OF LEFT KNEE: ICD-10-CM

## 2023-12-21 DIAGNOSIS — M25.469 KNEE SWELLING: ICD-10-CM

## 2023-12-21 DIAGNOSIS — M25.562 ACUTE PAIN OF LEFT KNEE: ICD-10-CM

## 2023-12-21 PROCEDURE — 97016 VASOPNEUMATIC DEVICE THERAPY: CPT | Mod: GP

## 2023-12-21 PROCEDURE — 97112 NEUROMUSCULAR REEDUCATION: CPT | Mod: GP

## 2023-12-21 PROCEDURE — 97140 MANUAL THERAPY 1/> REGIONS: CPT | Mod: GP

## 2023-12-21 PROCEDURE — 97110 THERAPEUTIC EXERCISES: CPT | Mod: GP

## 2023-12-21 ASSESSMENT — PAIN SCALES - GENERAL: PAINLEVEL_OUTOF10: 1

## 2023-12-21 ASSESSMENT — PAIN - FUNCTIONAL ASSESSMENT: PAIN_FUNCTIONAL_ASSESSMENT: 0-10

## 2023-12-21 NOTE — PROGRESS NOTES
Physical Therapy  Physical Therapy Orthopedic Progress Note    Patient Name: Diego Prasad  MRN: [id number]  Today's Date: 12/21/2023       Insurance:  Visit number: 19 of 22 (25 visits/year, has used 3 previously)  Authorization info: no auth  Insurance Type: [facility] choice+ vaso ok    General:  Reason for visit: L ACLR PBTP  Referred by: Dr. Marquez    Current Problem  1. Orthopedic aftercare        2. Knee swelling        3. Difficulty walking        4. Balance problems        5. Decreased range of motion of left knee        6. Lower extremity weakness        7. Atrophy of quadriceps femoris muscle        8. Acute pain of left knee                              Precautions: WBAT       Medical History Form: Reviewed (scanned into chart)    Subjective:   Knee feels fine today, has been working on bending knee over the side of the bed. Took one prescription pain pill today pre-session, also occasionally uses tylenol.    DOS: 11/17/23    Pain:  Pain Assessment: 0-10  Pain Score: 1  Location: L knee  Description: dull      Objective:  Patient presents to clinic with bilateral axillary crutches and knee brace locked in extension.    Surgical sight inspected: No signs of infection; Stitches intact and wound healing well.        ROM    Knee AROM (Degrees)      (R)  (L)  Flexion:   20 (assisted); post session 30  Extension:   Can activate in 0deg    End of session flexion 25deg (assisted)    Knee PROM (Degrees)      (R)  (L)  Flexion:      Extension:   +8        Strength Testing  *No strength testing performed secondary to patient being post-op. Will be assessed at follow up visit.    Pt can perform quad set with inconsistent tetany.     Knee Edema Measurements:    Sweep test: +3, pitting edema extending into shin (improved)      Patella Mobility: Moderately restricted superior, inferior, medial, and lateral patella mobility noted on left secondary to effusion and pain.    Palpation: less anterior knee tenderness,  "increased muscle tension throughout quads, hamstrings      Outcome Measures:        EDUCATION: home exercise program, plan of care, activity modifications, pain management, and injury pathology       Plan of care was developed with input and agreement by the patient      Treatment Performed:    Therapeutic Exercise:    29 min  Assisted heel slides x20  Mini squat x30 with/without band assist  Pt education on mobility exercise priorities to perform until next week (extension prop, quad sets, heel slides or squats)  NP:  Assisted SLR 3x5  TKE green band 10\" holds x20  Shuttle DL level 3 for knee flexion and quad activation  Long lever PB bridge 2x8  Standing heel/toe raises for WB and gait mechanics 2x10  Reiteration of dosage for quad work throughout the day and importance of frequent performance  Medial/lateral weight shift x10  Education on brace locking/unlocking - pt can unlock to 40deg for ambulation but should keep brace locked in ext for all sitting/resting    Manual Therapy:    35 min  STM to hamstrings, quad, popliteus  Grade 3-4 patellar mobs all directions to tolerance  Assisted knee flexion in prone with active hamstring, quad contraction  Extension overpressure    Neuromuscular Re-education:  25 min  Mtrigger biofeedback 10/10 on/off   Quad set in heel prop x10 min goal 400  Includes time for set up  NMES Tongan to tolerance  SAQ assisted over foam roll x15 reps 10/20  Quad set in heel prop x15 reps 10/10    Other: gait training; vaso   0; 15 min  Not today - Alter G 50% BW ambulation with cues for heel strike  Mini squat x15  SL balance   Includes ambulation to/from Alter G in clinic  Vaso cold temp mod compression x15 min      Assessment:  Pt still limited in flexion, but able to demonstrate best ROM in closed chain during squatting with band assist (35deg); still mainly limited due to guarding. Minimal deficit in extension noted with overpressure, but this also may be due to guarding. Able to achieve " higher biofeedback goals and able to activate quad against gravity to greater extent this session, but still demonstrates overall weakness. Pt education on continued priorities of mobility work provided.      Plan:  Heel slides, band assisted squats; extension mobility work  Gait training in alter G  End range quad strength (NMES, biofeedback, from minimal flexion angles as able)    HEP: [id number]         Enriqueta Almonte, PT

## 2023-12-27 NOTE — PROGRESS NOTES
Physical Therapy  Physical Therapy Orthopedic Progress Note    Patient Name: Diego Prasad  MRN: [id number]  Today's Date: 12/28/2023       Insurance:  Visit number: 20 of 22 (25 visits/year, has used 3 previously)  Authorization info: no auth  Insurance Type: [facility] choice+ vaso ok    General:  Reason for visit: L ACLR PBTP  Referred by: Dr. Marquez    Current Problem  1. Orthopedic aftercare        2. Knee swelling        3. Difficulty walking        4. Balance problems        5. Decreased range of motion of left knee        6. Lower extremity weakness        7. Atrophy of quadriceps femoris muscle        8. Acute pain of left knee        9. Effusion of left knee                                Precautions: WBAT       Medical History Form: Reviewed (scanned into chart)    Subjective:   Pt reports knee is feeling okay today, has been working on flexion a lot and feels it is getting better. Has also started to work on the short arc quads at home.    DOS: 11/17/23    Pain:  Pain Assessment: 0-10  Pain Score: 0 - No pain  Location: L knee  Description: dull      Objective:  Patient presents to clinic with bilateral axillary crutches and hinged knee brace.    Surgical sight inspected: No signs of infection; Stitches intact and wound healing well.        ROM    Knee AROM (Degrees)      (R)  (L)  Flexion:   Pre session 30deg  Extension:   Can activate in +8deg, cannot do without prop      Knee PROM (Degrees)      (R)  (L)  Flexion:      Extension:   +8        Strength Testing  *No strength testing performed secondary to patient being post-op. Will be assessed at follow up visit.    Pt can perform quad set with inconsistent tetany.     Knee Edema Measurements:    Sweep test: +1      Patella Mobility: Moderately restricted superior, inferior, medial, and lateral patella mobility noted on left secondary to effusion and pain.    Palpation: less anterior knee tenderness, increased muscle tension throughout quads,  "hamstrings      Outcome Measures:        EDUCATION: home exercise program, plan of care, activity modifications, pain management, and injury pathology       Plan of care was developed with input and agreement by the patient      Treatment Performed:    Therapeutic Exercise:    28 min  Assisted heel slides x15  Quad set in heel prop  Mini squat x15 UE assist  TKE red band 5\" holds x20  Pt education on mobility exercise priorities to perform until next week (extension prop, quad sets, heel slides or squats)  NP:  Assisted SLR 3x5  Shuttle DL level 3 for knee flexion and quad activation  Long lever PB bridge 2x8  Standing heel/toe raises for WB and gait mechanics 2x10  Reiteration of dosage for quad work throughout the day and importance of frequent performance  Medial/lateral weight shift x10  Education on brace locking/unlocking - pt can unlock to 40deg for ambulation but should keep brace locked in ext for all sitting/resting    Manual Therapy:    20 min  STM to hamstrings, lateral quad, popliteus  Grade 3-4 patellar mobs all directions to tolerance  Not today:  Assisted knee flexion in prone with active hamstring, quad contraction  Extension overpressure    Neuromuscular Re-education:  22 min  Mtrigger biofeedback 10/10 on/off   Quad set over towel roll x5 min goal 800  Quad set in heel prop  Includes time for set up  NMES Luxembour[place] to tolerance  SAQ assisted over foam roll x15 reps 10/20  Quad set in heel prop x15 reps 10/10  Ambulation in clinic with single crutch with cues for assistive device use, knee flexion, and WB    Other: gait training; vaso   0; 10 min  Not today - Alter G 50% BW ambulation with cues for heel strike  Mini squat x15  SL balance   Includes ambulation to/from Alter G in clinic  Vaso cold temp mod compression x15 min      Assessment:  Pt presented with improved presession flexion and was able to maintain it with minimal effort following extension work. However, patient is still extremely " limited in knee flexion and has a little difficulty maintaining full knee hyperextension. He has demonstrated improvements in weightbearing but has not yet demonstrated sufficient mobility and quad control to allow full weightbearing or ambulation without crutches. While the patient has been able to make incremental gains, he still demonstrates deficits in mobility, strength, and ambulation compared to a typical post op course.      Plan:  Heel slides, band assisted squats; extension mobility work and quad strength in full extension  Future visits: gait training in alter G, shuttle, squatting, LAQ variations    HEP: [id number]         Enriqueta Almonte, PT

## 2023-12-28 ENCOUNTER — TREATMENT (OUTPATIENT)
Dept: PHYSICAL THERAPY | Facility: HOSPITAL | Age: 23
End: 2023-12-28
Payer: COMMERCIAL

## 2023-12-28 DIAGNOSIS — M62.559 ATROPHY OF QUADRICEPS FEMORIS MUSCLE: ICD-10-CM

## 2023-12-28 DIAGNOSIS — R26.89 BALANCE PROBLEMS: ICD-10-CM

## 2023-12-28 DIAGNOSIS — M25.462 EFFUSION OF LEFT KNEE: ICD-10-CM

## 2023-12-28 DIAGNOSIS — M25.562 ACUTE PAIN OF LEFT KNEE: ICD-10-CM

## 2023-12-28 DIAGNOSIS — R26.2 DIFFICULTY WALKING: ICD-10-CM

## 2023-12-28 DIAGNOSIS — M25.469 KNEE SWELLING: ICD-10-CM

## 2023-12-28 DIAGNOSIS — M25.662 DECREASED RANGE OF MOTION OF LEFT KNEE: ICD-10-CM

## 2023-12-28 DIAGNOSIS — R29.898 LOWER EXTREMITY WEAKNESS: ICD-10-CM

## 2023-12-28 DIAGNOSIS — Z47.89 ORTHOPEDIC AFTERCARE: Primary | ICD-10-CM

## 2023-12-28 PROCEDURE — 97112 NEUROMUSCULAR REEDUCATION: CPT | Mod: GP

## 2023-12-28 PROCEDURE — 97016 VASOPNEUMATIC DEVICE THERAPY: CPT | Mod: GP

## 2023-12-28 PROCEDURE — 97110 THERAPEUTIC EXERCISES: CPT | Mod: GP

## 2023-12-28 PROCEDURE — 97140 MANUAL THERAPY 1/> REGIONS: CPT | Mod: GP

## 2023-12-28 ASSESSMENT — PAIN SCALES - GENERAL: PAINLEVEL_OUTOF10: 0 - NO PAIN

## 2023-12-28 ASSESSMENT — PAIN - FUNCTIONAL ASSESSMENT: PAIN_FUNCTIONAL_ASSESSMENT: 0-10

## 2023-12-29 ENCOUNTER — OFFICE VISIT (OUTPATIENT)
Dept: ORTHOPEDIC SURGERY | Facility: CLINIC | Age: 23
End: 2023-12-29
Payer: COMMERCIAL

## 2023-12-29 DIAGNOSIS — S83.512A RUPTURE OF ANTERIOR CRUCIATE LIGAMENT OF LEFT KNEE, INITIAL ENCOUNTER: Primary | ICD-10-CM

## 2023-12-29 DIAGNOSIS — M24.662 ARTHROFIBROSIS OF KNEE JOINT, LEFT: ICD-10-CM

## 2023-12-29 PROCEDURE — 1036F TOBACCO NON-USER: CPT | Performed by: ORTHOPAEDIC SURGERY

## 2023-12-29 PROCEDURE — 99024 POSTOP FOLLOW-UP VISIT: CPT | Performed by: ORTHOPAEDIC SURGERY

## 2023-12-29 ASSESSMENT — PAIN SCALES - GENERAL: PAINLEVEL_OUTOF10: 2

## 2023-12-29 NOTE — PROGRESS NOTES
Patient comes in for pov on his left knee.  His physical therapist sent me an email stating that he had a lot of mental apprehension with respect to bending his knee and so he has not been doing those exercises.  He has been able to get full extension.  The physical therapist noted only 30 degrees of knee flexion during the last visit with him.  Discussed with the patient what his apprehension or fears are and did the exercises with him to work on knee flexion in the office today and he felt much better and he was actually able to do the exercises himself by the end of the visit.    Physical Exam:  Left knee  : incisions healed, able to get full extension of knee, when first started in office visit he was only getting to 30 degrees of knee flexion, as we worked through the patellar mobility and knee flexion-extension exercises with him in the office today his confidence improved and he was able to get close to 55 degrees of flexion, patellar mobility not bad, neg lachman, no pain, calf soft and supple, toes pink and warm with good capillary refill, pulses intact, limb swelling appropriate, wiggles toes    Assessment: left knee s/p ACL-R with BPB autograft, partial lateral menisectomy 11/17/23  Plan:  - discussed with the patient and his mom today in the office that he has getting early arthrofibrosis of the knee.  At this point we would recommend arthroscopic lysis of adhesions.  He and his mom wanted to give it more time to see whether he can regain knee flexion since he had more confidence now that he will not do any damage or have any significant pain when he works on his knee flexion range of motion.  Discussed with him he would have to do his exercises at least 4-6 times a day.  He wanted to try improving his range of motion on his own and with physical therapy.  We compromised and we will see the patient in 1 week's time to see if he has made good progress on his knee flexion range of motion.  If he has not  been we discussed with him that we will have to do arthroscopic lysis of adhesions and manipulation.  I discussed with the patient that the longer we wait the complications of tendon rupture and others are rare increase due to the stiffness of the scar tissue which consolidates.  We discussed with him that after surgery he would have to move the knee immediately and likely have to rent a CPM machine to use initially to make sure the scar tissue does not reform and get tight.  - follow-up visit 1 week  - PT orders continue  Patient's questions were answered in detail and they are agreeable to above plan.

## 2024-01-02 ENCOUNTER — PREP FOR PROCEDURE (OUTPATIENT)
Dept: ORTHOPEDIC SURGERY | Facility: CLINIC | Age: 24
End: 2024-01-02
Payer: COMMERCIAL

## 2024-01-02 DIAGNOSIS — M24.662 ARTHROFIBROSIS OF KNEE JOINT, LEFT: Primary | ICD-10-CM

## 2024-01-03 NOTE — PROGRESS NOTES
Physical Therapy  Physical Therapy Orthopedic Progress Note    Patient Name: Diego Prasad  MRN: [id number]  Today's Date: 01/04/2024       Insurance:  Visit number: 1 of 25  Total visit count: 21  Authorization info: no auth  Insurance Type: [facility] choice+ vaso ok    General:  Reason for visit: L ACLR PBTP  Referred by: Dr. Marquez    Current Problem  1. Orthopedic aftercare        2. Knee swelling        3. Difficulty walking        4. Balance problems        5. Decreased range of motion of left knee        6. Lower extremity weakness        7. Atrophy of quadriceps femoris muscle        8. Acute pain of left knee        9. Effusion of left knee                  Precautions: WBAT       Medical History Form: Reviewed (scanned into chart)    Subjective:   Pt reports he had his follow up with Dr. Marquez, and they want to plan on doing a scope (lysis of adhesions) and manipulation under anesthesia. Has another follow up with her in a week to see if he is able to achieve 90deg of flexion, and he reports they may decide not to do the BRANDEE/YARI in if he is able to do that.     DOS: 11/17/23    Pain:  Pain Assessment: 0-10  Pain Score: 1  Location: L knee  Description: dull      Objective:  Patient presents to clinic with bilateral axillary crutches and hinged knee brace.    Surgical sight inspected: No signs of infection; Stitches intact and wound healing well.        ROM  Post session flexion: 43 deg    Knee AROM (Degrees)      (R)  (L)  Flexion:   Pre session 30deg  Extension:   Can activate in +8deg, cannot do without prop      Knee PROM (Degrees)      (R)  (L)  Flexion:      Extension:   +8        Strength Testing  *No strength testing performed secondary to patient being post-op. Will be assessed at follow up visit.    Pt can perform quad set with inconsistent tetany.     Knee Edema Measurements:    Sweep test: +1      Patella Mobility: Moderately restricted superior, inferior, medial, and lateral patella  "mobility noted on left secondary to effusion and pain.    Palpation: less anterior knee tenderness, increased muscle tension throughout quads, hamstrings      Outcome Measures:        EDUCATION: home exercise program, plan of care, activity modifications, pain management, and injury pathology       Plan of care was developed with input and agreement by the patient      Treatment Performed:    Therapeutic Exercise:    36 min  LLLD flexion over table adding 3lb ankle weight 3x60\"  Squat 2x20 green band assist  Assisted heel slides x10  HEP update and patient education on knee mobility priorities for over the weekend  NP:  TKE red band 5\" holds x20  Assisted SLR 3x5  Shuttle DL level 3 for knee flexion and quad activation  Long lever PB bridge 2x8  Standing heel/toe raises for WB and gait mechanics 2x10  Reiteration of dosage for quad work throughout the day and importance of frequent performance  Medial/lateral weight shift x10    Manual Therapy:    12 min  Assisted knee flexion in prone with premod  Assisted knee flexion over table  Grade 3-4 patellar mobs all directions to tolerance (nt)  Not today:    Extension overpressure  STM to hamstrings, lateral quad, popliteus    Neuromuscular Re-education:  10 min  Mtrigger biofeedback 10/10 on/off   Quad set over towel roll x4 min goal 400  Quad set in heel prop x4 min  Includes time for set up  NMES Maltese to tolerance  SAQ assisted over foam roll x15 reps 10/20  Quad set in heel prop x15 reps 10/10  Ambulation in clinic with single crutch with cues for assistive device use, knee flexion, and WB    Other: gait training; vaso   0; 15 min  Not today - Alter G 50% BW ambulation with cues for heel strike  Mini squat x15  SL balance   Includes ambulation to/from Alter G in clinic  Vaso medium heat temp mod compression x10 min (includes time for set up)      Assessment:  Pt able to achieve best in-session measurements today with 43 deg of knee flexion in both closed and open " chain. Felt improvement in ability to bend without guarding with use of pre-mod stimulation and noted less discomfort. Had a little difficulty with achieving full hyperextension following, but this is expected due to focus on flexion this session. Pt has made good progress over the last week, but still significantly limited in flexion with both stiffness and guarding (although decreased) at end range.      Plan:  Continue use of pre-mod stim for knee flexion mobility, squatting, assisted flexion  Continue quad strength/active extension as time allows    Future visits: gait training in dusty RAMACHANDRAN, shuttle, squatting, LAQ variations    HEP: [id number]         Enriqueta Almonte, PT

## 2024-01-04 ENCOUNTER — TREATMENT (OUTPATIENT)
Dept: PHYSICAL THERAPY | Facility: HOSPITAL | Age: 24
End: 2024-01-04
Payer: COMMERCIAL

## 2024-01-04 DIAGNOSIS — R26.2 DIFFICULTY WALKING: ICD-10-CM

## 2024-01-04 DIAGNOSIS — M25.562 ACUTE PAIN OF LEFT KNEE: ICD-10-CM

## 2024-01-04 DIAGNOSIS — M25.462 EFFUSION OF LEFT KNEE: ICD-10-CM

## 2024-01-04 DIAGNOSIS — M25.469 KNEE SWELLING: ICD-10-CM

## 2024-01-04 DIAGNOSIS — Z47.89 ORTHOPEDIC AFTERCARE: Primary | ICD-10-CM

## 2024-01-04 DIAGNOSIS — R29.898 LOWER EXTREMITY WEAKNESS: ICD-10-CM

## 2024-01-04 DIAGNOSIS — M25.662 DECREASED RANGE OF MOTION OF LEFT KNEE: ICD-10-CM

## 2024-01-04 DIAGNOSIS — R26.89 BALANCE PROBLEMS: ICD-10-CM

## 2024-01-04 DIAGNOSIS — M62.559 ATROPHY OF QUADRICEPS FEMORIS MUSCLE: ICD-10-CM

## 2024-01-04 PROCEDURE — 97112 NEUROMUSCULAR REEDUCATION: CPT | Mod: GP

## 2024-01-04 PROCEDURE — 97110 THERAPEUTIC EXERCISES: CPT | Mod: GP

## 2024-01-04 PROCEDURE — 97016 VASOPNEUMATIC DEVICE THERAPY: CPT | Mod: GP

## 2024-01-04 PROCEDURE — 97140 MANUAL THERAPY 1/> REGIONS: CPT | Mod: GP

## 2024-01-04 ASSESSMENT — PAIN SCALES - GENERAL: PAINLEVEL_OUTOF10: 1

## 2024-01-04 ASSESSMENT — PAIN - FUNCTIONAL ASSESSMENT: PAIN_FUNCTIONAL_ASSESSMENT: 0-10

## 2024-01-08 ENCOUNTER — TREATMENT (OUTPATIENT)
Dept: PHYSICAL THERAPY | Facility: HOSPITAL | Age: 24
End: 2024-01-08
Payer: COMMERCIAL

## 2024-01-08 ENCOUNTER — APPOINTMENT (OUTPATIENT)
Dept: ORTHOPEDIC SURGERY | Facility: HOSPITAL | Age: 24
End: 2024-01-08
Payer: COMMERCIAL

## 2024-01-08 ENCOUNTER — OFFICE VISIT (OUTPATIENT)
Dept: ORTHOPEDIC SURGERY | Facility: HOSPITAL | Age: 24
End: 2024-01-08
Payer: COMMERCIAL

## 2024-01-08 DIAGNOSIS — R29.898 LOWER EXTREMITY WEAKNESS: ICD-10-CM

## 2024-01-08 DIAGNOSIS — M25.469 KNEE SWELLING: ICD-10-CM

## 2024-01-08 DIAGNOSIS — M25.662 DECREASED RANGE OF MOTION OF LEFT KNEE: ICD-10-CM

## 2024-01-08 DIAGNOSIS — R26.2 DIFFICULTY WALKING: ICD-10-CM

## 2024-01-08 DIAGNOSIS — M24.662 ARTHROFIBROSIS OF KNEE JOINT, LEFT: Primary | ICD-10-CM

## 2024-01-08 DIAGNOSIS — Z47.89 ORTHOPEDIC AFTERCARE: Primary | ICD-10-CM

## 2024-01-08 DIAGNOSIS — M25.562 ACUTE PAIN OF LEFT KNEE: ICD-10-CM

## 2024-01-08 DIAGNOSIS — R26.89 BALANCE PROBLEMS: ICD-10-CM

## 2024-01-08 DIAGNOSIS — M62.559 ATROPHY OF QUADRICEPS FEMORIS MUSCLE: ICD-10-CM

## 2024-01-08 PROCEDURE — 97110 THERAPEUTIC EXERCISES: CPT | Mod: GP

## 2024-01-08 PROCEDURE — 97140 MANUAL THERAPY 1/> REGIONS: CPT | Mod: GP

## 2024-01-08 PROCEDURE — 1036F TOBACCO NON-USER: CPT | Performed by: ORTHOPAEDIC SURGERY

## 2024-01-08 PROCEDURE — 99024 POSTOP FOLLOW-UP VISIT: CPT | Performed by: ORTHOPAEDIC SURGERY

## 2024-01-08 PROCEDURE — 97016 VASOPNEUMATIC DEVICE THERAPY: CPT | Mod: GP

## 2024-01-08 ASSESSMENT — PAIN - FUNCTIONAL ASSESSMENT: PAIN_FUNCTIONAL_ASSESSMENT: 0-10

## 2024-01-08 ASSESSMENT — PAIN SCALES - GENERAL: PAINLEVEL_OUTOF10: 0 - NO PAIN

## 2024-01-08 NOTE — PROGRESS NOTES
Physical Therapy  Physical Therapy Orthopedic Progress Note    Patient Name: Diego Prasad  MRN: [id number]  Today's Date: 01/04/2024       Insurance:  Visit number: 2 of 25  Total visit count: 22  Authorization info: no auth  Insurance Type: [employer] choice+ vaso ok    General:  Reason for visit: L ACLR PBTP  Referred by: Dr. Marquez    Current Problem  1. Orthopedic aftercare        2. Knee swelling        3. Difficulty walking        4. Balance problems        5. Decreased range of motion of left knee        6. Lower extremity weakness        7. Atrophy of quadriceps femoris muscle        8. Acute pain of left knee                    Precautions: WBAT       Medical History Form: Reviewed (scanned into chart)    Subjective:   Pt reports knee feels okay today. Has been working on long duration knee flexion over the side of bed/table and says he will spend a long time letting his knee bend over the side, feels like it's getting better.    DOS: 11/17/23    Pain:  Pain Assessment: 0-10  Pain Score: 0 - No pain (not numerically rated this session but pt reported no pain at start of sessio)  Location: L knee  Description: dull      Objective:  Patient presents to clinic with bilateral axillary crutches and hinged knee brace.    Surgical sight inspected: No signs of infection; Stitches intact and wound healing well.        ROM  Post session flexion: 50 deg    Knee AROM (Degrees)      (R)  (L)  Flexion:   Pre session 35deg  Extension:   Can activate in +8deg, cannot do without prop      Knee PROM (Degrees)      (R)  (L)  Flexion:      Extension:   +8        Strength Testing  *No strength testing performed secondary to patient being post-op. Will be assessed at follow up visit.    Pt can perform quad set with inconsistent tetany.     Knee Edema Measurements:    Sweep test: +1      Patella Mobility: Moderately restricted superior, inferior, medial, and lateral patella mobility noted on left secondary to effusion and  "pain.    Palpation: less anterior knee tenderness, increased muscle tension throughout quads, hamstrings      Outcome Measures:        EDUCATION: home exercise program, plan of care, activity modifications, pain management, and injury pathology       Plan of care was developed with input and agreement by the patient      Treatment Performed:    Therapeutic Exercise:    30 min  Squat 2x20 green band assist with/without premod  Assisted heel slides x20  Prone HS curl 3lb 2x12    HEP update and patient education on continued priorities  NP:  LLLD flexion over table adding 3lb ankle weight 3x60\"  TKE red band 5\" holds x20  Assisted SLR 3x5  Shuttle DL level 3 for knee flexion and quad activation  Long lever PB bridge 2x8  Standing heel/toe raises for WB and gait mechanics 2x10  Reiteration of dosage for quad work throughout the day and importance of frequent performance  Medial/lateral weight shift x10    Manual Therapy:    34 min  Assisted knee flexion in prone with premod  Assisted knee flexion over table with premod  Grade 3-4 patellar mobs all directions to tolerance   STM to popliteus, lateral quad, distal hamstrings  Not today:  Extension overpressure      Neuromuscular Re-education:  0 min (not today)  Mtrigger biofeedback 10/10 on/off   Quad set over towel roll x4 min goal 400  Quad set in heel prop x4 min  Includes time for set up  NMES Cym[place] to tolerance  SAQ assisted over foam roll x15 reps 10/20  Quad set in heel prop x15 reps 10/10  Ambulation in clinic with single crutch with cues for assistive device use, knee flexion, and WB    Other: gait training; vaso   0; 15 min  Not today - Alter G 50% BW ambulation with cues for heel strike  Mini squat x15  SL balance   Includes ambulation to/from Alter G in clinic  Vaso medium heat temp mod compression x10 min (includes time for set up)      Assessment:  Pt able to achieve best flexion measurement post op during closed chain squatting at 50degrees. Most of " session performed with premod estim on, with significantly decreased quad guarding noted. Pt does still tend to attempt to guard at end range; difficult to differentiate between endrange stiffness and guarding but limitations in range of motion appear to be due to both of these factors. Pt has made slow incremental progress in knee flexion over the past couple of weeks but ROM is still severely limited compared to where it is expected to be post operatively and is limiting the patient's ability to continue to progress through the rehab program.      Plan:  Continue use of pre-mod stim for knee flexion mobility, squatting, assisted flexion  Continue quad strength/active extension as time allows    If returning post BRANDEE, prioritize flexion mobility and end with quad/extension work    Future visits: gait training in alter G, shuttle, squatting, LAQ variations    HEP: [id number]         Enriqueta Almonte, PT

## 2024-01-08 NOTE — PROGRESS NOTES
Patient comes in for pov on his left knee.  He is working on his exercises and physical therapy.  He has not been able to improve his flexion ROM.  Tightness anterior knee.  Using brace and crutches.      Physical Exam:  Left knee  : incisions healed, arthrofibrosis knee full extension to about 55 degrees of flexion, patellar mobility better but limited, negative lachman, negative varus-valgus stress testing, calf soft and supple, toes pink and warm with good capillary refill, pulses intact, limb swelling appropriate, wiggles toes    Assessment: left knee arthrofibrosis, hx of ACL reconstruction with BPB autograft and partial lateral menisectomy 11/17/23  Plan:   -Due to his arthrofibrosis and inability to get to flexion of 90 degrees despite him in physical therapy working on his knee, we recommend surgical intervention with arthroscopy with lysis of adhesions/fibrotic bands.  Discussed with the patient use of CPM machine is medically necessary after surgery to help maintain flexion range of motion gained after removal of scar tissue and lysis of adhesions.  CPM will be used 6 hours a day.  He will continue to work on knee extension however as a CPM machine sometimes will not allow for full extension.  He will have to work on full extension separately from the CPM machine.  I also discussed with his physical therapist Enriqueta that he will be seen the day after surgery and daily during the post operative weekdays in order to maintain range of motion.  CPM machine is medically necessary to prevent scar tissue formation and improve function and range of motion of the knee.  I certify the medical necessity of the CPM machine used for tot of 3 weeks.  Prescription written.  Contact with company to provide the CPM machine to be placed right after surgery, advancing range of motion as tolerated right after surgery.  Risks of surgery were discussed including pain, bleeding, infection, chronic knee stiffness, knee swelling,  tendon rupture, medical complications, injury nerves or vessels, and others.  Discussed with him the postoperative protocol.  Answered his and his father's questions.  Patient's questions were answered in detail and they are agreeable to above plan.

## 2024-01-09 RX ORDER — SODIUM CHLORIDE 9 MG/ML
100 INJECTION, SOLUTION INTRAVENOUS CONTINUOUS
Status: CANCELLED | OUTPATIENT
Start: 2024-01-09

## 2024-01-10 ENCOUNTER — ANESTHESIA EVENT (OUTPATIENT)
Dept: OPERATING ROOM | Facility: CLINIC | Age: 24
End: 2024-01-10
Payer: COMMERCIAL

## 2024-01-10 ENCOUNTER — HOSPITAL ENCOUNTER (OUTPATIENT)
Facility: CLINIC | Age: 24
Setting detail: OUTPATIENT SURGERY
Discharge: HOME | End: 2024-01-10
Attending: ORTHOPAEDIC SURGERY | Admitting: ORTHOPAEDIC SURGERY
Payer: COMMERCIAL

## 2024-01-10 ENCOUNTER — ANESTHESIA (OUTPATIENT)
Dept: OPERATING ROOM | Facility: CLINIC | Age: 24
End: 2024-01-10
Payer: COMMERCIAL

## 2024-01-10 VITALS
WEIGHT: 168.21 LBS | SYSTOLIC BLOOD PRESSURE: 149 MMHG | RESPIRATION RATE: 18 BRPM | DIASTOLIC BLOOD PRESSURE: 84 MMHG | HEIGHT: 72 IN | OXYGEN SATURATION: 98 % | TEMPERATURE: 97.7 F | HEART RATE: 103 BPM | BODY MASS INDEX: 22.78 KG/M2

## 2024-01-10 DIAGNOSIS — M24.662 ARTHROFIBROSIS OF KNEE JOINT, LEFT: Primary | ICD-10-CM

## 2024-01-10 PROCEDURE — 7100000002 HC RECOVERY ROOM TIME - EACH INCREMENTAL 1 MINUTE: Performed by: ORTHOPAEDIC SURGERY

## 2024-01-10 PROCEDURE — 3700000001 HC GENERAL ANESTHESIA TIME - INITIAL BASE CHARGE: Performed by: ORTHOPAEDIC SURGERY

## 2024-01-10 PROCEDURE — 2500000004 HC RX 250 GENERAL PHARMACY W/ HCPCS (ALT 636 FOR OP/ED): Performed by: NURSE ANESTHETIST, CERTIFIED REGISTERED

## 2024-01-10 PROCEDURE — 3600000009 HC OR TIME - EACH INCREMENTAL 1 MINUTE - PROCEDURE LEVEL FOUR: Performed by: ORTHOPAEDIC SURGERY

## 2024-01-10 PROCEDURE — 2500000004 HC RX 250 GENERAL PHARMACY W/ HCPCS (ALT 636 FOR OP/ED): Performed by: ORTHOPAEDIC SURGERY

## 2024-01-10 PROCEDURE — 7100000001 HC RECOVERY ROOM TIME - INITIAL BASE CHARGE: Performed by: ORTHOPAEDIC SURGERY

## 2024-01-10 PROCEDURE — 7100000009 HC PHASE TWO TIME - INITIAL BASE CHARGE: Performed by: ORTHOPAEDIC SURGERY

## 2024-01-10 PROCEDURE — 2500000001 HC RX 250 WO HCPCS SELF ADMINISTERED DRUGS (ALT 637 FOR MEDICARE OP): Performed by: ANESTHESIOLOGY

## 2024-01-10 PROCEDURE — 3700000002 HC GENERAL ANESTHESIA TIME - EACH INCREMENTAL 1 MINUTE: Performed by: ORTHOPAEDIC SURGERY

## 2024-01-10 PROCEDURE — 3600000004 HC OR TIME - INITIAL BASE CHARGE - PROCEDURE LEVEL FOUR: Performed by: ORTHOPAEDIC SURGERY

## 2024-01-10 PROCEDURE — 2720000007 HC OR 272 NO HCPCS: Performed by: ORTHOPAEDIC SURGERY

## 2024-01-10 PROCEDURE — A29884 PR KNEE SCOPE,LYSIS OF ADHESNS: Performed by: NURSE ANESTHETIST, CERTIFIED REGISTERED

## 2024-01-10 PROCEDURE — A29884 PR KNEE SCOPE,LYSIS OF ADHESNS: Performed by: ANESTHESIOLOGY

## 2024-01-10 PROCEDURE — 7100000010 HC PHASE TWO TIME - EACH INCREMENTAL 1 MINUTE: Performed by: ORTHOPAEDIC SURGERY

## 2024-01-10 PROCEDURE — 2500000005 HC RX 250 GENERAL PHARMACY W/O HCPCS: Performed by: NURSE ANESTHETIST, CERTIFIED REGISTERED

## 2024-01-10 PROCEDURE — 29884 ARTHRS KNEE SURG LYSIS ADS: CPT | Performed by: ORTHOPAEDIC SURGERY

## 2024-01-10 RX ORDER — MIDAZOLAM HYDROCHLORIDE 1 MG/ML
INJECTION, SOLUTION INTRAMUSCULAR; INTRAVENOUS AS NEEDED
Status: DISCONTINUED | OUTPATIENT
Start: 2024-01-10 | End: 2024-01-10

## 2024-01-10 RX ORDER — LIDOCAINE HYDROCHLORIDE 20 MG/ML
INJECTION, SOLUTION INFILTRATION; PERINEURAL AS NEEDED
Status: DISCONTINUED | OUTPATIENT
Start: 2024-01-10 | End: 2024-01-10

## 2024-01-10 RX ORDER — FENTANYL CITRATE 50 UG/ML
INJECTION, SOLUTION INTRAMUSCULAR; INTRAVENOUS AS NEEDED
Status: DISCONTINUED | OUTPATIENT
Start: 2024-01-10 | End: 2024-01-10

## 2024-01-10 RX ORDER — CEFAZOLIN SODIUM 2 G/100ML
2 INJECTION, SOLUTION INTRAVENOUS ONCE
Status: DISCONTINUED | OUTPATIENT
Start: 2024-01-10 | End: 2024-01-10 | Stop reason: HOSPADM

## 2024-01-10 RX ORDER — MELATONIN 10 MG
10 CAPSULE ORAL NIGHTLY PRN
COMMUNITY

## 2024-01-10 RX ORDER — ONDANSETRON 4 MG/1
4 TABLET, FILM COATED ORAL EVERY 8 HOURS PRN
Qty: 20 TABLET | Refills: 0 | Status: SHIPPED | OUTPATIENT
Start: 2024-01-10

## 2024-01-10 RX ORDER — ACETAMINOPHEN 325 MG/1
TABLET ORAL AS NEEDED
Status: DISCONTINUED | OUTPATIENT
Start: 2024-01-10 | End: 2024-01-10

## 2024-01-10 RX ORDER — ASPIRIN 325 MG
325 TABLET, DELAYED RELEASE (ENTERIC COATED) ORAL DAILY
Qty: 14 TABLET | Refills: 0 | Status: SHIPPED | OUTPATIENT
Start: 2024-01-11

## 2024-01-10 RX ORDER — GLYCOPYRROLATE 0.2 MG/ML
INJECTION INTRAMUSCULAR; INTRAVENOUS AS NEEDED
Status: DISCONTINUED | OUTPATIENT
Start: 2024-01-10 | End: 2024-01-10

## 2024-01-10 RX ORDER — HYDROMORPHONE HYDROCHLORIDE 1 MG/ML
INJECTION, SOLUTION INTRAMUSCULAR; INTRAVENOUS; SUBCUTANEOUS AS NEEDED
Status: DISCONTINUED | OUTPATIENT
Start: 2024-01-10 | End: 2024-01-10

## 2024-01-10 RX ORDER — CELECOXIB 200 MG/1
CAPSULE ORAL AS NEEDED
Status: DISCONTINUED | OUTPATIENT
Start: 2024-01-10 | End: 2024-01-10

## 2024-01-10 RX ORDER — ONDANSETRON HYDROCHLORIDE 2 MG/ML
INJECTION, SOLUTION INTRAVENOUS AS NEEDED
Status: DISCONTINUED | OUTPATIENT
Start: 2024-01-10 | End: 2024-01-10

## 2024-01-10 RX ORDER — CEFAZOLIN 1 G/1
INJECTION, POWDER, FOR SOLUTION INTRAVENOUS AS NEEDED
Status: DISCONTINUED | OUTPATIENT
Start: 2024-01-10 | End: 2024-01-10

## 2024-01-10 RX ORDER — MORPHINE SULFATE 2 MG/ML
INJECTION, SOLUTION INTRAMUSCULAR; INTRAVENOUS AS NEEDED
Status: DISCONTINUED | OUTPATIENT
Start: 2024-01-10 | End: 2024-01-10 | Stop reason: HOSPADM

## 2024-01-10 RX ORDER — BUPIVACAINE HYDROCHLORIDE 2.5 MG/ML
INJECTION, SOLUTION EPIDURAL; INFILTRATION; INTRACAUDAL AS NEEDED
Status: DISCONTINUED | OUTPATIENT
Start: 2024-01-10 | End: 2024-01-10 | Stop reason: HOSPADM

## 2024-01-10 RX ORDER — PROPOFOL 10 MG/ML
INJECTION, EMULSION INTRAVENOUS AS NEEDED
Status: DISCONTINUED | OUTPATIENT
Start: 2024-01-10 | End: 2024-01-10

## 2024-01-10 RX ORDER — OXYCODONE HYDROCHLORIDE 5 MG/1
5 TABLET ORAL EVERY 4 HOURS PRN
Status: DISCONTINUED | OUTPATIENT
Start: 2024-01-10 | End: 2024-01-10 | Stop reason: HOSPADM

## 2024-01-10 RX ORDER — SODIUM CHLORIDE 9 MG/ML
100 INJECTION, SOLUTION INTRAVENOUS CONTINUOUS
Status: DISCONTINUED | OUTPATIENT
Start: 2024-01-10 | End: 2024-01-10 | Stop reason: HOSPADM

## 2024-01-10 RX ORDER — LIDOCAINE IN NACL,ISO-OSMOT/PF 30 MG/3 ML
0.1 SYRINGE (ML) INJECTION ONCE
Status: DISCONTINUED | OUTPATIENT
Start: 2024-01-10 | End: 2024-01-10 | Stop reason: HOSPADM

## 2024-01-10 RX ORDER — OXYCODONE HYDROCHLORIDE 5 MG/1
10 TABLET ORAL EVERY 4 HOURS PRN
Status: DISCONTINUED | OUTPATIENT
Start: 2024-01-10 | End: 2024-01-10 | Stop reason: HOSPADM

## 2024-01-10 RX ORDER — DEXAMETHASONE SODIUM PHOSPHATE 100 MG/10ML
INJECTION INTRAMUSCULAR; INTRAVENOUS AS NEEDED
Status: DISCONTINUED | OUTPATIENT
Start: 2024-01-10 | End: 2024-01-10

## 2024-01-10 RX ORDER — PROPOFOL 10 MG/ML
INJECTION, EMULSION INTRAVENOUS CONTINUOUS PRN
Status: DISCONTINUED | OUTPATIENT
Start: 2024-01-10 | End: 2024-01-10

## 2024-01-10 RX ORDER — SODIUM CHLORIDE, SODIUM LACTATE, POTASSIUM CHLORIDE, AND CALCIUM CHLORIDE .6; .31; .03; .02 G/100ML; G/100ML; G/100ML; G/100ML
IRRIGANT IRRIGATION AS NEEDED
Status: DISCONTINUED | OUTPATIENT
Start: 2024-01-10 | End: 2024-01-10 | Stop reason: HOSPADM

## 2024-01-10 RX ORDER — SODIUM CHLORIDE, SODIUM LACTATE, POTASSIUM CHLORIDE, CALCIUM CHLORIDE 600; 310; 30; 20 MG/100ML; MG/100ML; MG/100ML; MG/100ML
100 INJECTION, SOLUTION INTRAVENOUS CONTINUOUS
Status: DISCONTINUED | OUTPATIENT
Start: 2024-01-10 | End: 2024-01-10 | Stop reason: HOSPADM

## 2024-01-10 RX ORDER — DOCUSATE SODIUM 100 MG/1
100 CAPSULE, LIQUID FILLED ORAL 2 TIMES DAILY
Qty: 20 CAPSULE | Refills: 0 | Status: SHIPPED | OUTPATIENT
Start: 2024-01-10

## 2024-01-10 RX ORDER — ONDANSETRON HYDROCHLORIDE 2 MG/ML
4 INJECTION, SOLUTION INTRAVENOUS ONCE AS NEEDED
Status: DISCONTINUED | OUTPATIENT
Start: 2024-01-10 | End: 2024-01-10 | Stop reason: HOSPADM

## 2024-01-10 RX ORDER — OXYCODONE AND ACETAMINOPHEN 5; 325 MG/1; MG/1
1 TABLET ORAL EVERY 6 HOURS PRN
Qty: 20 TABLET | Refills: 0 | Status: SHIPPED | OUTPATIENT
Start: 2024-01-10 | End: 2024-01-15

## 2024-01-10 RX ORDER — ROPIVACAINE HYDROCHLORIDE 5 MG/ML
INJECTION, SOLUTION EPIDURAL; INFILTRATION; PERINEURAL AS NEEDED
Status: DISCONTINUED | OUTPATIENT
Start: 2024-01-10 | End: 2024-01-10 | Stop reason: HOSPADM

## 2024-01-10 RX ORDER — HYDROMORPHONE HYDROCHLORIDE 1 MG/ML
0.5 INJECTION, SOLUTION INTRAMUSCULAR; INTRAVENOUS; SUBCUTANEOUS EVERY 5 MIN PRN
Status: DISCONTINUED | OUTPATIENT
Start: 2024-01-10 | End: 2024-01-10 | Stop reason: HOSPADM

## 2024-01-10 RX ADMIN — DEXAMETHASONE SODIUM PHOSPHATE 4 MG: 10 INJECTION INTRAMUSCULAR; INTRAVENOUS at 08:04

## 2024-01-10 RX ADMIN — MIDAZOLAM 2 MG: 1 INJECTION INTRAMUSCULAR; INTRAVENOUS at 07:41

## 2024-01-10 RX ADMIN — HYDROMORPHONE HYDROCHLORIDE 0.5 MG: 1 INJECTION, SOLUTION INTRAMUSCULAR; INTRAVENOUS; SUBCUTANEOUS at 08:03

## 2024-01-10 RX ADMIN — CELECOXIB 400 MG: 200 CAPSULE ORAL at 07:20

## 2024-01-10 RX ADMIN — PROPOFOL 50 MCG/KG/MIN: 10 INJECTION, EMULSION INTRAVENOUS at 07:56

## 2024-01-10 RX ADMIN — OXYCODONE 5 MG: 5 TABLET ORAL at 11:06

## 2024-01-10 RX ADMIN — FENTANYL CITRATE 50 MCG: 50 INJECTION, SOLUTION INTRAMUSCULAR; INTRAVENOUS at 07:58

## 2024-01-10 RX ADMIN — HYDROMORPHONE HYDROCHLORIDE 0.5 MG: 1 INJECTION, SOLUTION INTRAMUSCULAR; INTRAVENOUS; SUBCUTANEOUS at 08:18

## 2024-01-10 RX ADMIN — ONDANSETRON 4 MG: 2 INJECTION INTRAMUSCULAR; INTRAVENOUS at 09:10

## 2024-01-10 RX ADMIN — SODIUM CHLORIDE, SODIUM LACTATE, POTASSIUM CHLORIDE, AND CALCIUM CHLORIDE: .6; .31; .03; .02 INJECTION, SOLUTION INTRAVENOUS at 07:33

## 2024-01-10 RX ADMIN — GLYCOPYRROLATE 0.2 MG: 0.2 INJECTION INTRAMUSCULAR; INTRAVENOUS at 07:41

## 2024-01-10 RX ADMIN — PROPOFOL 200 MG: 10 INJECTION, EMULSION INTRAVENOUS at 07:51

## 2024-01-10 RX ADMIN — LIDOCAINE HYDROCHLORIDE 80 MG: 20 INJECTION, SOLUTION INFILTRATION; PERINEURAL at 07:51

## 2024-01-10 RX ADMIN — PROPOFOL 100 MG: 10 INJECTION, EMULSION INTRAVENOUS at 07:53

## 2024-01-10 RX ADMIN — CEFAZOLIN 2 G: 1 INJECTION, POWDER, FOR SOLUTION INTRAMUSCULAR; INTRAVENOUS at 08:00

## 2024-01-10 RX ADMIN — FENTANYL CITRATE 50 MCG: 50 INJECTION, SOLUTION INTRAMUSCULAR; INTRAVENOUS at 07:51

## 2024-01-10 RX ADMIN — ACETAMINOPHEN 975 MG: 325 TABLET ORAL at 07:20

## 2024-01-10 SDOH — HEALTH STABILITY: MENTAL HEALTH: CURRENT SMOKER: 0

## 2024-01-10 ASSESSMENT — COLUMBIA-SUICIDE SEVERITY RATING SCALE - C-SSRS
6. HAVE YOU EVER DONE ANYTHING, STARTED TO DO ANYTHING, OR PREPARED TO DO ANYTHING TO END YOUR LIFE?: NO
2. HAVE YOU ACTUALLY HAD ANY THOUGHTS OF KILLING YOURSELF?: NO
1. IN THE PAST MONTH, HAVE YOU WISHED YOU WERE DEAD OR WISHED YOU COULD GO TO SLEEP AND NOT WAKE UP?: NO

## 2024-01-10 ASSESSMENT — PAIN DESCRIPTION - ORIENTATION: ORIENTATION: LEFT

## 2024-01-10 ASSESSMENT — PAIN SCALES - GENERAL
PAINLEVEL_OUTOF10: 1
PAINLEVEL_OUTOF10: 0 - NO PAIN
PAINLEVEL_OUTOF10: 4
PAINLEVEL_OUTOF10: 3

## 2024-01-10 ASSESSMENT — PAIN - FUNCTIONAL ASSESSMENT
PAIN_FUNCTIONAL_ASSESSMENT: 0-10

## 2024-01-10 ASSESSMENT — PAIN DESCRIPTION - LOCATION: LOCATION: KNEE

## 2024-01-10 ASSESSMENT — ENCOUNTER SYMPTOMS: JOINT SWELLING: 1

## 2024-01-10 NOTE — ANESTHESIA POSTPROCEDURE EVALUATION
Patient: Diego Prasad    Procedure Summary       Date: 01/10/24 Room / Location: [facility] OR 04 / [facility] [place] [facility]    Anesthesia Start: 0743 Anesthesia Stop: 1007    Procedure: Left Knee Arthroscopy; Lysis Adhesions (Left: Knee) Diagnosis:       Arthrofibrosis of knee joint, left      (Arthrofibrosis of knee joint, left [M24.662])    Surgeons: Cammie Marquez MD Responsible Provider: Sae Moon MD    Anesthesia Type: general ASA Status: 1            Anesthesia Type: general    Vitals Value Taken Time   BP 88/46 01/10/24 1006   Temp 36 °C (96.8 °F) 01/10/24 1006   Pulse 77 01/10/24 1006   Resp 16 01/10/24 1006   SpO2 99 % 01/10/24 1006       Anesthesia Post Evaluation    Patient location during evaluation: PACU  Patient participation: waiting for patient participation  Level of consciousness: sedated  Pain management: adequate  Airway patency: patent  Cardiovascular status: acceptable  Respiratory status: acceptable  Hydration status: acceptable  Postoperative Nausea and Vomiting: none        There were no known notable events for this encounter.

## 2024-01-10 NOTE — H&P
History Of Present Illness  Diego Prasad is a 23 y.o. male presenting with left knee arthrofibrosis/adhesions.     Past Medical History  Past Medical History:   Diagnosis Date    Anxiety        Surgical History  Past Surgical History:   Procedure Laterality Date    ANTERIOR CRUCIATE LIGAMENT REPAIR      left leg        Social History  He reports that he has never smoked. He has never used smokeless tobacco. He reports current alcohol use of about 5.0 standard drinks of alcohol per week. He reports that he does not currently use drugs.    Family History  No family history on file.     Allergies  Patient has no known allergies.    Review of Systems   Musculoskeletal:  Positive for joint swelling.   All other systems reviewed and are negative.       Physical Exam  Vitals reviewed.   Constitutional:       Appearance: Normal appearance. He is normal weight.   HENT:      Head: Normocephalic.      Nose: Nose normal.   Pulmonary:      Effort: Pulmonary effort is normal.   Musculoskeletal:      Cervical back: Normal range of motion.   Neurological:      Mental Status: He is alert and oriented to person, place, and time.   Psychiatric:         Mood and Affect: Mood normal.         Behavior: Behavior normal.         Thought Content: Thought content normal.         Judgment: Judgment normal.          Last Recorded Vitals  Blood pressure 132/86, pulse 79, temperature 37.2 °C (99 °F), temperature source Temporal, resp. rate 18, height 1.829 m (6'), weight 76.3 kg (168 lb 3.4 oz), SpO2 97 %.    Relevant Results             Assessment/Plan   Principal Problem:    Arthrofibrosis of knee joint, left      Left knee arthroscopy with extensive debridement and lysis of adhesions       I spent 10 minutes in the professional and overall care of this patient.      Esvin Barcenas PA-C

## 2024-01-10 NOTE — DISCHARGE INSTRUCTIONS
May have Tylenol after: !:00 pm    May have Ibuprofen/advil/motrin/aleve after: 1/11/24    To reach your physician after hours call: [phone]  Ask for the physician on callMEDICATION SIDE EFFECTS.  OXYCODONE: constipation, nausea, vomiting, upset stomach, (sleepiness), dizziness, lightheadedness, itching, headache, blurred vision, dry mouth, sweating  ASPIRIN:  upset stomach, heartburn; drowsiness; or headache    Call if any drainage after 7 days, increased redness/warmth/swelling at incision site, pain/tenderness of calf, swelling of calf that does not respond to elevation, SOB/chest pain.    Do not swim in pools or ponds until 3 months after surgery.  Apply AKBAR hose or ACE bandages to both lower legs x 6 weeks; remove at night.  Do not visit the dentist until 3 months after the surgery date.  You will need to take an antibiotic previous to any dental procedure and colonoscopy.  Please call [phone] and request a prescription for antibiotics for previous to these procedures.    Continuous passive motion (CPM) device for 6 hours a day. 0-110 degrees of motion. Can use ice machine for pain control when not using CPM device. HKB unlocked when ambulating.

## 2024-01-10 NOTE — ANESTHESIA PROCEDURE NOTES
Airway  Date/Time: 1/10/2024 7:54 AM  Urgency: elective    Airway not difficult    Staffing  Performed: CRNA   Authorized by: Sae Moon MD    Performed by: MICK Gomez-JUAN  Patient location during procedure: OR    Indications and Patient Condition  Indications for airway management: anesthesia  Spontaneous ventilation: present  Sedation level: deep  Preoxygenated: yes  Patient position: sniffing  MILS maintained throughout  Mask difficulty assessment: 1 - vent by mask  Planned trial extubation    Final Airway Details  Final airway type: supraglottic airway         Number of attempts at approach: 1    Additional Comments  IGEL LMA

## 2024-01-10 NOTE — ANESTHESIA PREPROCEDURE EVALUATION
Patient: Diego Prasad    Procedure Information       Date/Time: 01/10/24 5169    Procedure: Left Knee Arthroscopy; Lysis Adhesions (Left: Knee)    Location: [facility] [place] [place] [facility] OR 04 / [facility] [place] [place] [facility]    Surgeons: Cammie Marquez MD            Relevant Problems   Anesthesia (within normal limits)   (-) Malignant hyperthermia   (-) PONV (postoperative nausea and vomiting)      Cardiovascular   (+) Abnormal EKG   (+) Second degree AV block      Endocrine (within normal limits)      GI (within normal limits)      /Renal (within normal limits)      Neuro/Psych (within normal limits)      Pulmonary (within normal limits)      GI/Hepatic (within normal limits)      Hematology (within normal limits)      Musculoskeletal (within normal limits)      Eyes, Ears, Nose, and Throat (within normal limits)      Infectious Disease (within normal limits)       Clinical information reviewed:   Tobacco  Allergies  Meds   Med Hx  Surg Hx   Fam Hx  Soc Hx        NPO Detail:  No data recorded     Physical Exam    Airway  Mallampati: II  TM distance: >3 FB     Cardiovascular   Rhythm: regular  Rate: normal     Dental - normal exam     Pulmonary - normal exam     Abdominal - normal exam           Anesthesia Plan    History of general anesthesia?: yes  History of complications of general anesthesia?: no    ASA 1     general     The patient is not a current smoker.    intravenous induction   Anesthetic plan and risks discussed with patient.    Plan discussed with CAA.

## 2024-01-10 NOTE — OP NOTE
Left Knee Arthroscopy; Lysis Adhesions (L) Operative Note     Date: 1/10/2024  [place] [facility] [place] [facility] [place]    Name: Diego Prasad, : 2000, Age: 23 y.o., MRN: [id number], Sex: male    Diagnosis  Pre-op Diagnosis     * Arthrofibrosis of knee joint, left [M24.662] Post-op Diagnosis     * Arthrofibrosis of knee joint, left [M24.662]     Procedures  Left Knee Arthroscopy; Lysis Adhesions  70939 - [place] ARTHROSCOPY KNEE W/LYSIS ADHESIONS W/WO MANJ SPX      Surgeons      * Cammie Marquez - Primary    Resident/Fellow/Other Assistant:  Surgeon(s) and Role:     * Yosef Medel MD - Fellow    Procedure Summary  Anesthesia: General  ASA: I  Anesthesia Staff: Anesthesiologist: Sae Moon MD  CRNA: MICK Gomez-CRNA  Estimated Blood Loss: 10mL  Intra-op Medications:   Medication Name Total Dose   bupivacaine PF (Marcaine) 0.25 % (2.5 mg/mL) injection 6 mL   ropivacaine (Naropin) injection 6 mL   morphine injection 2 mg   lactated Ringer's irrigation solution 12,000 mL              Anesthesia Record               Intraprocedure I/O Totals          Intake    LR bolus 700.00 mL    Propofol Drip 0.00 mL    The total shown is the total volume documented since Anesthesia Start was filed.    Total Intake 700 mL          Specimen: No specimens collected     Staff:   Circulator: Brennan Robin RN  Scrub Person: Mary Monsivais         Drains and/or Catheters: * None in log *    Tourniquet Times:     Total Tourniquet Time Documented:  Thigh (Left) - 97 minutes  Total: Thigh (Left) - 97 minutes      Implants: None    Findings: Knee arthrofibrosis, 0-40 degrees of motion    Complications:  None; patient tolerated the procedure well.    Disposition: PACU - hemodynamically stable.  Condition: stable     INDICATIONS FOR PROCEDURE  This is a 23 year old M who presented with right knee arthrofibrosis s/p ACL reconstruction on 2023 recalcitrant to conservative treatment and significantly limiting their function.  After discussing the alternatives of treatment in detail with the patient, the patient selected surgical intervention and/or reconstruction.  We discussed with the patient risks and benefits of the procedure(s).  Risks of surgery were reviewed including pain, bleeding, infection, wound healing problems, soft tissue or bone healing problems, injury to nerves or vessels, implant or hardware related complications, chronic extremity stiffness or pain or swelling, post-traumatic arthritis, recurrent symptoms, DVT, PE and other medical complications.  After the patient's questions were answered in detail including post-operative course requiring a continuous passive motion device and the extensive rehabilitation needed after surgery, the patient then gave informed consent for the procedure.    DESCRIPTION OF PROCEDURE  The patient was identified in the pre-operative area and the surgical extremity was marked with a skin marker to designate surgical site. Patient then was brought back to the operating room.  A huddle was called and confirmed upon entry into the operating room as per protocol.  Time out was called and confirmed prior to skin incision.  General anesthetic was administered and LMA placed without complication.  Patient received IV ancef prior to skin incision as per protocol.  DVT prophylaxis was performed using stocking and SCD application to the non-operative extremity.  A well-padded tourniquet was placed on the operative extremity and was elevated to 280mmHg for 86 minutes during the case.  The patient then was carefully positioned supine with all superficial nerve areas, and bony prominences well-padded and protected during the case. Prior to preping and draping, we performed a manipulation under anesthesia. Prior to our manipulation the patient had full extension and about 40 degrees of flexion. We were able to obtain about 110 degrees of flexion through our gentle manipulation. We then proceeded to prep  and drape in the usual standard sterile fashion.    Left knee arthroscopy with lysis of adhesions and fibrotic bands.  We began by making a standard anterolateral portal. We then visualized the suprapatellar pouch which had extensive adhesions present. The patellofemoral joint was also visualized which also had no evidence of degenerative changes. We then proceeded by making a standard anteromedial portal under direct visualization making sure to protect the medial meniscus. We then began working in the suprapatellar pouch releasing adhesions with a combination of the arthroscopic shaver and the electrocautery device. Once we were satisfied with our release in the supra-patellar pouch we moved to the medial gutter and continued to release adhesions. We also performed a small medial retinacular release as the patient had limited patellofemoral joint motion pre-operatively. Once we were satisfied with our release in the medial gutter we switched portals and viewed through the anteromedial portal and worked through the anterolateral portal. We returned up to the suprapatellar pouch and continued to release the adhesions from the lateral side. We then released the adhesions in the lateral gutter. Finally, we turned our attention to the notch and debrided scar tissue in this area. ACL graft was intact.  PCL also intact.  We entered both the medial and lateral compartments of the knee. No loose bodies were present and the medial and lateral meniscus were intact on evaluation with probe. At this point we took one more look around the knee with the electrocautery to cauterize any bleeding vessels. We then kept the scope in the suprapatellar pouch and turned off the inflow and use suction to remove as much free fluid from the knee as possible. Once we were completed with the arthroscopy portion we were able to gently range the knee from -5-130 degrees.    Tourniquet was released and all toes were pink and warm and distal  pulses intact.  Dressing including xeroform, fluffs, ABD's, soft roll was applied.  Dressing completed with ace wraps.  Left knee was then placed in the CPM machine.  The patient was transported to the PACU in stable condition.  CPM  to appropriate settings and patient and his family received instruction on how to use the machine preoperatively by the company representative.  Instructions on using up to 6 hours daily the CPM machine.  When not using machine, use knee brace and crutches and WBAT.  Discussed with patient to work the knee extension ROM a few times a day outside of the CPM machine; he showed me how he stretches and it was appropriate.  He has physical therapy appointment tomorrow.  Patient fulfilled post-procedure discharge criteria and was released home.  Patient and patient representatives were given detailed discharge instructions and home-going medications including Percocet for severe pain only, Zofran, Senna/Colace and DVT prophylaxis with enteric-coated aspirin.  Patient will be weight bearing as tolerated on their operative extremity.  Dressing will be kept in place until follow-up with us in 10-14 days.  We discussed with the patient  the different medications available for DVT prophylaxis and after discussion of risks and benefits the patient selected the above.  Findings were discussed with the patient and their representative after the procedure and questions were answered in detail.      Additional Details:   Patient will utilize the continuous passive motion (CPM) device post-operatively for at least 6 hours a day. The CPM is set at 0-110 degrees of motion. He can utilize his ice pack when out of the CPM device and can use his hinged knee brace when ambulating.     Attending Attestation: I was present for the entirety of the procedure(s).     MD Cammie Moyer  Phone Number: [phone]

## 2024-01-10 NOTE — BRIEF OP NOTE
Date: 1/10/2024  OR Location: [facility] [place] [facility]    Name: Diego Prasad, : 2000, Age: 23 y.o., MRN: [id number], Sex: male    Diagnosis  Pre-op Diagnosis     * Arthrofibrosis of knee joint, left [M24.662] Post-op Diagnosis     * Arthrofibrosis of knee joint, left [M24.662]     Procedures  Left Knee Arthroscopy; Lysis Adhesions  10200 - [place] ARTHROSCOPY KNEE W/LYSIS ADHESIONS W/WO MANJ SPX      Surgeons      * Cammie Marquez - Primary    Resident/Fellow/Other Assistant:  Surgeon(s) and Role:     * Yosef Medel MD - Fellow    Procedure Summary  Anesthesia: General  ASA: I  Anesthesia Staff: Anesthesiologist: Sae Moon MD  CRNA: MICK Gomez-CRNA  Estimated Blood Loss: 10mL  Intra-op Medications:   Medication Name Total Dose   bupivacaine PF (Marcaine) 0.25 % (2.5 mg/mL) injection 6 mL   ropivacaine (Naropin) injection 6 mL   morphine injection 2 mg   lactated Ringer's irrigation solution 12,000 mL              Anesthesia Record               Intraprocedure I/O Totals          Intake    LR bolus 700.00 mL    Propofol Drip 0.00 mL    The total shown is the total volume documented since Anesthesia Start was filed.    Total Intake 700 mL          Specimen: No specimens collected     Staff:   Circulator: Brennan Robin RN  Scrub Person: Mary Monsivais  `    Findings: Left knee arthrofibrosis    Complications:  None; patient tolerated the procedure well.     Disposition: PACU - hemodynamically stable.  Condition: stable  Specimens Collected: No specimens collected  Attending Attestation: I was present for the entirety of the procedure(s).     MD Cammie Moyer MD Shana N Miskovsky  Phone Number: [phone]

## 2024-01-11 ENCOUNTER — TREATMENT (OUTPATIENT)
Dept: PHYSICAL THERAPY | Facility: HOSPITAL | Age: 24
End: 2024-01-11
Payer: COMMERCIAL

## 2024-01-11 DIAGNOSIS — M25.562 ACUTE PAIN OF LEFT KNEE: ICD-10-CM

## 2024-01-11 DIAGNOSIS — R26.89 BALANCE PROBLEMS: ICD-10-CM

## 2024-01-11 DIAGNOSIS — Z47.89 ORTHOPEDIC AFTERCARE: Primary | ICD-10-CM

## 2024-01-11 DIAGNOSIS — M62.559 ATROPHY OF QUADRICEPS FEMORIS MUSCLE: ICD-10-CM

## 2024-01-11 DIAGNOSIS — M25.469 KNEE SWELLING: ICD-10-CM

## 2024-01-11 DIAGNOSIS — R29.898 LOWER EXTREMITY WEAKNESS: ICD-10-CM

## 2024-01-11 DIAGNOSIS — R26.2 DIFFICULTY WALKING: ICD-10-CM

## 2024-01-11 DIAGNOSIS — M25.662 DECREASED RANGE OF MOTION OF LEFT KNEE: ICD-10-CM

## 2024-01-11 PROCEDURE — 97110 THERAPEUTIC EXERCISES: CPT | Mod: GP

## 2024-01-11 PROCEDURE — 97112 NEUROMUSCULAR REEDUCATION: CPT | Mod: GP

## 2024-01-11 PROCEDURE — 97140 MANUAL THERAPY 1/> REGIONS: CPT | Mod: GP

## 2024-01-11 PROCEDURE — 97016 VASOPNEUMATIC DEVICE THERAPY: CPT | Mod: GP

## 2024-01-11 ASSESSMENT — PAIN SCALES - GENERAL
PAINLEVEL_OUTOF10: 7
PAINLEVEL_OUTOF10: 3

## 2024-01-11 NOTE — PROGRESS NOTES
Physical Therapy  Physical Therapy Orthopedic Progress Note    Patient Name: Diego Prasad  MRN: [id number]  Today's Date: 01/11/2024       Insurance:  Visit number: 3 of 25  Total visit count: 23  Authorization info: no auth  Insurance Type: [facility] choice+ vaso ok    General:  Reason for visit: L ACLR PBTP + BRANDEE/YARI 1/10/24  Referred by: Dr. Marquez    Current Problem  1. Orthopedic aftercare        2. Knee swelling        3. Difficulty walking        4. Balance problems        5. Decreased range of motion of left knee        6. Lower extremity weakness        7. Atrophy of quadriceps femoris muscle        8. Acute pain of left knee                      Precautions: WBAT       Medical History Form: Reviewed (scanned into chart)    Subjective:   Pt presents POD 1 s/p lysis of adhesion, small medial retinaculum release, and YARI. Reports 6-7/10 anterior knee pain. Denies calf pain, chest pain and shortness of breath. Reports mild nausea from anesthesia but no other adverse events. Has started using CPM at home set at 110 and working on extension in between. Has been able to put some weight on his foot.    DOS: 11/17/23  BRANDEE/YARI: 1/10/24    Pain:  Pain Score: 7  Location: L knee  Description: dull      Objective:  Patient presents to clinic with bilateral axillary crutches and hinged knee brace.    Surgical sight inspected: No signs of infection; Stitches intact and wound healing well.        ROM  Pre session ROM 5-31  Post session ROM 5-0-102 (with assistance)    Knee AROM (Degrees)      (R)  (L)  Flexion:   Pre session 35deg  Extension:   Can activate in +8deg, cannot do without prop      Knee PROM (Degrees)      (R)  (L)  Flexion:      Extension:   +8        Strength Testing  *No strength testing performed secondary to patient being post-op. Will be assessed at follow up visit.    Pt can perform quad set with inconsistent tetany.     Knee Edema Measurements:    Effusion present consistent with post op  "presentation      Patella Mobility: Moderately restricted superior, inferior, medial, and lateral patella mobility noted on left secondary to effusion and pain.    Palpation: less anterior knee tenderness, increased muscle tension throughout quads, hamstrings      Outcome Measures:        EDUCATION: home exercise program, plan of care, activity modifications, pain management, and injury pathology       Plan of care was developed with input and agreement by the patient      Treatment Performed:    Therapeutic Exercise:    26 min  Heel slides  Self assisted flexion over pressure in short sitting  Reviewed quad sets  Weighted extension prop 3lb ankle weight x5 min 10\" on/off quad sets  HEP update and patient education on continued priorities of rehab, acceptable levels of soreness/symptoms, operative findings and safety of progressive motion work  NP:  LLLD flexion over table adding 3lb ankle weight 3x60\"  TKE red band 5\" holds x20  Assisted SLR 3x5  Shuttle DL level 3 for knee flexion and quad activation  Long lever PB bridge 2x8  Standing heel/toe raises for WB and gait mechanics 2x10  Reiteration of dosage for quad work throughout the day and importance of frequent performance  Medial/lateral weight shift x10    Manual Therapy:    35 min  Assisted heel slides  Assisted knee flexion in short sitting      Neuromuscular Re-education:  10 min  Mtrigger biofeedback 10/10 on/off   Quad set over towel roll with strap assist x5 min goal 200  Quad set in heel prop x5 min goal 400        Other: gait training; vaso   0; 15 min  Vaso medium cold temp mod compression x15 min       Assessment:  Pt presented with significant improvements in flexion ROM, with best flexion measurements to date even compared to his pre-operative physical therapy course. Significantly less guarding and mild stiffness noted at end range. Able to gain 5deg of hyperextension by end of session, so he is only missing a few degrees of his hyperextension " (comparable to the other side). Pt verbalized understanding of HEP priorities and immediate post op POC plans.      Plan:  STM, patellar mobility  Continue flexion work, followed by active extension work      Future visits: gait training in alter G, shuttle, squatting, LAQ variations    HEP: [id number]         Enriqueta Almonte, PT

## 2024-01-12 ENCOUNTER — OFFICE VISIT (OUTPATIENT)
Dept: ORTHOPEDIC SURGERY | Facility: HOSPITAL | Age: 24
End: 2024-01-12
Payer: COMMERCIAL

## 2024-01-12 ENCOUNTER — TREATMENT (OUTPATIENT)
Dept: PHYSICAL THERAPY | Facility: HOSPITAL | Age: 24
End: 2024-01-12
Payer: COMMERCIAL

## 2024-01-12 DIAGNOSIS — Z47.89 ORTHOPEDIC AFTERCARE: Primary | ICD-10-CM

## 2024-01-12 DIAGNOSIS — M62.559 ATROPHY OF QUADRICEPS FEMORIS MUSCLE: ICD-10-CM

## 2024-01-12 DIAGNOSIS — M25.662 DECREASED RANGE OF MOTION OF LEFT KNEE: ICD-10-CM

## 2024-01-12 DIAGNOSIS — M25.562 ACUTE PAIN OF LEFT KNEE: ICD-10-CM

## 2024-01-12 DIAGNOSIS — Z98.890 S/P LEFT KNEE ARTHROSCOPY: Primary | ICD-10-CM

## 2024-01-12 DIAGNOSIS — M24.662 ARTHROFIBROSIS OF KNEE JOINT, LEFT: ICD-10-CM

## 2024-01-12 DIAGNOSIS — R29.898 LOWER EXTREMITY WEAKNESS: ICD-10-CM

## 2024-01-12 DIAGNOSIS — M25.469 KNEE SWELLING: ICD-10-CM

## 2024-01-12 DIAGNOSIS — R26.89 BALANCE PROBLEMS: ICD-10-CM

## 2024-01-12 DIAGNOSIS — R26.2 DIFFICULTY WALKING: ICD-10-CM

## 2024-01-12 DIAGNOSIS — S83.512A RUPTURE OF ANTERIOR CRUCIATE LIGAMENT OF LEFT KNEE, INITIAL ENCOUNTER: ICD-10-CM

## 2024-01-12 PROCEDURE — 99024 POSTOP FOLLOW-UP VISIT: CPT | Performed by: PHYSICIAN ASSISTANT

## 2024-01-12 PROCEDURE — 97110 THERAPEUTIC EXERCISES: CPT | Mod: GP

## 2024-01-12 PROCEDURE — 97016 VASOPNEUMATIC DEVICE THERAPY: CPT | Mod: GP

## 2024-01-12 PROCEDURE — 1036F TOBACCO NON-USER: CPT | Performed by: PHYSICIAN ASSISTANT

## 2024-01-12 PROCEDURE — 97140 MANUAL THERAPY 1/> REGIONS: CPT | Mod: GP

## 2024-01-12 PROCEDURE — 97112 NEUROMUSCULAR REEDUCATION: CPT | Mod: GP

## 2024-01-12 ASSESSMENT — PAIN - FUNCTIONAL ASSESSMENT: PAIN_FUNCTIONAL_ASSESSMENT: 0-10

## 2024-01-12 NOTE — PROGRESS NOTES
NPV-   History of Present Illness  23 y.o.male here for pov s/p left knee arthroscopy with lysis of adhesions on 1/10/24  1. S/P left knee arthroscopy  Polar Care Unit with Pad        . Patient reports they are doing well. Minimal pain. Started PT this morning. Using CPM machine.       On examination of left , incision is clean/dry/intact.  Steri-strips are intact.  No bleeding or drainage. Healing well.  Minimal swelling. Improved ROM and strength. Full extension and flexion to  100 degrees at PT Neurovascularly intact.  Normal sensation to light touch.  Dorsalis pedis and posterior tibial pulses 2+ bilaterally. wiggles toes, calf soft and nontender    A/P: s/p left knee arthroscopy with lysis of adhesions on 1/10/24  -  Patient doing well  - Continue WBAT in knee brace  - The patient was given a prescription for physical therapy.  Physical therapy is medically necessary to improve strength, balance, range of motion and functional outcomes after injury and/or surgery.  - He will continue CPM machine  - All the patient's questions were answered. The patient agrees with the above plan.  Follow up in 2 weeks

## 2024-01-12 NOTE — PROGRESS NOTES
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 Marquez    Current Problem  1. Orthopedic aftercare        2. Knee swelling        3. Difficulty walking        4. Balance problems        5. Decreased range of motion of left knee        6. Lower extremity weakness        7. Atrophy of quadriceps femoris muscle        8. Acute pain of left knee                        Precautions: WBAT       Medical History Form: Reviewed (scanned into chart)    Subjective:   Pt reports he is doing alright, feels like he has almost gotten used to the CPM.     DOS: 11/17/23  BRANDEE/YARI: 1/10/24    Pain:  Pain Assessment: 0-10  Pain Score:  (pain not numerically rated this session to focus on functional goals)  Location: L knee  Description: dull      Objective:  Patient presents to clinic with bilateral axillary crutches and hinged knee brace.    Surgical sight inspected: No signs of infection; Stitches intact and wound healing well.        ROM  Pre session ROM 5-31  Post session ROM 5-0-102 (with assistance)    Knee AROM (Degrees)      (R)  (L)  Flexion:   Pre session 35deg  Extension:   Can activate in +8deg, cannot do without prop      Knee PROM (Degrees)      (R)  (L)  Flexion:      Extension:   +8        Strength Testing  *No strength testing performed secondary to patient being post-op. Will be assessed at follow up visit.    Pt can perform quad set with inconsistent tetany.     Knee Edema Measurements:    Effusion present consistent with post op presentation      Patella Mobility: Moderately restricted superior, inferior,  "medial, and lateral patella mobility noted on left secondary to effusion and pain.    Palpation: less anterior knee tenderness, increased muscle tension throughout quads, hamstrings      Outcome Measures:        EDUCATION: home exercise program, plan of care, activity modifications, pain management, and injury pathology       Plan of care was developed with input and agreement by the patient      Treatment Performed:    Therapeutic Exercise:    20 min  Dressing change  Heel slides  Shuttle 20x2 5 sec holds lv1  Self assisted flexion over pressure in short sitting  Reviewed quad sets  Weighted extension prop 3lb ankle weight x5 min 10\" on/off quad sets  HEP update and patient education on continued priorities of rehab, acceptable levels of soreness/symptoms, operative findings and safety of progressive motion work  NP:  LLLD flexion over table adding 3lb ankle weight 3x60\"  TKE red band 5\" holds x20  Assisted SLR 3x5  Shuttle DL level 3 for knee flexion and quad activation  Long lever PB bridge 2x8  Standing heel/toe raises for WB and gait mechanics 2x10  Reiteration of dosage for quad work throughout the day and importance of frequent performance  Medial/lateral weight shift x10    Manual Therapy:    30 min  Assisted heel slides  Assisted knee flexion in short sitting  STM to quad short sitting  PROM knee flexion and ext      Neuromuscular Re-education:  10 min  NMES x 10 QS and SLR   Not today   Mtrigger biofeedback 10/10 on/off   Quad set over towel roll with strap assist x5 min goal 200  Quad set in heel prop x5 min goal 400        Other: gait training; vaso   0; 15 min  Vaso medium cold temp mod compression x15 min       Assessment:  Pt's motion limited by guarding, but this is still improved compared to pre operative presentation. Able to perform straight leg raise with some deviation/lag by end of session, but demonstrates improving quad function. Motion and exercises progressed with tolerable " discomfort.      Plan:  STM, patellar mobility  Continue flexion work, followed by active extension work  Consider BFR/active mobility to address guarding    Future visits: gait training in dusty G, shuttle, squatting, LAQ variations    HEP: [id number]         Enriqueta Almonte, PT

## 2024-01-15 ENCOUNTER — TREATMENT (OUTPATIENT)
Dept: PHYSICAL THERAPY | Facility: HOSPITAL | Age: 24
End: 2024-01-15
Payer: COMMERCIAL

## 2024-01-15 DIAGNOSIS — R26.2 DIFFICULTY WALKING: ICD-10-CM

## 2024-01-15 DIAGNOSIS — M25.662 DECREASED RANGE OF MOTION OF LEFT KNEE: ICD-10-CM

## 2024-01-15 DIAGNOSIS — M62.559 ATROPHY OF QUADRICEPS FEMORIS MUSCLE: ICD-10-CM

## 2024-01-15 DIAGNOSIS — M25.469 KNEE SWELLING: ICD-10-CM

## 2024-01-15 DIAGNOSIS — Z47.89 ORTHOPEDIC AFTERCARE: Primary | ICD-10-CM

## 2024-01-15 DIAGNOSIS — R29.898 LOWER EXTREMITY WEAKNESS: ICD-10-CM

## 2024-01-15 DIAGNOSIS — M25.562 ACUTE PAIN OF LEFT KNEE: ICD-10-CM

## 2024-01-15 DIAGNOSIS — R26.89 BALANCE PROBLEMS: ICD-10-CM

## 2024-01-15 PROCEDURE — 97140 MANUAL THERAPY 1/> REGIONS: CPT | Mod: GP

## 2024-01-15 PROCEDURE — 97110 THERAPEUTIC EXERCISES: CPT | Mod: GP

## 2024-01-15 PROCEDURE — 97016 VASOPNEUMATIC DEVICE THERAPY: CPT | Mod: GP

## 2024-01-15 ASSESSMENT — PAIN SCALES - GENERAL: PAINLEVEL_OUTOF10: 5 - MODERATE PAIN

## 2024-01-15 ASSESSMENT — PAIN - FUNCTIONAL ASSESSMENT: PAIN_FUNCTIONAL_ASSESSMENT: 0-10

## 2024-01-15 NOTE — PROGRESS NOTES
Physical Therapy  Physical Therapy Orthopedic Progress Note    Patient Name: Diego Prasad  MRN: [id number]  Today's Date: 01/11/2024       Insurance:  Visit number: 5 of 30  Total visit count: 25  Authorization info: no auth  Insurance Type:  employee health plan; vaso ok $3500 deductible    General:  Reason for visit: L ACLR PBTP + BRANDEE/YARI 1/10/24  Referred by: Dr. Marquez    Current Problem  1. Orthopedic aftercare        2. Knee swelling        3. Difficulty walking        4. Balance problems        5. Decreased range of motion of left knee        6. Lower extremity weakness        7. Atrophy of quadriceps femoris muscle        8. Acute pain of left knee                          Precautions: WBAT       Medical History Form: Reviewed (scanned into chart)    Subjective:   Pt reports his knee is still uncomfortable, notices some discomfort in the back. Has kept CPM at 110 and is wondering if he should increase the settings.    DOS: 11/17/23  BRANDEE/YARI: 1/10/24    Pain:  Pain Assessment: 0-10  Pain Score: 5 - Moderate pain  Location: L knee  Description: dull      Objective:  Patient presents to clinic with bilateral axillary crutches and hinged knee brace.    Surgical sight inspected: No signs of infection; Stitches intact and wound healing well.        ROM  Pre session ROM 5-31  Post session ROM 5-0-102 (with assistance)    Knee AROM (Degrees)      (R)  (L)  Flexion:   Pre session 35deg  Extension:   Can activate in +8deg, cannot do without prop      Knee PROM (Degrees)      (R)  (L)  Flexion:      Extension:   +8        Strength Testing  *No strength testing performed secondary to patient being post-op. Will be assessed at follow up visit.    Pt can perform quad set with inconsistent tetany.     Knee Edema Measurements:    Effusion present consistent with post op presentation      Patella Mobility: Moderately restricted superior, inferior, medial, and lateral patella mobility noted on left secondary to  "effusion and pain.    Palpation: less anterior knee tenderness, increased muscle tension throughout quads, hamstrings      Outcome Measures:        EDUCATION: home exercise program, plan of care, activity modifications, pain management, and injury pathology       Plan of care was developed with input and agreement by the patient      Treatment Performed:    Therapeutic Exercise:    30 min  Upright bike x10 min for knee flexion mobility  Shuttle 20x2 5 sec holds lv4  Prone quad stretch 2x60\"  NMES for quad strength and mobility x15 reps  Russian 10/10 LAQ with HS curl  Extra time utilized for transitions/transfers due to patient discomfort  NP:  Heel slides  Reviewed quad sets  Weighted extension prop 3lb ankle weight x5 min 10\" on/off quad sets  HEP update and patient education on continued priorities of rehab, acceptable levels of soreness/symptoms, operative findings and safety of progressive motion work  LLLD flexion over table adding 3lb ankle weight 3x60\"  TKE red band 5\" holds x20  Assisted SLR 3x5  Shuttle DL level 3 for knee flexion and quad activation  Long lever PB bridge 2x8  Standing heel/toe raises for WB and gait mechanics 2x10  Medial/lateral weight shift x10    Manual Therapy:    30 min  PROM knee flexion in prone/quad stretch  Assisted knee flexion in short sitting  STM to quad short sitting  Not today:  PROM knee flexion and ext  Assisted heel slides      Neuromuscular Re-education:  0 min  Not today   NMES x 10 QS and SLR   Mtrigger biofeedback 10/10 on/off   Quad set over towel roll with strap assist x5 min goal 200  Quad set in heel prop x5 min goal 400        Other: gait training; vaso   0; 15 min  Vaso medium cold temp mod compression x10 min in heel prop  Includes time for set up      Assessment:  Pt demonstrated significant limitation in quad flexibility this session, limiting knee flexion and noted discomfort in the quads and knee. Session mainly focused on quad and flexion mobility today, " so pt would benefit from increased extension focus in future sessions. Pt able to tolerate intervention but did note increased pain and guarding with motion, however guarding is still significantly improved compared to pre manipulation.      Plan:  STM, patellar mobility  Start with flexion work but prioritize extension    Consider BFR/active mobility to address guarding  Future visits: gait training in alter G, shuttle, squatting, LAQ variations    HEP: [id number]         Enriqueta Almonte, PT

## 2024-01-16 ENCOUNTER — TREATMENT (OUTPATIENT)
Dept: PHYSICAL THERAPY | Facility: HOSPITAL | Age: 24
End: 2024-01-16
Payer: COMMERCIAL

## 2024-01-16 DIAGNOSIS — S83.282A ACUTE LATERAL MENISCUS TEAR OF LEFT KNEE, INITIAL ENCOUNTER: ICD-10-CM

## 2024-01-16 DIAGNOSIS — Z47.89 ORTHOPEDIC AFTERCARE: Primary | ICD-10-CM

## 2024-01-16 DIAGNOSIS — S83.512A RUPTURE OF ANTERIOR CRUCIATE LIGAMENT OF LEFT KNEE, INITIAL ENCOUNTER: ICD-10-CM

## 2024-01-16 DIAGNOSIS — M25.469 KNEE SWELLING: ICD-10-CM

## 2024-01-16 DIAGNOSIS — M62.559 ATROPHY OF QUADRICEPS FEMORIS MUSCLE: ICD-10-CM

## 2024-01-16 DIAGNOSIS — M25.462 EFFUSION OF LEFT KNEE: ICD-10-CM

## 2024-01-16 DIAGNOSIS — R29.898 WEAKNESS OF LEFT LOWER EXTREMITY: ICD-10-CM

## 2024-01-16 DIAGNOSIS — R26.89 BALANCE PROBLEMS: ICD-10-CM

## 2024-01-16 DIAGNOSIS — M25.662 DECREASED RANGE OF MOTION OF LEFT KNEE: ICD-10-CM

## 2024-01-16 DIAGNOSIS — R26.2 DIFFICULTY WALKING: ICD-10-CM

## 2024-01-16 DIAGNOSIS — M25.562 ACUTE PAIN OF LEFT KNEE: ICD-10-CM

## 2024-01-16 PROCEDURE — 97110 THERAPEUTIC EXERCISES: CPT | Mod: GP

## 2024-01-16 PROCEDURE — 97140 MANUAL THERAPY 1/> REGIONS: CPT | Mod: GP

## 2024-01-16 PROCEDURE — 97016 VASOPNEUMATIC DEVICE THERAPY: CPT | Mod: GP

## 2024-01-16 NOTE — PROGRESS NOTES
Physical Therapy  Physical Therapy Orthopedic Progress Note    Patient Name: Diego Prasad  MRN: [id number]  Today's Date: 01/11/2024  Time Calculation  Start Time: 1005  Stop Time: 1142  Time Calculation (min): 97 min    Insurance:  Visit number: 6 of 30  Total visit count: 25  Authorization info: no auth  Insurance Type:  employee health plan; vaso ok $3500 deductible    General:  Reason for visit: L ACLR PBTP + BRANDEE/YARI 1/10/24  Referred by: Dr. Marquez    Current Problem  1. Orthopedic aftercare        2. Knee swelling        3. Difficulty walking        4. Balance problems        5. Atrophy of quadriceps femoris muscle        6. Acute pain of left knee        7. Decreased range of motion of left knee        8. Weakness of left lower extremity        9. Effusion of left knee        10. Rupture of anterior cruciate ligament of left knee, initial encounter        11. Acute lateral meniscus tear of left knee, initial encounter                            Precautions: WBAT       Medical History Form: Reviewed (scanned into chart)    Subjective:   Visit #6- Pt reports his knee is still uncomfortable, notices some discomfort in the back and front, has CPM set to 110. Asking about improving to 120 deg.    DOS: 11/17/23  BRANDEE/YARI: 1/10/24    Pain:     Location: L knee  Description: dull      Objective:  Patient presents to clinic with bilateral axillary crutches and hinged knee brace.    Surgical sight inspected: No signs of infection; Stitches intact and wound healing well.        ROM  Pre session ROM 5-31  Post session ROM 5-0-102 (with assistance)    Knee AROM (Degrees)      (R)  (L)  Flexion:   Pre session 35deg  Extension:   Can activate in +8deg, cannot do without prop      Knee PROM (Degrees)      (R)  (L)  Flexion:      Extension:   +8        Strength Testing  *No strength testing performed secondary to patient being post-op. Will be assessed at follow up visit.    Pt can perform quad set with  "inconsistent tetany.     Knee Edema Measurements:    Effusion present consistent with post op presentation      Patella Mobility: Moderately restricted superior, inferior, medial, and lateral patella mobility noted on left secondary to effusion and pain.    Palpation: less anterior knee tenderness, increased muscle tension throughout quads, hamstrings      Outcome Measures:        EDUCATION: home exercise program, plan of care, activity modifications, pain management, and injury pathology       Plan of care was developed with input and agreement by the patient      Treatment Performed:    Therapeutic Exercise:    40 min  Upright bike x10 min for knee flexion mobility- focus on retro revs (20x)  Standing knee flexion stretch with foot on step- 20 reps  Seated heel slides with strap- 5 minutes   Seated active knee flexion over EOB- 20 reps  Shuttle 20x2 5 sec holds lv4  Prone quad stretch 2x60\"  NMES for quad strength and mobility x15 reps  Russian 10/10 LAQ with HS curl  Extra time utilized for transitions/transfers due to patient discomfort  NP:  Heel slides  Reviewed quad sets  Weighted extension prop 3lb ankle weight x5 min 10\" on/off quad sets  HEP update and patient education on continued priorities of rehab, acceptable levels of soreness/symptoms, operative findings and safety of progressive motion work  LLLD flexion over table adding 3lb ankle weight 3x60\"  TKE red band 5\" holds x20  Assisted SLR 3x5  Shuttle DL level 3 for knee flexion and quad activation  Long lever PB bridge 2x8  Standing heel/toe raises for WB and gait mechanics 2x10  Medial/lateral weight shift x10    Manual Therapy:    35 min  PROM knee flexion in sitting with STM to quad with stick  Cupping to VMO with movement- 5 minutes  PROM knee flexion with gentle post tib glides- 5 min  Not today:  PROM knee flexion and ext  Assisted heel slides  Assisted knee flexion in short sitting  STM to quad short sitting      Neuromuscular Re-education:  0 " min  Not today   NMES x 10 QS and SLR   Mtrigger biofeedback 10/10 on/off   Quad set over towel roll with strap assist x5 min goal 200  Quad set in heel prop x5 min goal 400        Other: gait training; vaso   0; 15 min  Vaso medium cold temp mod compression x15 min in heel prop  Includes time for set up      Assessment:    Diego Prasad is progressing towards their goals as evidenced by compliance with HEP, improved knee flexion ROM. We worked extensively on knee flexion ROM today, using both manual and stretching, soft tissue, and muscle energy techniques. He was able to get to 108 degrees passively by end of session, significant guarding and cues needed to sit down in hip but able to get some improvement. I did educate him on the importance of continued consistency of working on both his knee flexion and extension over the course of the day, 1000s of reps of knee flexion over the course of the day and also reassured him that pain/discomfort do not equal harm. He will be back again tomorrow, discussed increasing the CPM to 120 degrees once he can achieve 110 degrees without compensation.   No adverse effects to today's session.  Patient would continue to benefit from skilled PT to address remaining functional, objective and subjective deficits to allow them to return to full independence with ADLs and iADLs.        Plan:  STM, patellar mobility  Start with flexion work but prioritize extension    Consider BFR/active mobility to address guarding  Future visits: gait training in alter G, shuttle, squatting, LAQ variations    HEP: [id number]         Trisha Nettles, PT

## 2024-01-17 ENCOUNTER — TREATMENT (OUTPATIENT)
Dept: PHYSICAL THERAPY | Facility: HOSPITAL | Age: 24
End: 2024-01-17
Payer: COMMERCIAL

## 2024-01-17 DIAGNOSIS — S83.512A RUPTURE OF ANTERIOR CRUCIATE LIGAMENT OF LEFT KNEE, INITIAL ENCOUNTER: ICD-10-CM

## 2024-01-17 DIAGNOSIS — R26.89 BALANCE PROBLEMS: ICD-10-CM

## 2024-01-17 DIAGNOSIS — M62.559 ATROPHY OF QUADRICEPS FEMORIS MUSCLE: ICD-10-CM

## 2024-01-17 DIAGNOSIS — M25.469 KNEE SWELLING: ICD-10-CM

## 2024-01-17 DIAGNOSIS — Z47.89 ORTHOPEDIC AFTERCARE: Primary | ICD-10-CM

## 2024-01-17 DIAGNOSIS — M25.662 DECREASED RANGE OF MOTION OF LEFT KNEE: ICD-10-CM

## 2024-01-17 DIAGNOSIS — R26.2 DIFFICULTY WALKING: ICD-10-CM

## 2024-01-17 DIAGNOSIS — S83.282A ACUTE LATERAL MENISCUS TEAR OF LEFT KNEE, INITIAL ENCOUNTER: ICD-10-CM

## 2024-01-17 DIAGNOSIS — M25.562 ACUTE PAIN OF LEFT KNEE: ICD-10-CM

## 2024-01-17 DIAGNOSIS — M25.462 EFFUSION OF LEFT KNEE: ICD-10-CM

## 2024-01-17 DIAGNOSIS — R29.898 WEAKNESS OF LEFT LOWER EXTREMITY: ICD-10-CM

## 2024-01-17 PROCEDURE — 97016 VASOPNEUMATIC DEVICE THERAPY: CPT | Mod: GP

## 2024-01-17 PROCEDURE — 97112 NEUROMUSCULAR REEDUCATION: CPT | Mod: GP

## 2024-01-17 PROCEDURE — 97140 MANUAL THERAPY 1/> REGIONS: CPT | Mod: GP

## 2024-01-17 PROCEDURE — 97110 THERAPEUTIC EXERCISES: CPT | Mod: GP

## 2024-01-17 NOTE — PROGRESS NOTES
Physical Therapy  Physical Therapy Orthopedic Progress Note    Patient Name: Diego Prasad  MRN: [id number]  Today's Date: 01/11/2024  Time Calculation  Start Time: 1004  Stop Time: 1200  Time Calculation (min): 116 min    Insurance:  Visit number: 7 of 30  Total visit count: 25  Authorization info: no auth  Insurance Type:  employee health plan; [employer] ok $3500 deductible    General:  Reason for visit: L ACLR PBTP + BRANDEE/YARI 1/10/24  Referred by: Dr. Marquez    Current Problem  1. Orthopedic aftercare        2. Knee swelling        3. Difficulty walking        4. Decreased range of motion of left knee        5. Atrophy of quadriceps femoris muscle        6. Acute pain of left knee        7. Effusion of left knee        8. Weakness of left lower extremity        9. Acute lateral meniscus tear of left knee, initial encounter        10. Rupture of anterior cruciate ligament of left knee, initial encounter        11. Balance problems                Precautions: WBAT       Medical History Form: Reviewed (scanned into chart)    Subjective:   Visit #7- Pt reports he set his CPM to 114 degrees yesterday, could tell he was compensating if he tried to go further. Rates pain as 2/10 otday.    DOS: 11/17/23  BRANDEE/YARI: 1/10/24    Pain:     Location: L knee  Description: dull      Objective:  Patient presents to clinic with bilateral axillary crutches and hinged knee brace.    Surgical sight inspected: No signs of infection; Stitches intact and wound healing well.        ROM  Pre session ROM 6-93  Post session ROM 0-95         Strength Testing  *No strength testing performed secondary to patient being post-op. Will be assessed at follow up visit.    Pt can perform quad set with inconsistent tetany.     Knee Edema Measurements:    Effusion present consistent with post op presentation      Patella Mobility: Moderately restricted superior, inferior, medial, and lateral patella mobility noted on left secondary to effusion and  "pain.    Palpation: less anterior knee tenderness, increased muscle tension throughout quads, hamstrings      Outcome Measures:        EDUCATION: home exercise program, plan of care, activity modifications, pain management, and injury pathology       Plan of care was developed with input and agreement by the patient      Treatment Performed:    Therapeutic Exercise:    50 min  Upright bike seat 6 x10 min for knee flexion mobility- focus on retro revs (20x)  Standing knee flexion stretch with foot on 8 inch step- 20 reps  supine heel slides with strap- 5 minutes   Seated active knee flexion over EOB- 20 reps  Heel prop with 5# weight above knee- 5 minutes  Quad set 20x5 sec hold (2x through)  Calf stretch with heel prop 4x30 sec hold  Heel prop for knee extension ROM (no wt above knee)-5 minutes    Not today:    Shuttle 20x2 5 sec holds lv4  Prone quad stretch 2x60\"  NMES for quad strength and mobility x15 reps  Russian 10/10 LAQ with HS curl  Extra time utilized for transitions/transfers due to patient discomfort  NP:  Heel slides  Reviewed quad sets  Weighted extension prop 3lb ankle weight x5 min 10\" on/off quad sets  HEP update and patient education on continued priorities of rehab, acceptable levels of soreness/symptoms, operative findings and safety of progressive motion work  LLLD flexion over table adding 3lb ankle weight 3x60\"  TKE red band 5\" holds x20  Assisted SLR 3x5  Shuttle DL level 3 for knee flexion and quad activation  Long lever PB bridge 2x8  Standing heel/toe raises for WB and gait mechanics 2x10  Medial/lateral weight shift x10    Manual Therapy:    25 min  PROM knee flexion in sitting with STM to quad with stick- 10 minutes  Knee ext overpressure- 10 minutes  Patellar mobilizations (sup/inf)-5 minutes  Not today:  PROM knee flexion and ext  Assisted heel slides  Assisted knee flexion in short sitting  STM to quad short sitting      Neuromuscular Re-education:  15 min  NMES x 15 minutes- seated " knee ext at 90 deg knee flexion with belt 75 bps, 10 on/50 off  Mtrigger biofeedback 10/10 on/off - not today  Quad set over towel roll with strap assist x5 min goal 200  Quad set in heel prop x5 min goal 400    Other: gait training; vaso   0; 10 min  Vaso medium cold temp mod compression x10 min in heel prop  Includes time for set up      Assessment:    Diego Prasad is progressing towards their goals as evidenced by compliance with HEP. He was able to progress his CPM machine from 110 degrees to 114 degrees. He comes in reporting 2/10 pain/discomfort. Still has a habit of not putting a lot of weight through his leg when walking, though he has been encouraged to put more weight through the leg at this point to improve quad activation/muscle activation.  Today's treatment was progressed by adding emphasis on quad activation, knee flexion and extension ROM, and use of both biofeedback and NMES for quad activation. He significantly struggled with quad activation, used biofeedback with quad sets today and he struggled to get over 200 ma of activation. Needed a lot of cues to engage the quad, also spent quite a bit of time performing manual mobilization of the patella to encourage superior glide. Discussed continued importance of emphasis on ROM- was struggling with quad activation and ext today so we did direct focus on knee extension today to restore 0 degrees of active knee extension. He had about 95 degrees knee flexion passively today. Will continue to focus on ROM, patellar mobility and quad activation moving forward.  Patient would continue to benefit from skilled PT to address remaining functional, objective and subjective deficits to allow them to return to full independence with ADLs and iADLs.          Plan:  STM, patellar mobility  Start with flexion work but prioritize extension    Consider BFR/active mobility to address guarding  Future visits: gait training in alter G, shuttle, squatting, LAQ  variations    HEP: [id number]         Trisha Nettles, PT

## 2024-01-18 ENCOUNTER — TREATMENT (OUTPATIENT)
Dept: PHYSICAL THERAPY | Facility: HOSPITAL | Age: 24
End: 2024-01-18
Payer: COMMERCIAL

## 2024-01-18 DIAGNOSIS — R26.2 DIFFICULTY WALKING: ICD-10-CM

## 2024-01-18 DIAGNOSIS — M25.462 EFFUSION OF LEFT KNEE: ICD-10-CM

## 2024-01-18 DIAGNOSIS — M25.562 ACUTE PAIN OF LEFT KNEE: ICD-10-CM

## 2024-01-18 DIAGNOSIS — Z47.89 ORTHOPEDIC AFTERCARE: Primary | ICD-10-CM

## 2024-01-18 DIAGNOSIS — M25.469 KNEE SWELLING: ICD-10-CM

## 2024-01-18 DIAGNOSIS — M25.662 DECREASED RANGE OF MOTION OF LEFT KNEE: ICD-10-CM

## 2024-01-18 DIAGNOSIS — M62.559 ATROPHY OF QUADRICEPS FEMORIS MUSCLE: ICD-10-CM

## 2024-01-18 PROCEDURE — 97110 THERAPEUTIC EXERCISES: CPT | Mod: GP

## 2024-01-18 PROCEDURE — 97112 NEUROMUSCULAR REEDUCATION: CPT | Mod: GP

## 2024-01-18 PROCEDURE — 97016 VASOPNEUMATIC DEVICE THERAPY: CPT | Mod: GP

## 2024-01-18 PROCEDURE — 97140 MANUAL THERAPY 1/> REGIONS: CPT | Mod: GP

## 2024-01-18 ASSESSMENT — PAIN SCALES - GENERAL: PAINLEVEL_OUTOF10: 0 - NO PAIN

## 2024-01-18 ASSESSMENT — PAIN - FUNCTIONAL ASSESSMENT: PAIN_FUNCTIONAL_ASSESSMENT: 0-10

## 2024-01-19 NOTE — PROGRESS NOTES
Physical Therapy  Physical Therapy Orthopedic Progress Note    Patient Name: Diego Prasad  MRN: [id number]  Today's Date: 01/18/2024       Insurance:  Visit number: 9 of 30  Total visit count: 29  Authorization info: no auth  Insurance Type:  employee health plan; [employer] ok $3500 deductible    General:  Reason for visit: L ACLR PBTP + BRANDEE/YARI 1/10/24  Referred by: Dr. Marquez    Current Problem  1. Orthopedic aftercare        2. Knee swelling        3. Difficulty walking        4. Decreased range of motion of left knee        5. Atrophy of quadriceps femoris muscle        6. Acute pain of left knee        7. Effusion of left knee        8. Weakness of left lower extremity        9. Balance problems                    Precautions: WBAT       Medical History Form: Reviewed (scanned into chart)    Subjective:   Pt reports his knee is doing well and he does not get much pain anymore. Takes his prescription pain meds before PT but otherwise feeling good.    DOS: 11/17/23  BRANDEE/YARI: 1/10/24    Pain:  Pain Assessment: 0-10  Pain Score: 0 - No pain  Location: L knee  Description: dull      Objective:  Patient presents to clinic with bilateral axillary crutches and hinged knee brace.    Surgical sight inspected: No signs of infection; Stitches intact and wound healing well.        ROM  Pre session ROM 5-70    Post session ROM 1-0-110 with heavy overpressure (passively +2)        Strength Testing  *No strength testing performed secondary to patient being post-op. Will be assessed at follow up visit.    Pt can perform quad set with inconsistent tetany.     Knee Edema Measurements:    Effusion present consistent with post op presentation      Patella Mobility: Moderately restricted superior, inferior, medial, and lateral patella mobility noted on left secondary to effusion and pain.    Palpation: less anterior knee tenderness, increased muscle tension throughout quads, hamstrings, popliteus      Outcome Measures:     "    EDUCATION: home exercise program, plan of care, activity modifications, pain management, and injury pathology       Plan of care was developed with input and agreement by the patient      Treatment Performed:    Therapeutic Exercise:    20 min  Upright bike seat 6 x8 min for knee flexion mobility- focus on retro revs (20x)  Assisted squat for ROM x20    Not today:  Weighted extension prop 3lb ankle weight x5 min 10\" on/off quad sets  Standing knee flexion stretch with foot on 8 inch step- 20 reps  Seated active knee flexion over EOB- 20 reps  Quad set 20x5 sec hold (2x through)  Calf stretch with heel prop 4x30 sec hold  Shuttle 20x2 5 sec holds lv4  Prone quad stretch 2x60\"  NMES for quad strength and mobility x15 reps  Russian 10/10 LAQ with HS curl  LLLD flexion over table adding 3lb ankle weight 3x60\"  TKE red band 5\" holds x20  Assisted SLR 3x5  Shuttle DL level 3 for knee flexion and quad activation  Long lever PB bridge 2x8  Standing heel/toe raises for WB and gait mechanics 2x10  Medial/lateral weight shift x10    Manual Therapy:    20 min  STM to distal hamstrings, popliteus, peripatellar region  Patellar mobs superior, inferior, medial, lateral  Not today:  PROM knee flexion in sitting with STM to quad with stick- 10 minutes  Knee ext overpressure- 10 minutes  Patellar mobilizations (sup/inf)-5 minutes  Taught self patellar mobs for home  PROM knee flexion and ext  Assisted heel slides  Assisted knee flexion in short sitting  STM to quad short sitting      Neuromuscular Re-education:  0 min  NMES x 15 minutes- seated knee ext at 90 deg knee flexion with belt 75 bps, 10 on/50 off (not today)  Mtrigger biofeedback 10/10 on/off  Alternation between quad set in heel prop and over towel roll x10 min    Other: gait training; vaso   0; 23 min  Vaso medium cold temp mod compression x10 min in heel prop  Includes time for set up  Gait training  Alter G ambulation 50% BW with external and verbal cues for WB  SL " "balance in alter G 50% BW 4x15\"  Trialed ambulation with single crutch - unable to perform yet      Assessment:    Pt has maintained ROM over the weekend, and demonstrated good closed chain knee flexion within session. Minimal improvements seen in extension during manual therapy also. Pt had significant difficulty with full WB on surgical extremity while walking, but able to demonstrate good control with static activities at 50% WB. Improvement in WB following education and practice of single leg balance and squats with 50% bodyweight over the surgical leg.          Plan:  Start with active knee flexion mobility (bike, squats, HS curls, consider BFR) and weightbearing training  Finish with extension mobility      HEP: [id number]         Enriqueta Almonte, PT  "

## 2024-01-22 ENCOUNTER — TREATMENT (OUTPATIENT)
Dept: PHYSICAL THERAPY | Facility: HOSPITAL | Age: 24
End: 2024-01-22
Payer: COMMERCIAL

## 2024-01-22 DIAGNOSIS — R29.898 WEAKNESS OF LEFT LOWER EXTREMITY: ICD-10-CM

## 2024-01-22 DIAGNOSIS — M25.562 ACUTE PAIN OF LEFT KNEE: ICD-10-CM

## 2024-01-22 DIAGNOSIS — M25.662 DECREASED RANGE OF MOTION OF LEFT KNEE: ICD-10-CM

## 2024-01-22 DIAGNOSIS — M62.559 ATROPHY OF QUADRICEPS FEMORIS MUSCLE: ICD-10-CM

## 2024-01-22 DIAGNOSIS — R26.2 DIFFICULTY WALKING: ICD-10-CM

## 2024-01-22 DIAGNOSIS — R26.89 BALANCE PROBLEMS: ICD-10-CM

## 2024-01-22 DIAGNOSIS — M25.462 EFFUSION OF LEFT KNEE: ICD-10-CM

## 2024-01-22 DIAGNOSIS — M25.469 KNEE SWELLING: ICD-10-CM

## 2024-01-22 DIAGNOSIS — Z47.89 ORTHOPEDIC AFTERCARE: Primary | ICD-10-CM

## 2024-01-22 PROCEDURE — 97116 GAIT TRAINING THERAPY: CPT | Mod: GP

## 2024-01-22 PROCEDURE — 97016 VASOPNEUMATIC DEVICE THERAPY: CPT | Mod: GP

## 2024-01-22 PROCEDURE — 97110 THERAPEUTIC EXERCISES: CPT | Mod: GP

## 2024-01-22 PROCEDURE — 97140 MANUAL THERAPY 1/> REGIONS: CPT | Mod: GP

## 2024-01-22 ASSESSMENT — PAIN SCALES - GENERAL: PAINLEVEL_OUTOF10: 0 - NO PAIN

## 2024-01-22 ASSESSMENT — PAIN - FUNCTIONAL ASSESSMENT: PAIN_FUNCTIONAL_ASSESSMENT: 0-10

## 2024-01-24 NOTE — PROGRESS NOTES
Physical Therapy  Physical Therapy Orthopedic Progress Note    Patient Name: Diego Prasad  MRN: [id number]  Today's Date: 01/25/2024       Insurance:  Visit number: 10 of 30  Total visit count: 30  Authorization info: no auth  Insurance Type:  employee health plan; [employer] ok $3500 deductible    General:  Reason for visit: L ACLR PBTP + BRANDEE/AYRI 1/10/24  Referred by: Dr. Marquez    Current Problem  1. Orthopedic aftercare        2. Knee swelling        3. Difficulty walking        4. Decreased range of motion of left knee        5. Atrophy of quadriceps femoris muscle        6. Acute pain of left knee        7. Lower extremity weakness        8. Balance problems                      Precautions: WBAT       Medical History Form: Reviewed (scanned into chart)    Subjective:   Pt reports his knee is generally feeling better. No issues or new events to report.    DOS: 11/17/23  BRANDEE/YARI: 1/10/24    Pain:  Pain Assessment: 0-10  Pain Score: 0 - No pain  Location: L knee  Description: dull      Objective:  Patient presents to clinic with bilateral axillary crutches and hinged knee brace.    Surgical sight inspected: No signs of infection; Stitches intact and wound healing well.        ROM  Post session knee flexion: 113    Previously:  Pre session ROM 5-70    Post session ROM 1-0-110 with heavy overpressure (passively +2)        Strength Testing  *No strength testing performed secondary to patient being post-op. Will be assessed at follow up visit.    Pt can perform quad set with inconsistent tetany.     Knee Edema Measurements:    Effusion present consistent with post op presentation      Patella Mobility: Moderately restricted superior, inferior, medial, and lateral patella mobility noted on left secondary to effusion and pain.    Palpation: less anterior knee tenderness, increased muscle tension throughout quads, hamstrings, popliteus      Outcome Measures:        EDUCATION: home exercise program, plan of care,  "activity modifications, pain management, and injury pathology       Plan of care was developed with input and agreement by the patient      Treatment Performed:    Therapeutic Exercise:    18 min  Upright bike seat 6 x8 min for knee flexion mobility- focus on retro revs (20x)  Assisted squat for ROM x20  Heel slides    Not today:  Weighted extension prop 3lb ankle weight x5 min 10\" on/off quad sets  Standing knee flexion stretch with foot on 8 inch step- 20 reps  Seated active knee flexion over EOB- 20 reps  Quad set 20x5 sec hold (2x through)  Calf stretch with heel prop 4x30 sec hold  Shuttle 20x2 5 sec holds lv4  Prone quad stretch 2x60\"  NMES for quad strength and mobility x15 reps  Russian 10/10 LAQ with HS curl  LLLD flexion over table adding 3lb ankle weight 3x60\"  TKE red band 5\" holds x20  Assisted SLR 3x5  Shuttle DL level 3 for knee flexion and quad activation  Long lever PB bridge 2x8  Standing heel/toe raises for WB and gait mechanics 2x10  Medial/lateral weight shift x10    Manual Therapy:    20 min  STM to distal hamstrings, popliteus, peripatellar region  Patellar mobs superior, inferior, medial, lateral  Not today:  PROM knee flexion in sitting with STM to quad with stick- 10 minutes  Knee ext overpressure- 10 minutes  Patellar mobilizations (sup/inf)-5 minutes  Taught self patellar mobs for home  PROM knee flexion and ext  Assisted heel slides  Assisted knee flexion in short sitting  STM to quad short sitting      Neuromuscular Re-education:  30 min  Medial lateral weight shift with UE support x20  Stride heel/toe raises L foot forward x20  Single crutch step through with contralateral rig support (decreasing) x20  NMES 10/10 Russian quad set in heel prop x15 reps  Not today:  NMES x 15 minutes- seated knee ext at 90 deg knee flexion with belt 75 bps, 10 on/50 off (not today)  Mtrigger biofeedback 10/10 on/off  Alternation between quad set in heel prop and over towel roll x10 min    Other: gait " "training; vaso   10; 0 min  Vaso medium cold temp mod compression x10 min in heel prop  Includes time for set up  Gait training  Alter G ambulation 50% BW with external and verbal cues for WB  SL balance in alter G 50% BW 4x15\"  Trialed ambulation with single crutch - unable to perform yet      Assessment:    Pt had difficulty with accepting full weight on surgical leg, but improvement noted throughout session with use of different variations for UE and contralateral leg support. Incrementally improving flexion noted since BRANDEE/YARI. Pt has had slight lack of extension recently, encouraged to work more frequently on extension throughout the day at home.           Plan:  Weightbearing training, quad activation and extension work    Pt to decrease visit frequency to 1x week due to insurance visit limitations      HEP: [id number]         Enriqueta Almonte, PT  "

## 2024-01-25 ENCOUNTER — TREATMENT (OUTPATIENT)
Dept: PHYSICAL THERAPY | Facility: HOSPITAL | Age: 24
End: 2024-01-25
Payer: COMMERCIAL

## 2024-01-25 DIAGNOSIS — M25.662 DECREASED RANGE OF MOTION OF LEFT KNEE: ICD-10-CM

## 2024-01-25 DIAGNOSIS — Z47.89 ORTHOPEDIC AFTERCARE: Primary | ICD-10-CM

## 2024-01-25 DIAGNOSIS — M25.562 ACUTE PAIN OF LEFT KNEE: ICD-10-CM

## 2024-01-25 DIAGNOSIS — M25.469 KNEE SWELLING: ICD-10-CM

## 2024-01-25 DIAGNOSIS — R29.898 LOWER EXTREMITY WEAKNESS: ICD-10-CM

## 2024-01-25 DIAGNOSIS — M62.559 ATROPHY OF QUADRICEPS FEMORIS MUSCLE: ICD-10-CM

## 2024-01-25 DIAGNOSIS — R26.2 DIFFICULTY WALKING: ICD-10-CM

## 2024-01-25 DIAGNOSIS — R26.89 BALANCE PROBLEMS: ICD-10-CM

## 2024-01-25 PROCEDURE — 97112 NEUROMUSCULAR REEDUCATION: CPT | Mod: GP

## 2024-01-25 PROCEDURE — 97110 THERAPEUTIC EXERCISES: CPT | Mod: GP

## 2024-01-25 PROCEDURE — 97016 VASOPNEUMATIC DEVICE THERAPY: CPT | Mod: GP

## 2024-01-25 PROCEDURE — 97140 MANUAL THERAPY 1/> REGIONS: CPT | Mod: GP

## 2024-01-25 ASSESSMENT — PAIN - FUNCTIONAL ASSESSMENT: PAIN_FUNCTIONAL_ASSESSMENT: 0-10

## 2024-01-25 ASSESSMENT — PAIN SCALES - GENERAL: PAINLEVEL_OUTOF10: 0 - NO PAIN

## 2024-01-29 ENCOUNTER — OFFICE VISIT (OUTPATIENT)
Dept: ORTHOPEDIC SURGERY | Facility: HOSPITAL | Age: 24
End: 2024-01-29
Payer: COMMERCIAL

## 2024-01-29 ENCOUNTER — TREATMENT (OUTPATIENT)
Dept: PHYSICAL THERAPY | Facility: HOSPITAL | Age: 24
End: 2024-01-29
Payer: COMMERCIAL

## 2024-01-29 DIAGNOSIS — R26.89 BALANCE PROBLEMS: ICD-10-CM

## 2024-01-29 DIAGNOSIS — M25.662 DECREASED RANGE OF MOTION OF LEFT KNEE: ICD-10-CM

## 2024-01-29 DIAGNOSIS — M62.559 ATROPHY OF QUADRICEPS FEMORIS MUSCLE: ICD-10-CM

## 2024-01-29 DIAGNOSIS — M24.662 ARTHROFIBROSIS OF KNEE JOINT, LEFT: Primary | ICD-10-CM

## 2024-01-29 DIAGNOSIS — R26.2 DIFFICULTY WALKING: ICD-10-CM

## 2024-01-29 DIAGNOSIS — R29.898 LOWER EXTREMITY WEAKNESS: ICD-10-CM

## 2024-01-29 DIAGNOSIS — Z47.89 ORTHOPEDIC AFTERCARE: Primary | ICD-10-CM

## 2024-01-29 DIAGNOSIS — M25.562 ACUTE PAIN OF LEFT KNEE: ICD-10-CM

## 2024-01-29 DIAGNOSIS — M25.469 KNEE SWELLING: ICD-10-CM

## 2024-01-29 PROCEDURE — 97112 NEUROMUSCULAR REEDUCATION: CPT | Mod: GP

## 2024-01-29 PROCEDURE — 1036F TOBACCO NON-USER: CPT | Performed by: ORTHOPAEDIC SURGERY

## 2024-01-29 PROCEDURE — 97140 MANUAL THERAPY 1/> REGIONS: CPT | Mod: GP

## 2024-01-29 PROCEDURE — 99024 POSTOP FOLLOW-UP VISIT: CPT | Performed by: ORTHOPAEDIC SURGERY

## 2024-01-29 PROCEDURE — 97110 THERAPEUTIC EXERCISES: CPT | Mod: GP

## 2024-01-29 ASSESSMENT — PAIN SCALES - GENERAL: PAINLEVEL_OUTOF10: 0 - NO PAIN

## 2024-01-29 ASSESSMENT — PAIN - FUNCTIONAL ASSESSMENT: PAIN_FUNCTIONAL_ASSESSMENT: 0-10

## 2024-01-29 NOTE — PROGRESS NOTES
Patient comes in s/p lysis of adhesions of knee.  Doing PT, talked with his physical therapist and his flexion ROM up to 120 degrees.     Physical Exam:  Left knee  : able to fully straighten knee, able to activate quad, ROM flexion 120, good patellar mobility, incisions healed and sutures removed, calf soft and supple, toes pink and warm with good capillary refill, pulses intact, limb swelling appropriate, wiggles toes    Assessment: s/p arthroscopic BRANDEE, ACL-R  Plan:   - continue PT  - finish up CPM machine Wednesday, continue to work on knee motion 3 or more times daily  - PT orders updated placed, continue ACL protocol, wean brace once good leg control  - fuv in 3 weeks for recheck  Patient's questions were answered in detail and they are agreeable to above plan.

## 2024-01-29 NOTE — PROGRESS NOTES
Physical Therapy  Physical Therapy Orthopedic Progress Note    Patient Name: Diego Prasad  MRN: [id number]  Today's Date: 01/29/2024       Insurance:  Visit number: 11 of 30  Total visit count: 31  Authorization info: no auth  Insurance Type:  employee health plan; [employer] ok $3500 deductible    General:  Reason for visit: L ACLR PBTP + BRANDEE/YARI 1/10/24  Referred by: Dr. Marquez    Current Problem  1. Orthopedic aftercare        2. Knee swelling        3. Difficulty walking        4. Decreased range of motion of left knee        5. Atrophy of quadriceps femoris muscle        6. Acute pain of left knee        7. Lower extremity weakness        8. Balance problems                        Precautions: WBAT       Medical History Form: Reviewed (scanned into chart)    Subjective:   Pt reports his knee is doing alright, not much pain and did not need to take any pain meds today. Increased the CPM to 120 which is the max setting, reports he will have it until Wednesday.    DOS: 11/17/23  BRANDEE/YARI: 1/10/24    Pain:  Pain Assessment: 0-10  Pain Score: 0 - No pain  Location: L knee  Description: dull      Objective:  Patient presents to clinic with bilateral axillary crutches and hinged knee brace.    Surgical sight inspected: No signs of infection; Stitches intact and wound healing well.        ROM  Post session knee flexion: 113    Previously:  Pre session ROM 5-70    Post session ROM 1-0-118 in short sitting        Strength Testing  *No strength testing performed secondary to patient being post-op. Will be assessed at follow up visit.    Pt can perform quad set with inconsistent tetany.     Knee Edema Measurements:    Effusion present consistent with post op presentation      Patella Mobility: Moderately restricted superior, inferior, medial, and lateral patella mobility noted on left secondary to effusion and pain.    Palpation: less anterior knee tenderness, increased muscle tension throughout quads, hamstrings,  "popliteus      Outcome Measures:        EDUCATION: home exercise program, plan of care, activity modifications, pain management, and injury pathology       Plan of care was developed with input and agreement by the patient      Treatment Performed:    Therapeutic Exercise:    27 min  Upright bike seat 6 x8 min for knee flexion mobility- focus on retro revs (20x)  AAROM short sitting knee flexion x10-15  TKE red band 5\" hold x20  Weighted extension prop 5lb ankle weight x5 min 10\" on/off quad sets  Ice to go      Not today:  Assisted squat for ROM x20  Heel slides  Standing knee flexion stretch with foot on 8 inch step- 20 reps  Seated active knee flexion over EOB- 20 reps  Quad set 20x5 sec hold (2x through)  Calf stretch with heel prop 4x30 sec hold  Shuttle 20x2 5 sec holds lv4  Prone quad stretch 2x60\"  NMES for quad strength and mobility x15 reps  Russian 10/10 LAQ with HS curl  Assisted SLR 3x5  Shuttle DL level 3 for knee flexion and quad activation  Long lever PB bridge 2x8  Standing heel/toe raises for WB and gait mechanics 2x10  Medial/lateral weight shift x10    Manual Therapy:    20 min  STM to distal hamstrings, popliteus, peripatellar region  Patellar mobs superior, inferior, medial, lateral  Not today:  PROM knee flexion in sitting with STM to quad with stick- 10 minutes  Knee ext overpressure- 10 minutes  Patellar mobilizations (sup/inf)-5 minutes  Taught self patellar mobs for home  PROM knee flexion and ext  Assisted heel slides  Assisted knee flexion in short sitting  STM to quad short sitting      Neuromuscular Re-education:  15 min    NMES 10/10 Russian quad set in heel prop 2x15 reps    Not today:  NMES x 15 minutes- seated knee ext at 90 deg knee flexion with belt 75 bps, 10 on/50 off (not today)  Mtrigger biofeedback 10/10 on/off  Alternation between quad set in heel prop and over towel roll x10 min  Medial lateral weight shift with UE support x20  Stride heel/toe raises L foot forward " "x20  Single crutch step through with contralateral rig support (decreasing) x20    Other: gait training; vaso   0; 0 min  Vaso medium cold temp mod compression x10 min in heel prop - not today  Includes time for set up  Gait training  Alter G ambulation 50% BW with external and verbal cues for WB  SL balance in alter G 50% BW 4x15\"  Trialed ambulation with single crutch - unable to perform yet      Assessment:    Pt has continued to make improvements in knee flexion mobility. Presents with lack of extension mobility, but this improves with manual intervention and exercises performed with extension prop. Had difficulty with quad activation and patellar mobility, but not more than is expected due to his post operative course.       Plan:  Continue extension work, patellar mobility emphasize quad activation, gait training    Pt to decrease visit frequency to 1x week due to insurance visit limitations      HEP: [id number]         Enriqueta Almonte, PT  "

## 2024-02-01 ENCOUNTER — APPOINTMENT (OUTPATIENT)
Dept: PHYSICAL THERAPY | Facility: HOSPITAL | Age: 24
End: 2024-02-01
Payer: COMMERCIAL

## 2024-02-05 ENCOUNTER — TREATMENT (OUTPATIENT)
Dept: PHYSICAL THERAPY | Facility: HOSPITAL | Age: 24
End: 2024-02-05
Payer: COMMERCIAL

## 2024-02-05 DIAGNOSIS — R26.89 BALANCE PROBLEMS: ICD-10-CM

## 2024-02-05 DIAGNOSIS — M62.559 ATROPHY OF QUADRICEPS FEMORIS MUSCLE: ICD-10-CM

## 2024-02-05 DIAGNOSIS — R29.898 LOWER EXTREMITY WEAKNESS: ICD-10-CM

## 2024-02-05 DIAGNOSIS — M25.662 DECREASED RANGE OF MOTION OF LEFT KNEE: ICD-10-CM

## 2024-02-05 DIAGNOSIS — M25.469 KNEE SWELLING: ICD-10-CM

## 2024-02-05 DIAGNOSIS — R26.2 DIFFICULTY WALKING: ICD-10-CM

## 2024-02-05 DIAGNOSIS — Z47.89 ORTHOPEDIC AFTERCARE: Primary | ICD-10-CM

## 2024-02-05 DIAGNOSIS — M25.562 ACUTE PAIN OF LEFT KNEE: ICD-10-CM

## 2024-02-05 PROCEDURE — 97110 THERAPEUTIC EXERCISES: CPT | Mod: GP

## 2024-02-05 PROCEDURE — 97140 MANUAL THERAPY 1/> REGIONS: CPT | Mod: GP

## 2024-02-05 PROCEDURE — 97112 NEUROMUSCULAR REEDUCATION: CPT | Mod: GP

## 2024-02-05 ASSESSMENT — PAIN SCALES - GENERAL: PAINLEVEL_OUTOF10: 2

## 2024-02-05 ASSESSMENT — PAIN - FUNCTIONAL ASSESSMENT: PAIN_FUNCTIONAL_ASSESSMENT: 0-10

## 2024-02-05 NOTE — PROGRESS NOTES
Physical Therapy  Physical Therapy Orthopedic Progress Note    Patient Name: Diego Prasad  MRN: [id number]  Today's Date: 02/05/2024       Insurance:  Visit number: 12 of 30  Total visit count: 32  Authorization info: no auth  Insurance Type:  employee health plan; vaso ok $3500 deductible    General:  Reason for visit: L ACLR PBTP + BRANDEE/YARI 1/10/24  Referred by: Dr. Marquez    Current Problem  1. Orthopedic aftercare        2. Knee swelling        3. Difficulty walking        4. Decreased range of motion of left knee        5. Atrophy of quadriceps femoris muscle        6. Acute pain of left knee        7. Lower extremity weakness        8. Balance problems                          Precautions: WBAT       Medical History Form: Reviewed (scanned into chart)    Subjective:   Pt reports his knee has been doing well, only has intermittent mild pain. Has been working on trying to put full weight on his surgical leg when walking but still finds it challenging.    DOS: 11/17/23  BRANDEE/YARI: 1/10/24    Pain:  Pain Assessment: 0-10  Pain Score: 2  Location: L knee  Description: dull      Objective:  Patient presents to clinic with bilateral axillary crutches and hinged knee brace.    Surgical sight inspected: No signs of infection; Stitches intact and wound healing well.        ROM  Post session knee flexion: 113    Previously:  Pre session ROM 5-70    Post session ROM 1-0-118 in short sitting        Strength Testing  *No strength testing performed secondary to patient being post-op. Will be assessed at follow up visit.    Pt can perform quad set with inconsistent tetany.     Knee Edema Measurements:    Effusion present consistent with post op presentation      Patella Mobility: Moderately restricted superior, inferior, medial, and lateral patella mobility noted on left secondary to effusion and pain.    Palpation: less anterior knee tenderness, increased muscle tension throughout quads, hamstrings,  "popliteus      Outcome Measures:        EDUCATION: home exercise program, plan of care, activity modifications, pain management, and injury pathology       Plan of care was developed with input and agreement by the patient      Treatment Performed:    Therapeutic Exercise:    35 min  Upright bike seat 6 x8 min for knee flexion mobility- focus on retro revs (20x)  AAROM short sitting knee flexion x10-15  Heel slides x15  BFR 60% occlusion 30-15-15-15  Bodyweight squat  TKE orange band  Weighted extension prop 5lb ankle weight x5 min 10\" on/off quad sets  Ice to go    Not today:  TKE red band 5\" hold x20  Assisted squat for ROM x20  Heel slides  Standing knee flexion stretch with foot on 8 inch step- 20 reps  Seated active knee flexion over EOB- 20 reps  Quad set 20x5 sec hold (2x through)  Calf stretch with heel prop 4x30 sec hold  Shuttle 20x2 5 sec holds lv4  Prone quad stretch 2x60\"  NMES for quad strength and mobility x15 reps  Russian 10/10 LAQ with HS curl  Assisted SLR 3x5  Shuttle DL level 3 for knee flexion and quad activation  Long lever PB bridge 2x8  Standing heel/toe raises for WB and gait mechanics 2x10  Medial/lateral weight shift x10    Manual Therapy:    20 min  STM to distal hamstrings, popliteus, peripatellar region  Patellar mobs superior, inferior, medial, lateral  Not today:  PROM knee flexion in sitting with STM to quad with stick- 10 minutes  Knee ext overpressure- 10 minutes  Patellar mobilizations (sup/inf)-5 minutes  Taught self patellar mobs for home  PROM knee flexion and ext  Assisted heel slides  Assisted knee flexion in short sitting  STM to quad short sitting      Neuromuscular Re-education:  40 min  Semi tandem, tandem balance  Kickstand single leg balance x4 rounds 15-30sec  NMES 10/10 [place]ian  LAQ x15 reps (with assist)  SLR x15 reps    Not today:  NMES x 15 minutes- seated knee ext at 90 deg knee flexion with belt 75 bps, 10 on/50 off (not today)  Mtrigger biofeedback 10/10 " "on/off  Alternation between quad set in heel prop and over towel roll x10 min  Medial lateral weight shift with UE support x20  Stride heel/toe raises L foot forward x20  Single crutch step through with contralateral rig support (decreasing) x20    Other: gait training; vaso   0; 0 min  Vaso medium cold temp mod compression x10 min in heel prop - not today  Includes time for set up  Gait training  Alter G ambulation 50% BW with external and verbal cues for WB  SL balance in alter G 50% BW 4x15\"  Trialed ambulation with single crutch - unable to perform yet      Assessment:    Pt demonstrated improvements in weightbearing ability on surgical extremity, but still very hesitant to weight bear especially during gait without assistive device/brace. Pt continues to demonstrate quad weakness consistent with his post operative course but tolerated the addition of BFR well without adverse effects. Has maintained ROM over the last week.       Plan:  Patellar mobility/STM, weightbearing work, quad activation, continue BFR    Pt to decrease visit frequency to 1x week due to insurance visit limitations      HEP: [id number]         Enriqueta Almonte, PT  "

## 2024-02-09 NOTE — PROGRESS NOTES
Physical Therapy  Physical Therapy Orthopedic Progress Note    Patient Name: Diego Prasad  MRN: [id number]  Today's Date: 02/12/2024       Insurance:  Visit number: 13 of 30  Total visit count: 33  Authorization info: no auth  Insurance Type:  employee health plan; vaso ok $3500 deductible    General:  Reason for visit: L ACLR PBTP + BRANDEE/YARI 1/10/24  Referred by: Dr. Marquez    Current Problem  1. Orthopedic aftercare        2. Knee swelling        3. Difficulty walking        4. Decreased range of motion of left knee        5. Atrophy of quadriceps femoris muscle        6. Acute pain of left knee        7. Lower extremity weakness        8. Balance problems                            Precautions: WBAT       Medical History Form: Reviewed (scanned into chart)    Subjective:   Pt reports knee is feeling alright, has been working on putting more weight on his leg and has been able to wean down to 1 crutch.     DOS: 11/17/23  BRANDEE/YARI: 1/10/24    Pain:  Pain Assessment: 0-10  Pain Score: 0 - No pain  Location: L knee  Description: dull      Objective:  Patient presents to clinic with single axillary crutch and hinged knee brace.    Surgical sight inspected: No signs of infection; Stitches intact and wound healing well.        ROM  Post session knee flexion: 113    Previously:  Pre session ROM 5-70    Post session ROM 1-0-118 in short sitting        Strength Testing  *No strength testing performed secondary to patient being post-op. Will be assessed at follow up visit.    Pt can perform quad set with inconsistent tetany.     Knee Edema Measurements:    Effusion present consistent with post op presentation      Patella Mobility: Moderately restricted superior, inferior, medial, and lateral patella mobility noted on left secondary to effusion and pain.    Palpation: less anterior knee tenderness, increased muscle tension throughout quads, hamstrings, popliteus      Outcome Measures:        EDUCATION: home  "exercise program, plan of care, activity modifications, pain management, and injury pathology       Plan of care was developed with input and agreement by the patient      Treatment Performed:    Therapeutic Exercise:    50 min  Upright bike seat 6 x8 min for knee flexion mobility  Prone HS curl assisted 3x10  BFR 60% occlusion 30-15-15-15  Bodyweight squat  TKE orange band  Heel slides x15  4\" step up 2x15 LLE (UE assist as needed)      Ice to go    Not today:  Weighted extension prop 5lb ankle weight x5 min 10\" on/off quad sets  TKE red band 5\" hold x20  Assisted squat for ROM x20  Heel slides  Standing knee flexion stretch with foot on 8 inch step- 20 reps  Seated active knee flexion over EOB- 20 reps  Quad set 20x5 sec hold (2x through)  Calf stretch with heel prop 4x30 sec hold  Shuttle 20x2 5 sec holds lv4  Prone quad stretch 2x60\"  NMES for quad strength and mobility x15 reps  Russian 10/10 LAQ with HS curl  Assisted SLR 3x5  Shuttle DL level 3 for knee flexion and quad activation  Long lever PB bridge 2x8  Standing heel/toe raises for WB and gait mechanics 2x10  Medial/lateral weight shift x10    Manual Therapy:    20 min  STM to distal hamstrings, popliteus, peripatellar region  Patellar mobs superior, inferior, medial, lateral  Not today:  PROM knee flexion in sitting with STM to quad with stick- 10 minutes  Knee ext overpressure- 10 minutes  Patellar mobilizations (sup/inf)-5 minutes  Taught self patellar mobs for home  PROM knee flexion and ext  Assisted heel slides  Assisted knee flexion in short sitting  STM to quad short sitting      Neuromuscular Re-education:  12 min  Mtrigger biofeedback goal 700  Quad set over towel roll x5min  SLR x5 min  Ambulation in clinic with focus on WB  Not today:  Semi tandem, tandem balance  Kickstand single leg balance x4 rounds 15-30sec  NMES 10/10 Macanese  LAQ x15 reps (with assist)  SLR x15 reps  NMES x 15 minutes- seated knee ext at 90 deg knee flexion with belt 75 " "bps, 10 on/50 off (not today)  Medial lateral weight shift with UE support x20  Stride heel/toe raises L foot forward x20  Single crutch step through with contralateral rig support (decreasing) x20    Other: gait training; vaso   0; 0 min  Vaso medium cold temp mod compression x10 min in heel prop - not today  Includes time for set up  Gait training  Alter G ambulation 50% BW with external and verbal cues for WB  SL balance in alter G 50% BW 4x15\"  Trialed ambulation with single crutch - unable to perform yet      Assessment:    Pt demonstrates improved quad activation this date, able to perform SLR with lag but without assistance (PT or NMES) for the first time; improvements in functional weightbearing also noted. Significant difficulty with antigravity knee flexion/hamstring activation noted, likely due to long period of time without full knee flexion. Improved active use following increased repetitions but patient would still benefit from increased focus in this area.      Plan:  Consider brace weaning; weightbearing and gait focus  Hamstring and quad strength/activation  Continue BFR as time allows    Pt to decrease visit frequency to 1x week due to insurance visit limitations      HEP: [id number]         Enriqueta Almonte, PT  "

## 2024-02-12 ENCOUNTER — TREATMENT (OUTPATIENT)
Dept: PHYSICAL THERAPY | Facility: HOSPITAL | Age: 24
End: 2024-02-12
Payer: COMMERCIAL

## 2024-02-12 DIAGNOSIS — M25.562 ACUTE PAIN OF LEFT KNEE: ICD-10-CM

## 2024-02-12 DIAGNOSIS — Z47.89 ORTHOPEDIC AFTERCARE: Primary | ICD-10-CM

## 2024-02-12 DIAGNOSIS — M25.662 DECREASED RANGE OF MOTION OF LEFT KNEE: ICD-10-CM

## 2024-02-12 DIAGNOSIS — M25.469 KNEE SWELLING: ICD-10-CM

## 2024-02-12 DIAGNOSIS — R26.2 DIFFICULTY WALKING: ICD-10-CM

## 2024-02-12 DIAGNOSIS — R26.89 BALANCE PROBLEMS: ICD-10-CM

## 2024-02-12 DIAGNOSIS — M62.559 ATROPHY OF QUADRICEPS FEMORIS MUSCLE: ICD-10-CM

## 2024-02-12 DIAGNOSIS — R29.898 LOWER EXTREMITY WEAKNESS: ICD-10-CM

## 2024-02-12 PROCEDURE — 97110 THERAPEUTIC EXERCISES: CPT | Mod: GP

## 2024-02-12 PROCEDURE — 97140 MANUAL THERAPY 1/> REGIONS: CPT | Mod: GP

## 2024-02-12 PROCEDURE — 97112 NEUROMUSCULAR REEDUCATION: CPT | Mod: GP

## 2024-02-12 ASSESSMENT — PAIN SCALES - GENERAL: PAINLEVEL_OUTOF10: 0 - NO PAIN

## 2024-02-12 ASSESSMENT — PAIN - FUNCTIONAL ASSESSMENT: PAIN_FUNCTIONAL_ASSESSMENT: 0-10

## 2024-02-19 ENCOUNTER — TREATMENT (OUTPATIENT)
Dept: PHYSICAL THERAPY | Facility: HOSPITAL | Age: 24
End: 2024-02-19
Payer: COMMERCIAL

## 2024-02-19 ENCOUNTER — OFFICE VISIT (OUTPATIENT)
Dept: ORTHOPEDIC SURGERY | Facility: HOSPITAL | Age: 24
End: 2024-02-19
Payer: COMMERCIAL

## 2024-02-19 DIAGNOSIS — M62.559 ATROPHY OF QUADRICEPS FEMORIS MUSCLE: ICD-10-CM

## 2024-02-19 DIAGNOSIS — M25.469 KNEE SWELLING: ICD-10-CM

## 2024-02-19 DIAGNOSIS — R26.2 DIFFICULTY WALKING: ICD-10-CM

## 2024-02-19 DIAGNOSIS — R26.89 BALANCE PROBLEMS: ICD-10-CM

## 2024-02-19 DIAGNOSIS — M25.562 ACUTE PAIN OF LEFT KNEE: ICD-10-CM

## 2024-02-19 DIAGNOSIS — M25.662 DECREASED RANGE OF MOTION OF LEFT KNEE: ICD-10-CM

## 2024-02-19 DIAGNOSIS — Z47.89 ORTHOPEDIC AFTERCARE: Primary | ICD-10-CM

## 2024-02-19 DIAGNOSIS — Z98.890 S/P LEFT KNEE ARTHROSCOPY: ICD-10-CM

## 2024-02-19 DIAGNOSIS — R29.898 LOWER EXTREMITY WEAKNESS: ICD-10-CM

## 2024-02-19 DIAGNOSIS — M24.662 ARTHROFIBROSIS OF KNEE JOINT, LEFT: Primary | ICD-10-CM

## 2024-02-19 PROCEDURE — 97140 MANUAL THERAPY 1/> REGIONS: CPT | Mod: GP

## 2024-02-19 PROCEDURE — 97110 THERAPEUTIC EXERCISES: CPT | Mod: GP

## 2024-02-19 PROCEDURE — 1036F TOBACCO NON-USER: CPT | Performed by: ORTHOPAEDIC SURGERY

## 2024-02-19 PROCEDURE — 99024 POSTOP FOLLOW-UP VISIT: CPT | Performed by: ORTHOPAEDIC SURGERY

## 2024-02-19 PROCEDURE — 97116 GAIT TRAINING THERAPY: CPT | Mod: GP

## 2024-02-19 ASSESSMENT — PAIN - FUNCTIONAL ASSESSMENT: PAIN_FUNCTIONAL_ASSESSMENT: 0-10

## 2024-02-19 ASSESSMENT — PAIN SCALES - GENERAL: PAINLEVEL_OUTOF10: 0 - NO PAIN

## 2024-02-19 NOTE — PROGRESS NOTES
Physical Therapy  Physical Therapy Orthopedic Progress Note    Patient Name: Diego Prasad  MRN: [id number]  Today's Date: 02/19/2024       Insurance:  Visit number: 14 of 30  Total visit count: 34  Authorization info: no auth  Insurance Type:  employee health plan; vaso ok $3500 deductible    General:  Reason for visit: L ACLR PBTP + BRANDEE/YARI 1/10/24  Referred by: Dr. Marquez    Current Problem  1. Orthopedic aftercare        2. Knee swelling        3. Difficulty walking        4. Decreased range of motion of left knee        5. Atrophy of quadriceps femoris muscle        6. Acute pain of left knee        7. Lower extremity weakness        8. Balance problems                              Precautions: WBAT       Medical History Form: Reviewed (scanned into chart)    Subjective:   Pt reports knee has felt stronger and has been able to take a few steps without his crutches. Pain is well controlled, has not really had any pain and has not had to use any medication.    DOS: 11/17/23  BRANDEE/YARI: 1/10/24    Pain:  Pain Assessment: 0-10  Pain Score: 0 - No pain  Location: L knee  Description: dull      Objective:  Patient presents to clinic with single axillary crutch and hinged knee brace.    Surgical sight inspected: No signs of infection; Stitches intact and wound healing well.        ROM    Knee flexion: 115 (active assisted)        Strength Testing  *No strength testing performed secondary to patient being post-op. Will be assessed at follow up visit.    Pt can perform quad set with inconsistent tetany.     Knee Edema Measurements:    Effusion present consistent with post op presentation      Patella Mobility: Moderately restricted superior, inferior, medial, and lateral patella mobility noted on left secondary to effusion and pain.    Palpation: less anterior knee tenderness, increased muscle tension throughout quads, hamstrings, popliteus      Outcome Measures:        EDUCATION: home exercise program, plan of  "care, activity modifications, pain management, and injury pathology       Plan of care was developed with input and agreement by the patient      Treatment Performed:    Therapeutic Exercise:    33 min  Upright bike seat 6 x8 min for knee flexion mobility  Prone HS curl active assisted x15  BFR 60% occlusion 30-15-15-15  Bodyweight squat  TKE orange band  Heel slides x15  Sit to stand single arm assist elevated table 3x10  DB RDL 15lbs 2x12    Not today:  4\" step up 2x15 LLE (UE assist as needed)  Weighted extension prop 5lb ankle weight x5 min 10\" on/off quad sets  TKE red band 5\" hold x20  Assisted squat for ROM x20  Heel slides  Standing knee flexion stretch with foot on 8 inch step- 20 reps  Seated active knee flexion over EOB- 20 reps  Quad set 20x5 sec hold (2x through)  Calf stretch with heel prop 4x30 sec hold  Shuttle 20x2 5 sec holds lv4  Prone quad stretch 2x60\"  NMES for quad strength and mobility x15 reps  Russian 10/10 LAQ with HS curl  Assisted SLR 3x5  Shuttle DL level 3 for knee flexion and quad activation  Long lever PB bridge 2x8  Standing heel/toe raises for WB and gait mechanics 2x10  Medial/lateral weight shift x10    Manual Therapy:    15 min  STM to distal hamstrings, popliteus, peripatellar region  Scar mobs  Patellar mobs superior, inferior, medial, lateral  Not today:  PROM knee flexion in sitting with STM to quad with stick- 10 minutes  Knee ext overpressure- 10 minutes  Patellar mobilizations (sup/inf)-5 minutes  Taught self patellar mobs for home  PROM knee flexion and ext  Assisted heel slides  Assisted knee flexion in short sitting  STM to quad short sitting      Neuromuscular Re-education:  0 min - not today  Mtrigger biofeedback goal 700  Quad set over towel roll x5min  SLR x5 min  Ambulation in clinic with focus on WB  Semi tandem, tandem balance  Kickstand single leg balance x4 rounds 15-30sec  NMES 10/10 [place]er  LAQ x15 reps (with assist)  SLR x15 reps  NMES x 15 minutes- seated " knee ext at 90 deg knee flexion with belt 75 bps, 10 on/50 off (not today)  Medial lateral weight shift with UE support x20  Stride heel/toe raises L foot forward x20  Single crutch step through with contralateral rig support (decreasing) x20    Other: gait training; vaso   15; 0 min  Vaso medium cold temp mod compression x10 min in heel prop - not today  Includes time for set up  Gait training  Ambulation in clinic with single crutch - cues for lifting heel/knee flexion  Ambulation in clinic with no device + contact guard for weight acceptance training      Assessment:    Pt has demonstrated significant improvements in weightbearing and gait this session. Able to ambulate without any assistive device in the clinic with contact guard, but still hesitant to fully WB on the surgical extremity. Able to safely ambulate with single crutch, and able to incorporate improved knee flexion with verbal cueing. Has maintained ROM gains over the past couple of weeks.      Plan:  Consider brace weaning; weightbearing and gait focus  Quad activation, end range open chain strength  Hamstring and quad strength/activation    Continue BFR as time allows  Pt to decrease visit frequency to 1x week due to insurance visit limitations      HEP: [id number]         Enriqueta Almonte, PT

## 2024-02-20 NOTE — PROGRESS NOTES
Patient comes in for pov on left knee.  He has been working with his PT Enriqueta and she notes that his strength and walking are improved.  He did PT today prior to our visit and knee flexion to about 115-120 degrees.  Pain improved.  Using brace and crutch.     Physical Exam:  Left knee  : incisions clean/dry intact, good patellar mobility, able to fully straighten knee, quad tone and girth improved, working on single leg raies, negative lachman, calf soft and supple, motor intact, gait improved    Assessment: s/p lysis of adhesions left knee 1/10/24, hx Acl reconstruction with BPB autograft  Plan:   - shortened hinged knee brace  - recommended that he get on stationary bike and work knee ROM and strength  - continue PT, updated rx  - fuv in 4-6 weeks  - encouraged him to continue working hard on his exercises several times a day,ROM and terminal extension  - Patient's questions were answered in detail and they are agreeable to above plan.

## 2024-02-26 ENCOUNTER — TREATMENT (OUTPATIENT)
Dept: PHYSICAL THERAPY | Facility: HOSPITAL | Age: 24
End: 2024-02-26
Payer: COMMERCIAL

## 2024-02-26 DIAGNOSIS — M25.462 EFFUSION OF LEFT KNEE: ICD-10-CM

## 2024-02-26 DIAGNOSIS — R26.2 DIFFICULTY WALKING: ICD-10-CM

## 2024-02-26 DIAGNOSIS — R29.898 LOWER EXTREMITY WEAKNESS: ICD-10-CM

## 2024-02-26 DIAGNOSIS — M25.469 KNEE SWELLING: ICD-10-CM

## 2024-02-26 DIAGNOSIS — Z47.89 ORTHOPEDIC AFTERCARE: Primary | ICD-10-CM

## 2024-02-26 DIAGNOSIS — R26.89 BALANCE PROBLEMS: ICD-10-CM

## 2024-02-26 DIAGNOSIS — M25.662 DECREASED RANGE OF MOTION OF LEFT KNEE: ICD-10-CM

## 2024-02-26 DIAGNOSIS — M62.559 ATROPHY OF QUADRICEPS FEMORIS MUSCLE: ICD-10-CM

## 2024-02-26 DIAGNOSIS — M25.562 ACUTE PAIN OF LEFT KNEE: ICD-10-CM

## 2024-02-26 PROCEDURE — 97110 THERAPEUTIC EXERCISES: CPT | Mod: GP

## 2024-02-26 PROCEDURE — 97140 MANUAL THERAPY 1/> REGIONS: CPT | Mod: GP

## 2024-02-26 PROCEDURE — 97016 VASOPNEUMATIC DEVICE THERAPY: CPT | Mod: GP

## 2024-02-26 ASSESSMENT — PAIN SCALES - GENERAL: PAINLEVEL_OUTOF10: 0 - NO PAIN

## 2024-02-26 ASSESSMENT — PAIN - FUNCTIONAL ASSESSMENT: PAIN_FUNCTIONAL_ASSESSMENT: 0-10

## 2024-02-26 NOTE — PROGRESS NOTES
Physical Therapy  Physical Therapy Orthopedic Progress Note    Patient Name: Diego Prasad  MRN: [id number]  Today's Date: 02/26/2024       Insurance:  Visit number: 15 of 30  Total visit count: 35  Authorization info: no auth  Insurance Type:  employee health plan; vaso ok $3500 deductible    General:  Reason for visit: L ACLR PBTP + BRANDEE/YARI 1/10/24  Referred by: Dr. Marquez    Current Problem  1. Orthopedic aftercare        2. Knee swelling        3. Difficulty walking        4. Decreased range of motion of left knee        5. Atrophy of quadriceps femoris muscle        6. Acute pain of left knee        7. Lower extremity weakness        8. Balance problems        9. Effusion of left knee                Precautions: WBAT       Medical History Form: Reviewed (scanned into chart)    Subjective:   Pt reports his knee is doing alright, still feels stiff when he bends it and notices it sometimes has to pop (nonpainful) before it loosens up and bends more. Still challenged by working on walking without a crutch. Had follow up with Dr. Marquez that went well, able to sleep without brace.    DOS: 11/17/23  BRANDEE/YARI: 1/10/24    Pain:  Pain Assessment: 0-10  Pain Score: 0 - No pain  Location: L knee  Description: dull      Objective:  Patient presents to clinic with single axillary crutch and hinged knee brace.    Surgical sight inspected: No signs of infection; Stitches intact and wound healing well.        ROM    Knee flexion: 125 (assisted - post session)  Between 1+ - 2+ effusion        Strength Testing  *No strength testing performed secondary to patient being post-op. Will be assessed at follow up visit.    Pt can perform quad set with inconsistent tetany.     Knee Edema Measurements:    Effusion present consistent with post op presentation      Mild restriction in all patellar mobility    Palpation: less anterior knee tenderness, increased muscle tension throughout quads, hamstrings, popliteus      Outcome  "Measures:        EDUCATION: home exercise program, plan of care, activity modifications, pain management, and injury pathology       Plan of care was developed with input and agreement by the patient      Treatment Performed:    Therapeutic Exercise:    49 min  Upright bike seat 6 x5 min for knee flexion mobility  Heel slides x15  Standing HS curl LLE 10\" hold 2x10  Step up 2x15 4\", 6\" step  BFR 70% occlusion 30-15-15-15  Bodyweight squat until 8min time out  TKE orange band      Not today:  Sit to stand single arm assist elevated table 3x10  DB RDL 15lbs 2x12  Prone HS curl active assisted x15  Weighted extension prop 5lb ankle weight x5 min 10\" on/off quad sets  TKE red band 5\" hold x20  Assisted squat for ROM x20  Heel slides  Standing knee flexion stretch with foot on 8 inch step- 20 reps  Seated active knee flexion over EOB- 20 reps  Quad set 20x5 sec hold (2x through)  Calf stretch with heel prop 4x30 sec hold  Shuttle 20x2 5 sec holds lv4  Prone quad stretch 2x60\"  NMES for quad strength and mobility x15 reps  Russian 10/10 LAQ with HS curl  Assisted SLR 3x5  Shuttle DL level 3 for knee flexion and quad activation  Long lever PB bridge 2x8  Standing heel/toe raises for WB and gait mechanics 2x10  Medial/lateral weight shift x10    Manual Therapy:    20 min  STM to distal hamstrings, popliteus, peripatellar region  Scar mobs  Patellar mobs superior, inferior, medial, lateral  Assisted flexion with 10\" holds  Not today:  PROM knee flexion in sitting with STM to quad with stick- 10 minutes  Knee ext overpressure- 10 minutes  PROM knee flexion and ext  Assisted heel slides  Assisted knee flexion in short sitting  STM to quad short sitting      Neuromuscular Re-education:  0 min - not today  Mtrigger biofeedback goal 700  Quad set over towel roll x5min  SLR x5 min  Ambulation in clinic with focus on WB  Semi tandem, tandem balance  Kickstand single leg balance x4 rounds 15-30sec  NMES 10/10 [place]  LAQ x15 reps " (with assist)  SLR x15 reps  NMES x 15 minutes- seated knee ext at 90 deg knee flexion with belt 75 bps, 10 on/50 off (not today)  Medial lateral weight shift with UE support x20  Stride heel/toe raises L foot forward x20  Single crutch step through with contralateral rig support (decreasing) x20    Other: gait training; vaso   10; 0 min  Vaso medium cold temp mod compression x10 min in heel prop - not today  Includes time for set up  Gait training  Ambulation in clinic with single crutch - cues for lifting heel/knee flexion  Ambulation in clinic with no device + contact guard for weight acceptance training      Assessment:    Pt demonstrated improvement in ROM this session: able to achieve highest flexion measurement and improved resting extension noted following STM/scar mobility. Still has difficulty with functional use of the LLE but able to perform full step ups with assist today and standing HS activation. Pt still lacks full active terminal knee extension and would benefit from continued focus in this area next session.      Plan:  Consider brace weaning; weightbearing and gait focus  Quad activation, end range open chain strength    Continue BFR as time allows  Pt to decrease visit frequency to 1x week due to insurance visit limitations      HEP: [id number]         Enriqueta Almonte, PT

## 2024-03-04 ENCOUNTER — TREATMENT (OUTPATIENT)
Dept: PHYSICAL THERAPY | Facility: HOSPITAL | Age: 24
End: 2024-03-04
Payer: COMMERCIAL

## 2024-03-04 DIAGNOSIS — R26.2 DIFFICULTY WALKING: ICD-10-CM

## 2024-03-04 DIAGNOSIS — M25.462 EFFUSION OF LEFT KNEE: ICD-10-CM

## 2024-03-04 DIAGNOSIS — R29.898 WEAKNESS OF LEFT LOWER EXTREMITY: ICD-10-CM

## 2024-03-04 DIAGNOSIS — R29.898 LOWER EXTREMITY WEAKNESS: ICD-10-CM

## 2024-03-04 DIAGNOSIS — Z47.89 ORTHOPEDIC AFTERCARE: Primary | ICD-10-CM

## 2024-03-04 DIAGNOSIS — M62.559 ATROPHY OF QUADRICEPS FEMORIS MUSCLE: ICD-10-CM

## 2024-03-04 DIAGNOSIS — M25.562 ACUTE PAIN OF LEFT KNEE: ICD-10-CM

## 2024-03-04 DIAGNOSIS — R26.89 BALANCE PROBLEMS: ICD-10-CM

## 2024-03-04 DIAGNOSIS — M25.662 DECREASED RANGE OF MOTION OF LEFT KNEE: ICD-10-CM

## 2024-03-04 DIAGNOSIS — M25.469 KNEE SWELLING: ICD-10-CM

## 2024-03-04 PROCEDURE — 97140 MANUAL THERAPY 1/> REGIONS: CPT | Mod: GP

## 2024-03-04 PROCEDURE — 97110 THERAPEUTIC EXERCISES: CPT | Mod: GP

## 2024-03-04 PROCEDURE — 97016 VASOPNEUMATIC DEVICE THERAPY: CPT | Mod: GP

## 2024-03-04 PROCEDURE — 97112 NEUROMUSCULAR REEDUCATION: CPT | Mod: GP

## 2024-03-04 ASSESSMENT — PAIN SCALES - GENERAL: PAINLEVEL_OUTOF10: 0 - NO PAIN

## 2024-03-04 ASSESSMENT — PAIN - FUNCTIONAL ASSESSMENT: PAIN_FUNCTIONAL_ASSESSMENT: 0-10

## 2024-03-04 NOTE — PROGRESS NOTES
Physical Therapy  Physical Therapy Orthopedic Progress Note    Patient Name: Diego Prasad  MRN: [id number]  Today's Date: 03/04/2024       Insurance:  Visit number: 17 of 30  Total visit count: 37  Authorization info: no auth  Insurance Type:  employee health plan; vaso ok $3500 deductible    General:  Reason for visit: L ACLR PBTP + BRANDEE/YARI 1/10/24  Referred by: Dr. Marquez    Current Problem  1. Orthopedic aftercare        2. Knee swelling        3. Difficulty walking        4. Decreased range of motion of left knee        5. Atrophy of quadriceps femoris muscle        6. Acute pain of left knee        7. Lower extremity weakness        8. Balance problems        9. Effusion of left knee        10. Weakness of left lower extremity                  Precautions: WBAT       Medical History Form: Reviewed (scanned into chart)    Subjective:   Pt reports no new functional changes in the knee, and has been able to wean himself off of using the crutch for short distances. Has been working on home exercises consistently, planning on trying to go to the gym this week.    DOS: 11/17/23  BRANDEE/YARI: 1/10/24    Pain:  Pain Assessment: 0-10  Pain Score: 0 - No pain  Location: L knee  Description: dull      Objective:  Patient presents to clinic with single axillary crutch and hinged knee brace.    Surgical sight inspected: No signs of infection; Stitches intact and wound healing well.        ROM    Knee flexion: 125 (assisted - post session)  Between 1+ - 2+ effusion        Strength Testing  *No strength testing performed secondary to patient being post-op. Will be assessed at follow up visit.    Pt can perform quad set with inconsistent tetany.     Knee Edema Measurements:    Effusion present consistent with post op presentation      Mild restriction in all patellar mobility    Palpation: less anterior knee tenderness, increased muscle tension throughout quads, hamstrings, popliteus      Outcome Measures:     "    EDUCATION: home exercise program, plan of care, activity modifications, pain management, and injury pathology       Plan of care was developed with input and agreement by the patient      Treatment Performed:    Therapeutic Exercise:    37 min  Upright bike seat 6 x5 min for knee flexion mobility  BFR 70% occlusion 30-15-15-15  BW squat with UE assist  15lb RDL   LAQ BW      Not today:  Heel slides x15  Standing HS curl LLE 10\" hold 2x10  Step up 2x15 4\", 6\" step  Sit to stand single arm assist elevated table 3x10  DB RDL 15lbs 2x12  Prone HS curl active assisted x15  Weighted extension prop 5lb ankle weight x5 min 10\" on/off quad sets  TKE red band 5\" hold x20  Assisted squat for ROM x20  Heel slides  Standing knee flexion stretch with foot on 8 inch step- 20 reps  Seated active knee flexion over EOB- 20 reps  Quad set 20x5 sec hold (2x through)  Calf stretch with heel prop 4x30 sec hold  Shuttle 20x2 5 sec holds lv4  Prone quad stretch 2x60\"  NMES for quad strength and mobility x15 reps  Russian 10/10 LAQ with HS curl  Assisted SLR 3x5  Shuttle DL level 3 for knee flexion and quad activation  Long lever PB bridge 2x8  Standing heel/toe raises for WB and gait mechanics 2x10  Medial/lateral weight shift x10    Manual Therapy:    20 min  STM to distal hamstrings, popliteus, peripatellar region  Scar mobs  Patellar mobs superior, inferior, medial, lateral  Assisted flexion with 10\" holds  Not today:  PROM knee flexion in sitting with STM to quad with stick- 10 minutes  Knee ext overpressure- 10 minutes  PROM knee flexion and ext  Assisted heel slides  Assisted knee flexion in short sitting  STM to quad short sitting      Neuromuscular Re-education:  10 min   Mtrigger biofeedback goal 700  Quad set over towel roll x5min  SLR x5 min  Not today:  NMES 10/10 Latvian  LAQ x15 reps (with assist)  SLR x15 reps  NMES x 15 minutes- seated knee ext at 90 deg knee flexion with belt 75 bps, 10 on/50 off (not today)  Medial " lateral weight shift with UE support x20  Stride heel/toe raises L foot forward x20  Single crutch step through with contralateral rig support (decreasing) x20    Other: gait training; vaso   10; 0 min  Vaso medium cold temp mod compression x10 min in heel prop   Includes time for set up  Gait training - not today  Ambulation in clinic with single crutch - cues for lifting heel/knee flexion  Ambulation in clinic with no device + contact guard for weight acceptance training      Assessment:    Pt demonstrates improvement of ROM maintenance between sessions, and improved scar mobility/tolerance noted this date. Gait pattern is still stiff and antalgic, but weightbearing confidence and push off is improved and pt has benefited from training gait without assistive devices. Able to tolerate BFR strength without pain, and pt would benefit from transition to traditional strength training to load the quads at highest capacity.      Plan:  Consider brace weaning; continue gait training  Traditional strength training and quad/hamstring activation (biofeedback/NMES as needed)      Pt to decrease visit frequency to 1x week due to insurance visit limitations      HEP: [id number]         Enriqueta Almonte, PT

## 2024-03-11 ENCOUNTER — TREATMENT (OUTPATIENT)
Dept: PHYSICAL THERAPY | Facility: HOSPITAL | Age: 24
End: 2024-03-11
Payer: COMMERCIAL

## 2024-03-11 DIAGNOSIS — M25.469 KNEE SWELLING: ICD-10-CM

## 2024-03-11 DIAGNOSIS — Z47.89 ORTHOPEDIC AFTERCARE: Primary | ICD-10-CM

## 2024-03-11 DIAGNOSIS — M62.559 ATROPHY OF QUADRICEPS FEMORIS MUSCLE: ICD-10-CM

## 2024-03-11 DIAGNOSIS — M25.662 DECREASED RANGE OF MOTION OF LEFT KNEE: ICD-10-CM

## 2024-03-11 DIAGNOSIS — R29.898 LOWER EXTREMITY WEAKNESS: ICD-10-CM

## 2024-03-11 DIAGNOSIS — M25.562 ACUTE PAIN OF LEFT KNEE: ICD-10-CM

## 2024-03-11 DIAGNOSIS — R26.89 BALANCE PROBLEMS: ICD-10-CM

## 2024-03-11 DIAGNOSIS — M25.462 EFFUSION OF LEFT KNEE: ICD-10-CM

## 2024-03-11 DIAGNOSIS — R26.2 DIFFICULTY WALKING: ICD-10-CM

## 2024-03-11 PROCEDURE — 97140 MANUAL THERAPY 1/> REGIONS: CPT | Mod: GP

## 2024-03-11 PROCEDURE — 97110 THERAPEUTIC EXERCISES: CPT | Mod: GP

## 2024-03-11 ASSESSMENT — PAIN - FUNCTIONAL ASSESSMENT: PAIN_FUNCTIONAL_ASSESSMENT: 0-10

## 2024-03-11 ASSESSMENT — PAIN SCALES - GENERAL: PAINLEVEL_OUTOF10: 0 - NO PAIN

## 2024-03-11 NOTE — PROGRESS NOTES
Physical Therapy  Physical Therapy Orthopedic Progress Note    Patient Name: Diego Prasad  MRN: [id number]  Today's Date: 03/11/2024       Insurance:  Visit number: 18 of 30  Total visit count: 38  Authorization info: no auth  Insurance Type:  employee health plan; vaso ok $3500 deductible    General:  Reason for visit: L ACLR PBTP + BRANDEE/YARI 1/10/24  Referred by: Dr. Marquez    Current Problem  1. Orthopedic aftercare        2. Knee swelling        3. Difficulty walking        4. Decreased range of motion of left knee        5. Atrophy of quadriceps femoris muscle        6. Acute pain of left knee        7. Lower extremity weakness        8. Balance problems        9. Effusion of left knee                    Precautions: WBAT       Medical History Form: Reviewed (scanned into chart)    Subjective:   Pt reports his knee continues to get better, was able to go to the gym last week and work on a few strength exercises. Has done well without crutches and is successfully weaning from brace use starting with shorter distance.    DOS: 11/17/23  BRANDEE/YARI: 1/10/24    Pain:  Pain Assessment: 0-10  Pain Score: 0 - No pain  Location: L knee  Description: dull      Objective:  Patient presents to clinic with single axillary crutch and hinged knee brace.    Surgical sight inspected: No signs of infection; Stitches intact and wound healing well.        ROM    Knee flexion: 126 (assisted - post session)  Trace effusion        Strength Testing  *No strength testing performed secondary to patient being post-op. Will be assessed at follow up visit.    Pt can perform quad set with inconsistent tetany.     Knee Edema Measurements:    Effusion present consistent with post op presentation      Mild restriction in all patellar mobility    Palpation: less anterior knee tenderness, increased muscle tension throughout quads, hamstrings, popliteus      Outcome Measures:        EDUCATION: home exercise program, plan of care, activity  "modifications, pain management, and injury pathology       Plan of care was developed with input and agreement by the patient      Treatment Performed:    Therapeutic Exercise:    54 min  Upright bike seat 6 x5 min for knee flexion mobility  Heel slides 5-10\" hold x10  Prone quad stretch 3x30\"  DL press sled only 5\" hold flexion 3x10  Prone HS curl 3-5# 3x12  4\" heel tap 3x10 r/l  Banded (blue rouge) side steps 3x5yds each way  Ice to go      Not today:  BFR 70% occlusion 30-15-15-15  BW squat with UE assist  15lb RDL   LAQ BW  Standing HS curl LLE 10\" hold 2x10  Step up 2x15 4\", 6\" step  Sit to stand single arm assist elevated table 3x10  DB RDL 15lbs 2x12  Prone HS curl active assisted x15  Weighted extension prop 5lb ankle weight x5 min 10\" on/off quad sets  TKE red band 5\" hold x20  Assisted squat for ROM x20  Heel slides  Standing knee flexion stretch with foot on 8 inch step- 20 reps  Seated active knee flexion over EOB- 20 reps  Quad set 20x5 sec hold (2x through)  Calf stretch with heel prop 4x30 sec hold  Shuttle 20x2 5 sec holds lv4  Prone quad stretch 2x60\"  NMES for quad strength and mobility x15 reps  Russian 10/10 LAQ with HS curl  Assisted SLR 3x5  Shuttle DL level 3 for knee flexion and quad activation  Long lever PB bridge 2x8  Standing heel/toe raises for WB and gait mechanics 2x10  Medial/lateral weight shift x10    Manual Therapy:    15 min  STM to distal hamstrings, popliteus, peripatellar region  Scar mobs  Patellar mobs superior, inferior, medial, lateral  Assisted flexion with 10\" holds x5  Not today:  PROM knee flexion in sitting with STM to quad with stick- 10 minutes  Knee ext overpressure- 10 minutes  PROM knee flexion and ext  Assisted heel slides  Assisted knee flexion in short sitting  STM to quad short sitting      Neuromuscular Re-education:  0 min - not today  Mtrigger biofeedback goal 700  Quad set over towel roll x5min  SLR x5 min  Not today:  NMES 10/10 Prydeinig  LAQ x15 reps " (with assist)  SLR x15 reps  NMES x 15 minutes- seated knee ext at 90 deg knee flexion with belt 75 bps, 10 on/50 off (not today)  Medial lateral weight shift with UE support x20  Stride heel/toe raises L foot forward x20  Single crutch step through with contralateral rig support (decreasing) x20    Other: gait training; vaso   0; 0 min  Vaso medium cold temp mod compression x10 min in heel prop   Includes time for set up  Gait training - not today  Ambulation in clinic with single crutch - cues for lifting heel/knee flexion  Ambulation in clinic with no device + contact guard for weight acceptance training      Assessment:    Pt demonstrates improved presession knee extension and has been able to maintain flexion ROM. Significantly challenged by quad exercises; required mild cueing for frontal plane knee alignment and allowing knees over toes but did well with external cue for the latter. Still demonstrates antalgic gait but improved knee flexion during swing noted.      Plan:  **Quad activation and end range extension strength; mobility  Continue strength training as time allows      Pt to decrease visit frequency to 1x week due to insurance visit limitations      HEP: [id number]         Enriqueta Almonte, PT

## 2024-03-18 ENCOUNTER — OFFICE VISIT (OUTPATIENT)
Dept: ORTHOPEDIC SURGERY | Facility: HOSPITAL | Age: 24
End: 2024-03-18
Payer: COMMERCIAL

## 2024-03-18 ENCOUNTER — TREATMENT (OUTPATIENT)
Dept: PHYSICAL THERAPY | Facility: HOSPITAL | Age: 24
End: 2024-03-18
Payer: COMMERCIAL

## 2024-03-18 DIAGNOSIS — R26.2 DIFFICULTY WALKING: ICD-10-CM

## 2024-03-18 DIAGNOSIS — R29.898 LOWER EXTREMITY WEAKNESS: ICD-10-CM

## 2024-03-18 DIAGNOSIS — M25.469 KNEE SWELLING: ICD-10-CM

## 2024-03-18 DIAGNOSIS — M24.662 ARTHROFIBROSIS OF KNEE JOINT, LEFT: Primary | ICD-10-CM

## 2024-03-18 DIAGNOSIS — M25.462 EFFUSION OF LEFT KNEE: ICD-10-CM

## 2024-03-18 DIAGNOSIS — S83.282A ACUTE LATERAL MENISCUS TEAR OF LEFT KNEE, INITIAL ENCOUNTER: ICD-10-CM

## 2024-03-18 DIAGNOSIS — S83.512A RUPTURE OF ANTERIOR CRUCIATE LIGAMENT OF LEFT KNEE, INITIAL ENCOUNTER: ICD-10-CM

## 2024-03-18 DIAGNOSIS — M25.562 ACUTE PAIN OF LEFT KNEE: ICD-10-CM

## 2024-03-18 DIAGNOSIS — M25.662 DECREASED RANGE OF MOTION OF LEFT KNEE: ICD-10-CM

## 2024-03-18 DIAGNOSIS — R26.89 BALANCE PROBLEMS: ICD-10-CM

## 2024-03-18 DIAGNOSIS — M62.559 ATROPHY OF QUADRICEPS FEMORIS MUSCLE: ICD-10-CM

## 2024-03-18 DIAGNOSIS — Z47.89 ORTHOPEDIC AFTERCARE: Primary | ICD-10-CM

## 2024-03-18 PROCEDURE — 99024 POSTOP FOLLOW-UP VISIT: CPT | Performed by: ORTHOPAEDIC SURGERY

## 2024-03-18 PROCEDURE — 97112 NEUROMUSCULAR REEDUCATION: CPT | Mod: GP

## 2024-03-18 PROCEDURE — 1036F TOBACCO NON-USER: CPT | Performed by: ORTHOPAEDIC SURGERY

## 2024-03-18 PROCEDURE — 97016 VASOPNEUMATIC DEVICE THERAPY: CPT | Mod: GP

## 2024-03-18 PROCEDURE — 97110 THERAPEUTIC EXERCISES: CPT | Mod: GP

## 2024-03-18 PROCEDURE — 97140 MANUAL THERAPY 1/> REGIONS: CPT | Mod: GP

## 2024-03-18 ASSESSMENT — PAIN - FUNCTIONAL ASSESSMENT: PAIN_FUNCTIONAL_ASSESSMENT: NO/DENIES PAIN

## 2024-03-18 NOTE — PROGRESS NOTES
Physical Therapy  Physical Therapy Orthopedic Progress Note    Patient Name: Diego Prasad  MRN: [id number]  Today's Date: 03/18/2024       Insurance:  Visit number: 19 of 30  Total visit count: 39  Authorization info: no auth  Insurance Type:  employee health plan; vaso ok $3500 deductible    General:  Reason for visit: L ACLR PBTP 11/17/23 + BRANDEE/YARI 1/10/24  Referred by: Dr. Marquez    Current Problem  1. Orthopedic aftercare        2. Knee swelling        3. Difficulty walking        4. Decreased range of motion of left knee        5. Atrophy of quadriceps femoris muscle        6. Acute pain of left knee        7. Lower extremity weakness        8. Balance problems        9. Effusion of left knee                      Precautions: WBAT       Medical History Form: Reviewed (scanned into chart)    Subjective:   Pt reports no issues in the knee. Has been able to go to the gym and do a little strength training.    DOS: 11/17/23  BRANDEE/YARI: 1/10/24    Pain:     Location: L knee  Description: dull      Objective:  Patient presents to clinic with single axillary crutch and hinged knee brace.    Surgical sight inspected: No signs of infection; Stitches intact and wound healing well.        ROM    Knee flexion: 126 (assisted - post session)  Trace effusion        Strength Testing  *No strength testing performed secondary to patient being post-op. Will be assessed at follow up visit.    Pt can perform quad set with inconsistent tetany.     Knee Edema Measurements:    Effusion present consistent with post op presentation      Mild restriction in all patellar mobility    Palpation: less anterior knee tenderness, increased muscle tension throughout quads, hamstrings, popliteus      Outcome Measures:        EDUCATION: home exercise program, plan of care, activity modifications, pain management, and injury pathology       Plan of care was developed with input and agreement by the patient      Treatment  "Performed:    Therapeutic Exercise:    37 min  Upright bike seat 6 x5 min for knee flexion mobility  BW squats + floss 2x10  Heel slides with/without assist  Prone quad stretch 3x30\"  DL squat x10, goblet squat 20lbs with pause 2x6  4\" heel tap 3x10 + foam roller cue  Not today:  DL press sled only 5\" hold flexion 3x10  Prone HS curl 3-5# 3x12  Banded (blue rouge) side steps 3x5yds each way  BFR 70% occlusion 30-15-15-15  BW squat with UE assist  15lb RDL   LAQ BW  Standing HS curl LLE 10\" hold 2x10  Step up 2x15 4\", 6\" step  Sit to stand single arm assist elevated table 3x10  DB RDL 15lbs 2x12  Prone HS curl active assisted x15  Weighted extension prop 5lb ankle weight x5 min 10\" on/off quad sets  TKE red band 5\" hold x20  Assisted squat for ROM x20  Heel slides  Standing knee flexion stretch with foot on 8 inch step- 20 reps  Seated active knee flexion over EOB- 20 reps  Quad set 20x5 sec hold (2x through)  Calf stretch with heel prop 4x30 sec hold  Shuttle 20x2 5 sec holds lv4  Prone quad stretch 2x60\"  NMES for quad strength and mobility x15 reps  Russian 10/10 LAQ with HS curl  Assisted SLR 3x5  Shuttle DL level 3 for knee flexion and quad activation  Long lever PB bridge 2x8  Standing heel/toe raises for WB and gait mechanics 2x10  Medial/lateral weight shift x10    Manual Therapy:    15 min  STM to distal hamstrings, popliteus, peripatellar region  Scar mobs  Patellar mobs superior, inferior, medial, lateral  Assisted flexion with 10\" holds x5  Not today:  PROM knee flexion in sitting with STM to quad with stick- 10 minutes  Knee ext overpressure- 10 minutes  PROM knee flexion and ext  Assisted heel slides  Assisted knee flexion in short sitting  STM to quad short sitting      Neuromuscular Re-education:  12 min   - Mtrigger biofeedback goal 800  Quad set over towel roll x6min  SLR x4 min  Includes set up time  Not today:  NMES 10/10 Kyrgyz  LAQ x15 reps (with assist)  SLR x15 reps  NMES x 15 minutes- seated " knee ext at 90 deg knee flexion with belt 75 bps, 10 on/50 off (not today)  Medial lateral weight shift with UE support x20  Stride heel/toe raises L foot forward x20  Single crutch step through with contralateral rig support (decreasing) x20    Other: gait training; vaso   0; 0 min  Vaso medium cold temp mod compression x10 min in heel prop   Includes time for set up  Gait training - not today  Ambulation in clinic with single crutch - cues for lifting heel/knee flexion  Ambulation in clinic with no device + contact guard for weight acceptance training      Assessment:    Pt demonstrated improved knee flexion to 130deg with overpressure following mobility work and use of floss bands. Good quad activation noted today, mild lag still present but able to activate quad in end range extension against gravity with use of biofeedback and achieved highest goal to date. Progressed traditional strength training without issue. Pt still demonstrates weakness and difficulty loading the quad symmetrically in closed chain exercises and would benefit from continued skilled PT to address these deficits.       Plan:  **Quad activation and end range extension strength; mobility; NMES/biofeedback  Objective strength test - isokinetic  Continue strength training as time allows      Pt to decrease visit frequency to 1x week due to insurance visit limitations      HEP: [id number]         Enriqueta Almonte, PT

## 2024-03-18 NOTE — PROGRESS NOTES
This is a pleasant 23 y.o. year old male who presents for fuv of  left knee.  He has been doing PT, ROM in flexion improved to 130 degrees .      PHYSICAL EXAMINATION  Constitutional Exam: patient's height and weight reviewed, well-kempt  Psychiatric Exam: alert and oriented x 3, appropriate mood and behavior  Eye Exam: CODY, EOMI  Pulmonary Exam: breathing non-labored, no apparent distress  Lymphatic exam: no appreciable lymphadenopathy in the lower extremities  Cardiovascular exam: DP pulses 2+ bilaterally, PT 2+ bilaterally, toes are pink with good capillary refill, no pitting edema  Skin exam: no open lesions, rashes, abrasions or ulcerations  Neurological exam: sensation to light touch intact in both lower extremities in peripheral and dermatomal distributions (except for any abnormalities noted in musculoskeletal exam)    Musculoskeletal exam: left knee: no effusion, full extension, stable knee ligamentous exam, ROM much improved full extension and flexion to 120-130 degrees    ASSESSMENT: s/p left knee scope with BRANDEE and manipulation 1/10/24, hx prior ACL-R with BPB auto  PLAN: Further treatment options discussed including continuing PT.   Discussed cardio training on bike and treadmill, use bike day after PT.  Discussed to be careful with knee extension machine, use leg press.  FUV in 6-8 weeks.  The patient's questions were answered in detail.      Note dictated with [employer] software, completed without full type editing to avoid delay.

## 2024-03-18 NOTE — PROGRESS NOTES
Patient comes in for pov on left knee.  He has been working with his PT Enriqueta and she notes that his strength and walking are improved.  He did PT today prior to our visit and knee flexion to about 130 degrees.  No pain.     Physical Exam:  Left knee  : incisions clean/dry intact, good patellar mobility, able to fully straighten knee and flexion to 130 degrees. good quad tone and girth improved, working on single leg raies, negative lachman, calf soft and supple, motor intact, gait improved    Assessment: s/p lysis of adhesions left knee 1/10/24, hx Acl reconstruction with BPB autograft  Plan:   - Patient doing well. Doing PT and HEP  - recommended that he get on stationary bike and work knee ROM and strength  - continue PT, updated rx  - fuv in 6 weeks  - encouraged him to continue working hard on his exercises several times a day,ROM and terminal extension  - Patient's questions were answered in detail and they are agreeable to above plan.

## 2024-03-25 ENCOUNTER — TREATMENT (OUTPATIENT)
Dept: PHYSICAL THERAPY | Facility: HOSPITAL | Age: 24
End: 2024-03-25
Payer: COMMERCIAL

## 2024-03-25 DIAGNOSIS — Z47.89 ORTHOPEDIC AFTERCARE: Primary | ICD-10-CM

## 2024-03-25 DIAGNOSIS — M25.662 DECREASED RANGE OF MOTION OF LEFT KNEE: ICD-10-CM

## 2024-03-25 DIAGNOSIS — R29.898 LOWER EXTREMITY WEAKNESS: ICD-10-CM

## 2024-03-25 DIAGNOSIS — M25.469 KNEE SWELLING: ICD-10-CM

## 2024-03-25 DIAGNOSIS — R26.2 DIFFICULTY WALKING: ICD-10-CM

## 2024-03-25 DIAGNOSIS — R26.89 BALANCE PROBLEMS: ICD-10-CM

## 2024-03-25 DIAGNOSIS — M62.559 ATROPHY OF QUADRICEPS FEMORIS MUSCLE: ICD-10-CM

## 2024-03-25 DIAGNOSIS — M25.562 ACUTE PAIN OF LEFT KNEE: ICD-10-CM

## 2024-03-25 PROCEDURE — 97112 NEUROMUSCULAR REEDUCATION: CPT | Mod: GP

## 2024-03-25 PROCEDURE — 97140 MANUAL THERAPY 1/> REGIONS: CPT | Mod: GP

## 2024-03-25 PROCEDURE — 97110 THERAPEUTIC EXERCISES: CPT | Mod: GP

## 2024-03-25 ASSESSMENT — PAIN SCALES - GENERAL: PAINLEVEL_OUTOF10: 0 - NO PAIN

## 2024-03-25 ASSESSMENT — PAIN - FUNCTIONAL ASSESSMENT: PAIN_FUNCTIONAL_ASSESSMENT: 0-10

## 2024-03-25 NOTE — PROGRESS NOTES
"  Physical Therapy  Physical Therapy Orthopedic Progress Note    Patient Name: Diego Prasad  MRN: [id number]  Today's Date: 03/25/2024       Insurance:  Visit number: 20 of 30  Total visit count: 40  Authorization info: no auth  Insurance Type:  employee health plan; vaso ok $3500 deductible    General:  Reason for visit: L ACLR PBTP 11/17/23 + BRANDEE/YARI 1/10/24  Referred by: Dr. Marquez    Current Problem  1. Orthopedic aftercare        2. Knee swelling        3. Difficulty walking        4. Decreased range of motion of left knee        5. Atrophy of quadriceps femoris muscle        6. Acute pain of left knee        7. Lower extremity weakness        8. Balance problems                        Precautions: WBAT       Medical History Form: Reviewed (scanned into chart)    Subjective:   Pt reports no changes in the knee. Had follow up with Dr. Marquez,     DOS: 11/17/23  BRANDEE/YARI: 1/10/24    Pain:  Pain Assessment: 0-10  Pain Score: 0 - No pain  Location: L knee  Description: dull      Objective:  Gait: mildly antalgic, with dec knee flexion, push off, and TKE    Surgical sight inspected: No signs of infection; wound healing well.        ROM    Knee flexion: 130 (assisted - post session)  Trace effusion        Strength Testing  MMT - Knee  Extension L: 4-/5  Flexion L: 3+/5      Mild restriction in all patellar mobility    Palpation: less anterior knee tenderness, increased muscle tension throughout quads, hamstrings, popliteus      Outcome Measures:        EDUCATION: home exercise program, plan of care, activity modifications, pain management, and injury pathology       Plan of care was developed with input and agreement by the patient      Treatment Performed:    Therapeutic Exercise:    60 min  Upright bike seat 6 x5 min for knee flexion mobility  BW squats + floss 2x10  Heel slides with self overpressure 3x30\"  SL HS curl 3xAMRAP (15, 15, ) 15-25-35  Static lunge 2-3x12 r/l with assist as needed  SL ext " "1xAMRAP 15# (15), 2x6-8 25#    Not today:  Prone quad stretch 3x30\"  DL squat x10, goblet squat 20lbs with pause 2x6  4\" heel tap 3x10 + foam roller cue  DL press sled only 5\" hold flexion 3x10  Prone HS curl 3-5# 3x12  Banded (blue rouge) side steps 3x5yds each way  BFR 70% occlusion 30-15-15-15  BW squat with UE assist  15lb RDL   LAQ BW  Standing HS curl LLE 10\" hold 2x10  Step up 2x15 4\", 6\" step  Sit to stand single arm assist elevated table 3x10  DB RDL 15lbs 2x12  Prone HS curl active assisted x15  Weighted extension prop 5lb ankle weight x5 min 10\" on/off quad sets  TKE red band 5\" hold x20  Assisted squat for ROM x20  Standing knee flexion stretch with foot on 8 inch step- 20 reps  Seated active knee flexion over EOB- 20 reps  Quad set 20x5 sec hold (2x through)  Calf stretch with heel prop 4x30 sec hold  Shuttle 20x2 5 sec holds lv4  Assisted SLR 3x5  Long lever PB bridge 2x8      Manual Therapy:    15 min    Scar mobs  Patellar mobs superior, inferior, medial, lateral  Assisted flexion  Not today:  PROM knee flexion in sitting with STM to quad with stick- 10 minutes  Knee ext overpressure- 10 minutes  PROM knee flexion and ext  Assisted heel slides  Assisted knee flexion in short sitting  STM to quad short sitting  STM to distal hamstrings, popliteus, peripatellar region       Neuromuscular Re-education:  20 min   - Mtrigger biofeedback goal 900  Quad set over towel roll x5min  SLR x3 min  Quad set in heel prop x3 min  Includes set up time  NMES 10/50 Ivorian  Isometric extension at 90 into band x15 min  Not today:  Medial lateral weight shift with UE support x20  Stride heel/toe raises L foot forward x20  Single crutch step through with contralateral rig support (decreasing) x20    Other: gait training; vaso   0; 0 min  Vaso medium cold temp mod compression x10 min in heel prop   Includes time for set up  Gait training - not today        Assessment:    Pt demonstrates improved quad activation today, able " to achieve a higher biofeedback goal and improved active knee extension range. Able to tolerate increased weights during traditional strength training, with significant shakiness in the quad that improved with increased reps. Noted mild pain with loading of quad in deeper closed chain flexion ranges, but it was not limiting to exercise completion.      Plan:  **Quad activation and end range extension strength; mobility; NMES/biofeedback  Traditional strength training  Objective strength test - isokinetic when functionally ready      Pt to decrease visit frequency to 1x week due to insurance visit limitations      HEP: [id number]         Enriqueta Almonte, PT

## 2024-03-28 NOTE — PROGRESS NOTES
Physical Therapy  Physical Therapy Orthopedic Progress Note    Patient Name: Diego Prasad  MRN: [id number]  Today's Date: 04/01/2024  Time Calculation  Start Time: 1102  Stop Time: 1228  Time Calculation (min): 86 min    Insurance:  Visit number: 21 of 30  Total visit count: 41  Authorization info: no auth  Insurance Type:  employee health plan; vaso ok $3500 deductible    General:  Reason for visit: L ACLR PBTP 11/17/23 + BRANDEE/YARI 1/10/24  Referred by: Dr. Marquez    Current Problem  1. Orthopedic aftercare        2. Knee swelling        3. Difficulty walking        4. Decreased range of motion of left knee        5. Atrophy of quadriceps femoris muscle        6. Acute pain of left knee        7. Lower extremity weakness        8. Balance problems                          Precautions: WBAT       Medical History Form: Reviewed (scanned into chart)    Subjective:   Pt reports improvement in walking pattern, has been working on active extension during heel strike. Was able to do some strength training at the gym this week.    DOS: 11/17/23  BRANDEE/YARI: 1/10/24    Pain:  Pain Assessment: 0-10  Pain Score: 0 - No pain  Location: L knee  Description: dull      Objective:  Gait: mildly antalgic, with dec knee flexion, push off, and TKE    Surgical sight inspected: No signs of infection; wound healing well.        ROM    Knee flexion: 134 (assisted - post session)  Trace effusion  Active extension to ~5deg  SLR with 0-1 deg lag        Strength Testing  MMT - Knee  Extension L: 4-/5  Flexion L: 3+/5      Mild restriction in all patellar mobility    Palpation: less anterior knee tenderness, increased muscle tension throughout quads, hamstrings, popliteus      Outcome Measures:        EDUCATION: home exercise program, plan of care, activity modifications, pain management, and injury pathology       Plan of care was developed with input and agreement by the patient      Treatment Performed:    Therapeutic Exercise:  "   36 min  Upright bike seat 6 x5 min for knee flexion mobility  Self gapping knee flexion stretch + floss 5\" holds x15  Quadruped rock back for knee flexion x15-20  Sled push/pull x10yds x4  HS DL slider curls 3x8  8\" step up 3x10-12 30-60lbs    Not today:  BW squats + floss 2x10  Heel slides with self overpressure 3x30\"  SL HS curl 3xAMRAP (15, 15, ) 15-25-35  Static lunge 2-3x12 r/l with assist as needed  SL ext 1xAMRAP 15# (15), 2x6-8 25#  Prone quad stretch 3x30\"  DL squat x10, goblet squat 20lbs with pause 2x6  4\" heel tap 3x10 + foam roller cue  DL press sled only 5\" hold flexion 3x10  Prone HS curl 3-5# 3x12  Banded (blue rouge) side steps 3x5yds each way  BFR 70% occlusion 30-15-15-15  BW squat with UE assist  15lb RDL   LAQ BW  Standing HS curl LLE 10\" hold 2x10  Step up 2x15 4\", 6\" step  Sit to stand single arm assist elevated table 3x10  DB RDL 15lbs 2x12  Prone HS curl active assisted x15  Weighted extension prop 5lb ankle weight x5 min 10\" on/off quad sets  TKE red band 5\" hold x20  Assisted squat for ROM x20  Standing knee flexion stretch with foot on 8 inch step- 20 reps  Seated active knee flexion over EOB- 20 reps  Quad set 20x5 sec hold (2x through)  Calf stretch with heel prop 4x30 sec hold  Shuttle 20x2 5 sec holds lv4  Assisted SLR 3x5  Long lever PB bridge 2x8      Manual Therapy:    15 min  Scar mobs  Tibial gapping  Not today:  PROM knee flexion in sitting with STM to quad with stick- 10 minutes  Knee ext overpressure- 10 minutes  PROM knee flexion and ext  Assisted heel slides  Assisted knee flexion in short sitting  STM to quad short sitting  STM to distal hamstrings, popliteus, peripatellar region   Patellar mobs superior, inferior, medial,  (not today)      Neuromuscular Re-education:  30 min   - Mtrigger biofeedback goal 1200  Neuromuscular deficit test  SLR x5 min  Trailblazer game x7  min  NMES 10/50 Russian  Isometric extension at 90 into band x15 min  Not today:  Medial lateral " "weight shift with UE support x20  Stride heel/toe raises L foot forward x20  Single crutch step through with contralateral rig support (decreasing) x20    Other: gait training; vaso   0; 0 min  Vaso medium cold temp mod compression x10 min in heel prop   Includes time for set up  Gait training - not today        Assessment:    Pt demonstrated improved active extension and improved knee flexion this date, able to activate to at least 5 deg of knee extension and able to achieve highest biofeedback ratings so far. Pt also demonstrates improved gait pattern with less antalgia and improved smoothness. Able to tolerate significantly higher challenge of weights/exercise difficulty this session, and did not have any limiting knee pain or other adverse effects.      Plan:  Warm up with biofeedback/flexion mobility   Isokinetic strength test  Quad/hamstring strength training      Pt to decrease visit frequency to 1x week due to insurance visit limitations      HEP: [id number]         Enriqueta Almonte, PT    Every day exercises:  1. Quad sets 10\" holds x20 - every 1-2 hours  2. Quad stretch  3. Quad set + straight leg raise 2x20  4. Heel taps 3x10  5. Heel slides/knee stretch over towel roll - 3-5x day    Strength exercises for gym - 3x week; pick 2-3 quad exercises, 2-3 other hamstring/other exercises  3-4 x 8-10 reps each  Quad exercises:  1. Leg press - double  2. Leg press - single  3. Lunge   4. Step up  5. Leg extension - single  6. Leg extension - double  7. Goblet squat  Hamstring/other exercises:  1. Single leg hamstring curl  2. RDL with weight  3. Single leg RDL  4. Single leg calf raise  5. Slider hamstring bridges or physioball hamstring curls  6. Any glute/core strength  "

## 2024-04-01 ENCOUNTER — TREATMENT (OUTPATIENT)
Dept: PHYSICAL THERAPY | Facility: HOSPITAL | Age: 24
End: 2024-04-01
Payer: COMMERCIAL

## 2024-04-01 DIAGNOSIS — R26.89 BALANCE PROBLEMS: ICD-10-CM

## 2024-04-01 DIAGNOSIS — M25.562 ACUTE PAIN OF LEFT KNEE: ICD-10-CM

## 2024-04-01 DIAGNOSIS — Z47.89 ORTHOPEDIC AFTERCARE: Primary | ICD-10-CM

## 2024-04-01 DIAGNOSIS — M25.662 DECREASED RANGE OF MOTION OF LEFT KNEE: ICD-10-CM

## 2024-04-01 DIAGNOSIS — R29.898 LOWER EXTREMITY WEAKNESS: ICD-10-CM

## 2024-04-01 DIAGNOSIS — M62.559 ATROPHY OF QUADRICEPS FEMORIS MUSCLE: ICD-10-CM

## 2024-04-01 DIAGNOSIS — R26.2 DIFFICULTY WALKING: ICD-10-CM

## 2024-04-01 DIAGNOSIS — M25.469 KNEE SWELLING: ICD-10-CM

## 2024-04-01 PROCEDURE — 97140 MANUAL THERAPY 1/> REGIONS: CPT | Mod: GP

## 2024-04-01 PROCEDURE — 97112 NEUROMUSCULAR REEDUCATION: CPT | Mod: GP

## 2024-04-01 PROCEDURE — 97110 THERAPEUTIC EXERCISES: CPT | Mod: GP

## 2024-04-01 ASSESSMENT — PAIN - FUNCTIONAL ASSESSMENT: PAIN_FUNCTIONAL_ASSESSMENT: 0-10

## 2024-04-01 ASSESSMENT — PAIN SCALES - GENERAL: PAINLEVEL_OUTOF10: 0 - NO PAIN

## 2024-04-17 NOTE — PROGRESS NOTES
"  Physical Therapy  Physical Therapy Orthopedic Progress Note    Patient Name: Diego Prasad  MRN: [id number]  Today's Date: 04/18/2024       Insurance:  Visit number: 22 of 30  Total visit count: 42  Authorization info: no auth  Insurance Type:  employee health plan; vaso ok $3500 deductible    General:  Reason for visit: L ACLR PBTP 11/17/23 + BRANDEE/YARI 1/10/24  Referred by: Dr. Marquez    Current Problem  1. Orthopedic aftercare        2. Knee swelling        3. Difficulty walking        4. Decreased range of motion of left knee        5. Atrophy of quadriceps femoris muscle        6. Acute pain of left knee        7. Lower extremity weakness        8. Balance problems        9. Effusion of left knee                              Precautions: WBAT       Medical History Form: Reviewed (scanned into chart)    Subjective:   ***    DOS: 11/17/23  BRANDEE/YARI: 1/10/24    Pain:     Location: L knee  Description: dull      Objective:  Gait: mildly antalgic, with dec knee flexion, push off, and TKE    Surgical sight inspected: No signs of infection; wound healing well.        ROM    Knee flexion: 134 (assisted - post session)  Trace effusion  Active extension to ~5deg  SLR with 0-1 deg lag        Strength Testing  MMT - Knee  Extension L: 4-/5  Flexion L: 3+/5      Mild restriction in all patellar mobility    Palpation: less anterior knee tenderness, increased muscle tension throughout quads, hamstrings, popliteus      Outcome Measures:        EDUCATION: home exercise program, plan of care, activity modifications, pain management, and injury pathology       Plan of care was developed with input and agreement by the patient      Treatment Performed:    Therapeutic Exercise:    36 min  Upright bike seat 6 x5 min for knee flexion mobility  Self gapping knee flexion stretch + floss 5\" holds x15  Quadruped rock back for knee flexion x15-20  Sled push/pull x10yds x4  HS DL slider curls 3x8  8\" step up 3x10-12 30-60lbs    Not " "today:  BW squats + floss 2x10  Heel slides with self overpressure 3x30\"  SL HS curl 3xAMRAP (15, 15, ) 15-25-35  Static lunge 2-3x12 r/l with assist as needed  SL ext 1xAMRAP 15# (15), 2x6-8 25#  Prone quad stretch 3x30\"  DL squat x10, goblet squat 20lbs with pause 2x6  4\" heel tap 3x10 + foam roller cue  DL press sled only 5\" hold flexion 3x10  Prone HS curl 3-5# 3x12  Banded (blue rouge) side steps 3x5yds each way  BFR 70% occlusion 30-15-15-15  BW squat with UE assist  15lb RDL   LAQ BW  Standing HS curl LLE 10\" hold 2x10  Step up 2x15 4\", 6\" step  Sit to stand single arm assist elevated table 3x10  DB RDL 15lbs 2x12  Prone HS curl active assisted x15  Weighted extension prop 5lb ankle weight x5 min 10\" on/off quad sets  TKE red band 5\" hold x20  Assisted squat for ROM x20  Standing knee flexion stretch with foot on 8 inch step- 20 reps  Seated active knee flexion over EOB- 20 reps  Quad set 20x5 sec hold (2x through)  Calf stretch with heel prop 4x30 sec hold  Shuttle 20x2 5 sec holds lv4  Assisted SLR 3x5  Long lever PB bridge 2x8      Manual Therapy:    15 min  Scar mobs  Tibial gapping  Not today:  PROM knee flexion in sitting with STM to quad with stick- 10 minutes  Knee ext overpressure- 10 minutes  PROM knee flexion and ext  Assisted heel slides  Assisted knee flexion in short sitting  STM to quad short sitting  STM to distal hamstrings, popliteus, peripatellar region   Patellar mobs superior, inferior, medial,  (not today)      Neuromuscular Re-education:  30 min   - Mtrigger biofeedback goal 1200  Neuromuscular deficit test  SLR x5 min  Trailblazer game x7  min  NMES 10/50 Russian  Isometric extension at 90 into band x15 min  Not today:  Medial lateral weight shift with UE support x20  Stride heel/toe raises L foot forward x20  Single crutch step through with contralateral rig support (decreasing) x20    Other: gait training; vaso   0; 0 min  Vaso medium cold temp mod compression x10 min in heel prop " "  Includes time for set up  Gait training - not today        Assessment:    ***      Plan:  Warm up with biofeedback/flexion mobility   Isokinetic strength test  Quad/hamstring strength training      Pt to decrease visit frequency to 1x week due to insurance visit limitations      HEP: [id number]         Enriqueta Almonte, PT    Every day exercises:  1. Quad sets 10\" holds x20 - every 1-2 hours  2. Quad stretch  3. Quad set + straight leg raise 2x20  4. Heel taps 3x10  5. Heel slides/knee stretch over towel roll - 3-5x day    Strength exercises for gym - 3x week; pick 2-3 quad exercises, 2-3 other hamstring/other exercises  3-4 x 8-10 reps each  Quad exercises:  1. Leg press - double  2. Leg press - single  3. Lunge   4. Step up  5. Leg extension - single  6. Leg extension - double  7. Goblet squat  Hamstring/other exercises:  1. Single leg hamstring curl  2. RDL with weight  3. Single leg RDL  4. Single leg calf raise  5. Slider hamstring bridges or physioball hamstring curls  6. Any glute/core strength  "

## 2024-04-18 ENCOUNTER — TREATMENT (OUTPATIENT)
Dept: PHYSICAL THERAPY | Facility: HOSPITAL | Age: 24
End: 2024-04-18
Payer: COMMERCIAL

## 2024-04-18 DIAGNOSIS — R26.2 DIFFICULTY WALKING: ICD-10-CM

## 2024-04-18 DIAGNOSIS — M25.469 KNEE SWELLING: ICD-10-CM

## 2024-04-18 DIAGNOSIS — M62.559 ATROPHY OF QUADRICEPS FEMORIS MUSCLE: ICD-10-CM

## 2024-04-18 DIAGNOSIS — Z47.89 ORTHOPEDIC AFTERCARE: Primary | ICD-10-CM

## 2024-04-18 DIAGNOSIS — R29.898 LOWER EXTREMITY WEAKNESS: ICD-10-CM

## 2024-04-18 DIAGNOSIS — M25.662 DECREASED RANGE OF MOTION OF LEFT KNEE: ICD-10-CM

## 2024-04-18 DIAGNOSIS — M25.562 ACUTE PAIN OF LEFT KNEE: ICD-10-CM

## 2024-04-18 DIAGNOSIS — M25.462 EFFUSION OF LEFT KNEE: ICD-10-CM

## 2024-04-18 DIAGNOSIS — R26.89 BALANCE PROBLEMS: ICD-10-CM

## 2024-04-18 PROCEDURE — 97016 VASOPNEUMATIC DEVICE THERAPY: CPT | Mod: GP

## 2024-04-18 PROCEDURE — 97140 MANUAL THERAPY 1/> REGIONS: CPT | Mod: GP

## 2024-04-18 PROCEDURE — 97110 THERAPEUTIC EXERCISES: CPT | Mod: GP

## 2024-04-18 ASSESSMENT — PAIN - FUNCTIONAL ASSESSMENT: PAIN_FUNCTIONAL_ASSESSMENT: 0-10

## 2024-04-18 ASSESSMENT — PAIN SCALES - GENERAL: PAINLEVEL_OUTOF10: 0 - NO PAIN

## 2024-04-18 NOTE — PROGRESS NOTES
Physical Therapy  Physical Therapy Orthopedic Progress Note    Patient Name: Diego Prasad  MRN: [id number]  Today's Date: 04/18/2024       Insurance:  Visit number: 22 of 30  Total visit count: 42  Authorization info: no auth  Insurance Type:  employee health plan; vaso ok $3500 deductible    General:  Reason for visit: L ACLR PBTP 11/17/23 + BRANDEE/YARI 1/10/24  Referred by: Dr. Marquez    Current Problem  1. Orthopedic aftercare        2. Knee swelling        3. Difficulty walking        4. Decreased range of motion of left knee        5. Atrophy of quadriceps femoris muscle        6. Acute pain of left knee        7. Lower extremity weakness        8. Balance problems        9. Effusion of left knee                              Precautions: WBAT       Medical History Form: Reviewed (scanned into chart)    Subjective:   Pt reports his knee is doing well and he feels like he is getting stronger. Has been consistent with going to the gym and working on leg extensions and leg curls. Notes a little knee stiffness after workouts but no pain or increase in swelling. Still has difficulty with end range flexion.    DOS: 11/17/23  BRANDEE/YARI: 1/10/24    Pain:  Pain Assessment: 0-10  Pain Score: 0 - No pain  Location: L knee  Description: dull      Objective:    Neuromuscular deficit test: 6%    Gait: mildly antalgic, with dec knee flexion, push off, and TKE    Surgical sight inspected: No signs of infection; wound healing well.        ROM    Knee flexion: 134 (assisted - post session)  Trace effusion  Active extension to ~5deg  SLR with 0-1 deg lag        Strength Testing  Isometric Strength Testing    Quads @ 60 deg knee flexion:  R: 164 (98 % BW)  L: 120 (71 % BW)  Deficit: 27%      HS @ 60 deg knee flexion:  R: 88 (52 % BW)  L: 65 (39 % BW)  Deficit: 26%      HS:Quad Ratio:  R: 54%  L: 54%      MMT - Knee  Extension L: 4-/5  Flexion L: 3+/5      Mild restriction in all patellar mobility    Palpation: less anterior  "knee tenderness, increased muscle tension throughout quads, hamstrings, popliteus      Outcome Measures:        EDUCATION: home exercise program, plan of care, activity modifications, pain management, and injury pathology       Plan of care was developed with input and agreement by the patient      Treatment Performed:    Therapeutic Exercise:    50 min  Elliptical x5 min warm up  Neuromuscular deficit test  Leg extension 2x10 self selected warm up weight  Leg curl 2x10 self selected warm up weight  Isokinetic testing  Heel slides + strap  Interval running on turf 1 length down/back x5 with 45+ sec rest periods  Reviewed self gapping knee flexion stretch  Pt education reviewing priorities for strength and mobility independently     Not today:  Self gapping knee flexion stretch + floss 5\" holds x15  Quadruped rock back for knee flexion x15-20  Sled push/pull x10yds x4  HS DL slider curls 3x8  8\" step up 3x10-12 30-60lbs  BW squats + floss 2x10  Heel slides with self overpressure 3x30\"  SL HS curl 3xAMRAP (15, 15, ) 15-25-35  Static lunge 2-3x12 r/l with assist as needed  SL ext 1xAMRAP 15# (15), 2x6-8 25#  Prone quad stretch 3x30\"  DL squat x10, goblet squat 20lbs with pause 2x6  4\" heel tap 3x10 + foam roller cue  DL press sled only 5\" hold flexion 3x10  Prone HS curl 3-5# 3x12  Banded (blue rouge) side steps 3x5yds each way  BFR 70% occlusion 30-15-15-15  BW squat with UE assist  15lb RDL   LAQ BW  Standing HS curl LLE 10\" hold 2x10  Step up 2x15 4\", 6\" step  Sit to stand single arm assist elevated table 3x10  DB RDL 15lbs 2x12  Prone HS curl active assisted x15  Weighted extension prop 5lb ankle weight x5 min 10\" on/off quad sets  TKE red band 5\" hold x20  Assisted squat for ROM x20  Standing knee flexion stretch with foot on 8 inch step- 20 reps  Seated active knee flexion over EOB- 20 reps  Quad set 20x5 sec hold (2x through)  Calf stretch with heel prop 4x30 sec hold  Shuttle 20x2 5 sec holds lv4  Assisted SLR " "3x5  Long lever PB bridge 2x8      Manual Therapy:    18 min  Scar mobs  Tibial gapping  STM to quad, HS, popliteus     Not today:  PROM knee flexion in sitting with STM to quad with stick- 10 minutes  Knee ext overpressure- 10 minutes  PROM knee flexion and ext  Assisted heel slides  Assisted knee flexion in short sitting  STM to quad short sitting  STM to distal hamstrings, popliteus, peripatellar region   Patellar mobs superior, inferior, medial,  (not today)      Neuromuscular Re-education:  0 min   - Mtrigger biofeedback goal 1200  Neuromuscular deficit test  SLR x5 min  Trailblazer game x7  min  NMES 10/50 Russian  Isometric extension at 90 into band x15 min  Medial lateral weight shift with UE support x20  Stride heel/toe raises L foot forward x20  Single crutch step through with contralateral rig support (decreasing) x20    Other: gait training; vaso   0; 10 min  Vaso medium cold temp mod compression x10 min in heel prop   Includes time for set up  Gait training - not today        Assessment:    Pt's isometric strength testing results reveal significant improvements in both quad and hamstring strength. 26-27% deficits allow pt to safely return to running and plyometric activities. Initiated light jogging during session today, with pt able to perform with mild antalgia and mild-moderate favoring of the surgical extremity. These deficits improved with repeated practice. No pain with initiating jogging this session. Pt educated on continued priorities for strength and mobility independently, with pt verbalizing understanding.      Plan:  Light plyo intro, single leg strength adding load  Consider issuing return to run program      Pt to decrease visit frequency to 1x week due to insurance visit limitations      HEP: [id number]         Enriqueta Almonte, PT    Every day exercises:  1. Quad sets 10\" holds x20 - every 1-2 hours  2. Quad stretch  3. Quad set + straight leg raise 2x20  4. Heel taps 3x10  5. Heel " slides/knee stretch over towel roll - 3-5x day    Strength exercises for gym - 3x week; pick 2-3 quad exercises, 2-3 other hamstring/other exercises  3-4 x 8-10 reps each  Quad exercises:  1. Leg press - double  2. Leg press - single  3. Lunge   4. Step up  5. Leg extension - single  6. Leg extension - double  7. Goblet squat  Hamstring/other exercises:  1. Single leg hamstring curl  2. RDL with weight  3. Single leg RDL  4. Single leg calf raise  5. Slider hamstring bridges or physioball hamstring curls  6. Any glute/core strength

## 2024-04-22 ENCOUNTER — TREATMENT (OUTPATIENT)
Dept: PHYSICAL THERAPY | Facility: HOSPITAL | Age: 24
End: 2024-04-22
Payer: COMMERCIAL

## 2024-04-22 DIAGNOSIS — M62.559 ATROPHY OF QUADRICEPS FEMORIS MUSCLE: ICD-10-CM

## 2024-04-22 DIAGNOSIS — M25.562 ACUTE PAIN OF LEFT KNEE: ICD-10-CM

## 2024-04-22 DIAGNOSIS — M25.662 DECREASED RANGE OF MOTION OF LEFT KNEE: ICD-10-CM

## 2024-04-22 DIAGNOSIS — M25.469 KNEE SWELLING: ICD-10-CM

## 2024-04-22 DIAGNOSIS — Z47.89 ORTHOPEDIC AFTERCARE: Primary | ICD-10-CM

## 2024-04-22 DIAGNOSIS — R26.2 DIFFICULTY WALKING: ICD-10-CM

## 2024-04-22 DIAGNOSIS — R29.898 LOWER EXTREMITY WEAKNESS: ICD-10-CM

## 2024-04-22 DIAGNOSIS — R26.89 BALANCE PROBLEMS: ICD-10-CM

## 2024-04-22 PROCEDURE — 97110 THERAPEUTIC EXERCISES: CPT | Mod: GP

## 2024-04-22 PROCEDURE — 97140 MANUAL THERAPY 1/> REGIONS: CPT | Mod: GP

## 2024-04-22 ASSESSMENT — PAIN SCALES - GENERAL: PAINLEVEL_OUTOF10: 0 - NO PAIN

## 2024-04-22 ASSESSMENT — PAIN - FUNCTIONAL ASSESSMENT: PAIN_FUNCTIONAL_ASSESSMENT: 0-10

## 2024-04-22 NOTE — PROGRESS NOTES
Physical Therapy  Physical Therapy Orthopedic Progress Note    Patient Name: Diego Prasad  MRN: [id number]  Today's Date: 04/22/2024       Insurance:  Visit number: 23 of 30  Total visit count: 43  Authorization info: no auth  Insurance Type:  employee health plan; vaso ok $3500 deductible    General:  Reason for visit: L ACLR PBTP 11/17/23 + BRANDEE/YARI 1/10/24  Referred by: Dr. Marquez    Current Problem  1. Orthopedic aftercare        2. Knee swelling        3. Decreased range of motion of left knee        4. Atrophy of quadriceps femoris muscle        5. Acute pain of left knee        6. Difficulty walking        7. Lower extremity weakness        8. Balance problems                                Precautions: WBAT       Medical History Form: Reviewed (scanned into chart)    Subjective:   Pt reports his knee is a little sore today because he spent a lot of time on his feet hiking over the weekend, but did not have any issues otherwise.     DOS: 11/17/23  BRANDEE/YARI: 1/10/24    Pain:  Pain Assessment: 0-10  Pain Score: 0 - No pain  Location: L knee  Description: dull      Objective:    Neuromuscular deficit test: 6%    Gait: mildly antalgic, with dec knee flexion, push off, and TKE    Surgical sight inspected: No signs of infection; wound healing well.        ROM    Knee flexion: 134 (assisted - post session)  Trace effusion  Active extension to ~5deg  SLR with 0-1 deg lag        Strength Testing  Isometric Strength Testing    Quads @ 60 deg knee flexion:  R: 164 (98 % BW)  L: 120 (71 % BW)  Deficit: 27%      HS @ 60 deg knee flexion:  R: 88 (52 % BW)  L: 65 (39 % BW)  Deficit: 26%      HS:Quad Ratio:  R: 54%  L: 54%      MMT - Knee  Extension L: 4-/5  Flexion L: 3+/5      Mild restriction in all patellar mobility    Palpation: less anterior knee tenderness, increased muscle tension throughout quads, hamstrings, popliteus      Outcome Measures:        EDUCATION: home exercise program, plan of care, activity  "modifications, pain management, and injury pathology       Plan of care was developed with input and agreement by the patient      Treatment Performed:    Therapeutic Exercise:    60 min  Elliptical x5 min warm up  Single leg press sled only with floss on L quad 2x20  Heel slides with strap  TRX deep bodyweight squat 3x10  RFESS bodyweight 3x10 r/l  HS DL slider curls 3x8  SL ext 4x4-6 at 35-45lbs   Interval running on turf 1 length down/back x1, pt education on return to run program, issued return to run program for independent completion  Ice to go    Not today:  Reviewed self gapping knee flexion stretch  Self gapping knee flexion stretch + floss 5\" holds x15  Quadruped rock back for knee flexion x15-20  Sled push/pull x10yds x4  8\" step up 3x10-12 30-60lbs  BW squats + floss 2x10  Heel slides with self overpressure 3x30\"  SL HS curl 3xAMRAP (15, 15, ) 15-25-35  Static lunge 2-3x12 r/l with assist as needed  SL ext 1xAMRAP 15# (15), 2x6-8 25#  Prone quad stretch 3x30\"  DL squat x10, goblet squat 20lbs with pause 2x6  4\" heel tap 3x10 + foam roller cue  DL press sled only 5\" hold flexion 3x10  Prone HS curl 3-5# 3x12  Banded (blue rouge) side steps 3x5yds each way  BFR 70% occlusion 30-15-15-15  BW squat with UE assist  15lb RDL   LAQ BW  Standing HS curl LLE 10\" hold 2x10  Step up 2x15 4\", 6\" step  Sit to stand single arm assist elevated table 3x10  DB RDL 15lbs 2x12  Prone HS curl active assisted x15  Weighted extension prop 5lb ankle weight x5 min 10\" on/off quad sets  TKE red band 5\" hold x20  Assisted squat for ROM x20  Standing knee flexion stretch with foot on 8 inch step- 20 reps  Seated active knee flexion over EOB- 20 reps  Quad set 20x5 sec hold (2x through)  Calf stretch with heel prop 4x30 sec hold  Shuttle 20x2 5 sec holds lv4  Assisted SLR 3x5  Long lever PB bridge 2x8      Manual Therapy:    10 min  Scar mobs  Tibial gapping  STM to quad, HS, popliteus   Assisted flexion    Not today:  PROM knee " "flexion in sitting with STM to quad with stick- 10 minutes  Knee ext overpressure- 10 minutes  PROM knee flexion and ext  Assisted heel slides  Assisted knee flexion in short sitting  STM to quad short sitting  STM to distal hamstrings, popliteus, peripatellar region   Patellar mobs superior, inferior, medial,  (not today)      Neuromuscular Re-education:  0 min   - Mtrigger biofeedback goal 1200  Neuromuscular deficit test  SLR x5 min  Trailblazer game x7  min  NMES 10/50 Russian  Isometric extension at 90 into band x15 min  Medial lateral weight shift with UE support x20  Stride heel/toe raises L foot forward x20  Single crutch step through with contralateral rig support (decreasing) x20    Other: gait training; vaso   0; 0 min  Vaso medium cold temp mod compression x10 min in heel prop   Includes time for set up  Gait training - not today        Assessment:    Pt continues to be able to access 134 deg of knee flexion, responds well to joint mobility strategies. Has difficulty with fully loading quad in knees over toes position, but improvement noted throughout session with practice and verbal cueing. Noted mild discomfort in the knee but no increase in pain. Pt education provided regarding monitoring knee soreness, effusion, and use of brace for uneven ground activities as needed, along with guidelines for initiating return to run program, and pt verbalized understanding. Pt still demonstrates mild limp and decreased force absorption on surgical leg, so educated to avoid continuing running with worsening rather than improvement of mechanics.       Plan:  Knee joint mobility for flexion  Light plyo intro (focus on landing and force absorption), continue anterior knee translation work  Monitor swelling, response to return to run program      Pt to decrease visit frequency to 1x week due to insurance visit limitations      HEP: [id number]         Enriqueta Almonte, PT    Every day exercises:  1. Quad sets 10\" holds x20 - " every 1-2 hours  2. Quad stretch  3. Quad set + straight leg raise 2x20  4. Heel taps 3x10  5. Heel slides/knee stretch over towel roll - 3-5x day    Strength exercises for gym - 3x week; pick 2-3 quad exercises, 2-3 other hamstring/other exercises  3-4 x 8-10 reps each  Quad exercises:  1. Leg press - double  2. Leg press - single  3. Lunge   4. Step up  5. Leg extension - single  6. Leg extension - double  7. Goblet squat  Hamstring/other exercises:  1. Single leg hamstring curl  2. RDL with weight  3. Single leg RDL  4. Single leg calf raise  5. Slider hamstring bridges or physioball hamstring curls  6. Any glute/core strength

## 2024-04-29 ENCOUNTER — OFFICE VISIT (OUTPATIENT)
Dept: ORTHOPEDIC SURGERY | Facility: HOSPITAL | Age: 24
End: 2024-04-29
Payer: COMMERCIAL

## 2024-04-29 ENCOUNTER — TREATMENT (OUTPATIENT)
Dept: PHYSICAL THERAPY | Facility: HOSPITAL | Age: 24
End: 2024-04-29
Payer: COMMERCIAL

## 2024-04-29 DIAGNOSIS — R26.89 BALANCE PROBLEMS: ICD-10-CM

## 2024-04-29 DIAGNOSIS — M62.559 ATROPHY OF QUADRICEPS FEMORIS MUSCLE: ICD-10-CM

## 2024-04-29 DIAGNOSIS — R26.2 DIFFICULTY WALKING: ICD-10-CM

## 2024-04-29 DIAGNOSIS — S83.512S RUPTURE OF ANTERIOR CRUCIATE LIGAMENT OF LEFT KNEE, SEQUELA: ICD-10-CM

## 2024-04-29 DIAGNOSIS — M24.662 ARTHROFIBROSIS OF KNEE JOINT, LEFT: ICD-10-CM

## 2024-04-29 DIAGNOSIS — R29.898 LOWER EXTREMITY WEAKNESS: ICD-10-CM

## 2024-04-29 DIAGNOSIS — M25.662 DECREASED RANGE OF MOTION OF LEFT KNEE: ICD-10-CM

## 2024-04-29 DIAGNOSIS — Z47.89 ORTHOPEDIC AFTERCARE: Primary | ICD-10-CM

## 2024-04-29 DIAGNOSIS — M25.562 ACUTE PAIN OF LEFT KNEE: ICD-10-CM

## 2024-04-29 DIAGNOSIS — M25.469 KNEE SWELLING: ICD-10-CM

## 2024-04-29 DIAGNOSIS — S83.282D ACUTE LATERAL MENISCUS TEAR OF LEFT KNEE, SUBSEQUENT ENCOUNTER: Primary | ICD-10-CM

## 2024-04-29 PROCEDURE — 99213 OFFICE O/P EST LOW 20 MIN: CPT | Performed by: ORTHOPAEDIC SURGERY

## 2024-04-29 PROCEDURE — 97016 VASOPNEUMATIC DEVICE THERAPY: CPT | Mod: GP

## 2024-04-29 PROCEDURE — 97140 MANUAL THERAPY 1/> REGIONS: CPT | Mod: GP

## 2024-04-29 PROCEDURE — 97110 THERAPEUTIC EXERCISES: CPT | Mod: GP

## 2024-04-29 ASSESSMENT — PAIN SCALES - GENERAL: PAINLEVEL_OUTOF10: 0 - NO PAIN

## 2024-04-29 ASSESSMENT — PAIN - FUNCTIONAL ASSESSMENT: PAIN_FUNCTIONAL_ASSESSMENT: 0-10

## 2024-04-29 NOTE — PROGRESS NOTES
This is a pleasant 23 y.o. year old male who presents for fuv of  left knee.  He has been doing PT, ROM in flexion improved to 135 degrees .  Start doing jogging. No issues. Just came from PT today    PHYSICAL EXAMINATION  Constitutional Exam: patient's height and weight reviewed, well-kempt  Psychiatric Exam: alert and oriented x 3, appropriate mood and behavior  Eye Exam: CODY, EOMI  Pulmonary Exam: breathing non-labored, no apparent distress  Lymphatic exam: no appreciable lymphadenopathy in the lower extremities  Cardiovascular exam: DP pulses 2+ bilaterally, PT 2+ bilaterally, toes are pink with good capillary refill, no pitting edema  Skin exam: no open lesions, rashes, abrasions or ulcerations  Neurological exam: sensation to light touch intact in both lower extremities in peripheral and dermatomal distributions (except for any abnormalities noted in musculoskeletal exam)    Musculoskeletal exam: left knee: no effusion, full extension, stable knee ligamentous exam, ROM much improved full extension and flexion to 135 degrees. Able to do single leg squat    ASSESSMENT: s/p left knee scope with BRANDEE and manipulation 1/10/24, hx prior ACL-R with BPB auto  PLAN: Further treatment options discussed including continuing PT.   Discussed cardio training on bike and treadmill, use bike day after PT.  The patient was given a prescription for physical therapy.  Physical therapy is medically necessary to improve strength, balance, range of motion and functional outcomes after injury and/or surgery. Patient was given a handout and instructed on an at home stretching program.  They should do these exercises 3 times per day for 6 weeks and then daily. Patient can use OTC Voltaren gel, biofreeze or  aspercream for pain and continue to ice and elevate supported at the calf to reduce swelling. All the patient's questions were answered. The patient agrees with the above plan.  Follow up as needed    I personally saw and  evaluated the patient. I personally obtained the key and critical portions of the history and physical exam or was physically present for key and critical portions performed by the Esvin ARANA-DAVID. I reviewed the PA-C 's documentation and discussed the patient with the PA-C. I agree with the PA-C's medical decision making as documented in the note.    Cammie Marquez MD

## 2024-05-06 ENCOUNTER — TREATMENT (OUTPATIENT)
Dept: PHYSICAL THERAPY | Facility: HOSPITAL | Age: 24
End: 2024-05-06
Payer: COMMERCIAL

## 2024-05-06 DIAGNOSIS — M25.662 DECREASED RANGE OF MOTION OF LEFT KNEE: ICD-10-CM

## 2024-05-06 DIAGNOSIS — M25.469 KNEE SWELLING: ICD-10-CM

## 2024-05-06 DIAGNOSIS — R26.2 DIFFICULTY WALKING: ICD-10-CM

## 2024-05-06 DIAGNOSIS — M25.562 ACUTE PAIN OF LEFT KNEE: ICD-10-CM

## 2024-05-06 DIAGNOSIS — Z47.89 ORTHOPEDIC AFTERCARE: Primary | ICD-10-CM

## 2024-05-06 DIAGNOSIS — R26.89 BALANCE PROBLEMS: ICD-10-CM

## 2024-05-06 DIAGNOSIS — M62.559 ATROPHY OF QUADRICEPS FEMORIS MUSCLE: ICD-10-CM

## 2024-05-06 DIAGNOSIS — R29.898 LOWER EXTREMITY WEAKNESS: ICD-10-CM

## 2024-05-06 PROCEDURE — 97016 VASOPNEUMATIC DEVICE THERAPY: CPT | Mod: GP

## 2024-05-06 PROCEDURE — 97110 THERAPEUTIC EXERCISES: CPT | Mod: GP

## 2024-05-06 ASSESSMENT — PAIN - FUNCTIONAL ASSESSMENT: PAIN_FUNCTIONAL_ASSESSMENT: 0-10

## 2024-05-06 ASSESSMENT — PAIN SCALES - GENERAL: PAINLEVEL_OUTOF10: 0 - NO PAIN

## 2024-05-06 NOTE — PROGRESS NOTES
Physical Therapy  Physical Therapy Orthopedic Progress Note    Patient Name: Diego Prasad  MRN: [id number]  Today's Date: 05/6/2024  Time Calculation  Start Time: 1050  Stop Time: 1224  Time Calculation (min): 94 min    Insurance:  Visit number: 25 of 30  Total visit count: 45  Authorization info: no auth  Insurance Type:  employee health plan; [employer]o ok $3500 deductible    General:  Reason for visit: L ACLR PBTP 11/17/23 + BRANDEE/YARI 1/10/24  Referred by: Dr. Marquez    Current Problem  1. Orthopedic aftercare        2. Knee swelling        3. Decreased range of motion of left knee        4. Atrophy of quadriceps femoris muscle        5. Acute pain of left knee        6. Difficulty walking        7. Lower extremity weakness        8. Balance problems                    Precautions: WBAT       Medical History Form: Reviewed (scanned into chart)    Subjective:   Pt reports his knee is doing well, has been continuing to strength train and work on mobility independently. Did a little running once on the treadmill and worked on some biking for endurance since last session.    DOS: 11/17/23  BRANDEE/YARI: 1/10/24    Pain:  Pain Assessment: 0-10  Pain Score: 0 - No pain  Location: L knee  Description: dull      Objective:    Neuromuscular deficit test: 6%    Gait: mildly antalgic, with dec knee flexion, push off, and TKE    Surgical sight inspected: No signs of infection; wound healing well.        ROM    Knee flexion: 136 (assisted - post session)  Trace effusion  Active extension to ~8deg          Strength Testing  Isometric Strength Testing    Quads @ 60 deg knee flexion:  R: 164 (98 % BW)  L: 120 (71 % BW)  Deficit: 27%      HS @ 60 deg knee flexion:  R: 88 (52 % BW)  L: 65 (39 % BW)  Deficit: 26%      HS:Quad Ratio:  R: 54%  L: 54%      MMT - Knee  Extension L: 4-/5  Flexion L: 3+/5      Mild restriction in all patellar mobility    Palpation: less anterior knee tenderness, increased muscle tension throughout quads,  "hamstrings, popliteus      Outcome Measures:        EDUCATION: home exercise program, plan of care, activity modifications, pain management, and injury pathology       Plan of care was developed with input and agreement by the patient      Treatment Performed:    Therapeutic Exercise:    74 min  Elliptical x5 min warm up  5/6/24  Floss band leg press x30  Floss TRX squats x30  Shuttle DL hops 3x20 level 5  Shuttle alt SL hops 3x20 level 5  shuttle single leg hops 2x10 each leg level 5  HS slider bridges 3x8-10  Slider SEBT practice 3x5 each direction r/l  Banded blue rouge side steps and monster walks [id number] each  SL ext 4x4-6 at 35-45lbs     Not today:  Foam roll quad  Heel slides with strap 10\" x10  Quadruped rock back for knee flexion x15-20  TRX deep bodyweight squat x20  Walking lunges 2x20 yds  Shuttle DL hops 3x20  Shuttle alt SL hops 3x20  RFESS bodyweight x10 r/l, 3x6 18-26lbs r/l  SL RDL 26lbs 3x6  HS DL slider curls 3x8  Single leg press sled only with floss on L quad 2x20  Self gapping knee flexion stretch + floss 5\" holds x15  Sled push/pull x10yds x4  8\" step up 3x10-12 30-60lbs  BW squats + floss 2x10  Heel slides with self overpressure 3x30\"  SL HS curl 3xAMRAP (15, 15, ) 15-25-35  Static lunge 2-3x12 r/l with assist as needed  SL ext 1xAMRAP 15# (15), 2x6-8 25#  Prone quad stretch 3x30\"  DL squat x10, goblet squat 20lbs with pause 2x6  4\" heel tap 3x10 + foam roller cue  DL press sled only 5\" hold flexion 3x10  Prone HS curl 3-5# 3x12  BFR 70% occlusion 30-15-15-15  BW squat with UE assist  15lb RDL   LAQ BW  Standing HS curl LLE 10\" hold 2x10  Step up 2x15 4\", 6\" step  Sit to stand single arm assist elevated table 3x10  DB RDL 15lbs 2x12  Prone HS curl active assisted x15  Weighted extension prop 5lb ankle weight x5 min 10\" on/off quad sets  TKE red band 5\" hold x20  Assisted squat for ROM x20  Standing knee flexion stretch with foot on 8 inch step- 20 reps  Seated active knee flexion over EOB- 20 " reps  Quad set 20x5 sec hold (2x through)  Calf stretch with heel prop 4x30 sec hold  Shuttle 20x2 5 sec holds lv4  Assisted SLR 3x5  Long lever PB bridge 2x8      Manual Therapy:    15 min  Scar mobs (not today)  Tibial gapping  STM to lateral quad   Assisted flexion  Reviewed self gapping knee flexion stretch  Not today:  PROM knee flexion in sitting with STM to quad with stick- 10 minutes  Knee ext overpressure- 10 minutes  PROM knee flexion and ext  Assisted heel slides  Assisted knee flexion in short sitting  STM to quad short sitting  STM to distal hamstrings, popliteus, peripatellar region   Patellar mobs superior, inferior, medial,  (not today)      Neuromuscular Re-education:  0 min   - Mtrigger biofeedback goal 1200  Neuromuscular deficit test  SLR x5 min  Trailblazer game x7  min  NMES 10/50 Russian  Isometric extension at 90 into band x15 min  Medial lateral weight shift with UE support x20  Stride heel/toe raises L foot forward x20  Single crutch step through with contralateral rig support (decreasing) x20    Other: vaso   15 min  Vaso medium cold temp mod compression x10 min in heel prop   Includes time for set up  Gait training - not today        Assessment:    Pt fatigued by session but tolerated interventions well. Good form noted on multidirectional lunges with improving anterior knee translation each session. Improved effusion noted this date, as well, along with maintenance of good active knee extension. Pt education provided on parameters for continuing introduction of independent return to run program, with pt verbalizing understanding.       Plan:  Start with knee joint mobility for flexion  Continue light plyos (focus on landing and force absorption), consider banded variations  Monitor swelling, response to return to run program  Continue functional strength training with anterior knee translation focus, progress weights    Isokinetic test end of May  Pt to decrease visit frequency to 1x  "week due to insurance visit limitations      HEP: [id number]     Part of this treatment session was assisted by MILES Tuttle. Their assistance was imperative in providing high quality, effective care. I provided oversight and direction on all aspects of patients care; plan of care was developed by myself. There were no concerns voiced by patient or therapy team during this treatment session.      Enriqueta Almonte, PT    Every day exercises:  1. Quad sets 10\" holds x20 - every 1-2 hours  2. Quad stretch  3. Quad set + straight leg raise 2x20  4. Heel taps 3x10  5. Heel slides/knee stretch over towel roll - 3-5x day    Strength exercises for gym - 3x week; pick 2-3 quad exercises, 2-3 other hamstring/other exercises  3-4 x 8-10 reps each  Quad exercises:  1. Leg press - double  2. Leg press - single  3. Lunge   4. Step up  5. Leg extension - single  6. Leg extension - double  7. Goblet squat  Hamstring/other exercises:  1. Single leg hamstring curl  2. RDL with weight  3. Single leg RDL  4. Single leg calf raise  5. Slider hamstring bridges or physioball hamstring curls  6. Any glute/core strength  "

## 2024-05-13 ENCOUNTER — TREATMENT (OUTPATIENT)
Dept: PHYSICAL THERAPY | Facility: HOSPITAL | Age: 24
End: 2024-05-13
Payer: COMMERCIAL

## 2024-05-13 DIAGNOSIS — M25.469 KNEE SWELLING: ICD-10-CM

## 2024-05-13 DIAGNOSIS — R26.2 DIFFICULTY WALKING: ICD-10-CM

## 2024-05-13 DIAGNOSIS — Z47.89 ORTHOPEDIC AFTERCARE: Primary | ICD-10-CM

## 2024-05-13 DIAGNOSIS — M62.559 ATROPHY OF QUADRICEPS FEMORIS MUSCLE: ICD-10-CM

## 2024-05-13 DIAGNOSIS — M25.562 ACUTE PAIN OF LEFT KNEE: ICD-10-CM

## 2024-05-13 DIAGNOSIS — M25.662 DECREASED RANGE OF MOTION OF LEFT KNEE: ICD-10-CM

## 2024-05-13 DIAGNOSIS — R26.89 BALANCE PROBLEMS: ICD-10-CM

## 2024-05-13 DIAGNOSIS — R29.898 LOWER EXTREMITY WEAKNESS: ICD-10-CM

## 2024-05-13 PROCEDURE — 97110 THERAPEUTIC EXERCISES: CPT | Mod: GP

## 2024-05-13 ASSESSMENT — PAIN SCALES - GENERAL: PAINLEVEL_OUTOF10: 0 - NO PAIN

## 2024-05-13 ASSESSMENT — PAIN - FUNCTIONAL ASSESSMENT: PAIN_FUNCTIONAL_ASSESSMENT: 0-10

## 2024-05-13 NOTE — PROGRESS NOTES
Physical Therapy  Physical Therapy Orthopedic Progress Note    Patient Name: Diego Prasad  MRN: [id number]  Today's Date: 05/13/2024  Time Calculation  Start Time: 1038  Stop Time: 1149  Time Calculation (min): 71 min    Insurance:  Visit number: 26 of 30  Total visit count: 46  Authorization info: no auth  Insurance Type:  employee health plan; [employer] ok $3500 deductible    General:  Reason for visit: L ACLR PBTP 11/17/23 + BRANDEE/YARI 1/10/24  Referred by: Dr. Marquez    Current Problem  1. Orthopedic aftercare        2. Knee swelling        3. Decreased range of motion of left knee        4. Atrophy of quadriceps femoris muscle        5. Acute pain of left knee        6. Difficulty walking        7. Lower extremity weakness        8. Balance problems                      Precautions: WBAT       Medical History Form: Reviewed (scanned into chart)    Subjective:   Pt reports knee is feeling pretty good, just a little stiff this morning. Did a little running on the track and didn't have any knee pain or notice increased swelling, just felt difficult to find a rhythm.     DOS: 11/17/23  BRANDEE/YARI: 1/10/24    Pain:  Pain Assessment: 0-10  Pain Score: 0 - No pain  Location: L knee  Description: dull      Objective:    Neuromuscular deficit test: 6%    Gait: unremarkable    Surgical sight inspected: No signs of infection; incisions all well healed        ROM    Knee flexion presession: 132  Sweep test: 0  Active extension to ~8deg          Strength Testing  Isokinetic Strength Testing (5/13/24) completed by Chante Zuleta ATC    Peak Torque Quads @ 60 deg/sec (goal for males: 100-125%, females: %)  R: 165 (98 % BW)  L: 88 (52 % BW)  Deficit: 47  LSI: 53%    Peak Torque HS @ 60 deg/sec (goals for males: 60% BW, females: 50%)  R: 93 (55 % BW)  L: 60 (36 % BW)  Deficit: 35   LSI: 65%    HS:Quad Ratio @ 60 deg/s (goals for males: 65%, females: 75%)  R: 56  L: 68    Peak Torque Quads @ 180 deg/sec  R: 127 (76 %  "BW)  L: 74 (44 % BW)  Deficit: 42  LSI: 58%    Peak Torque HS @ 180 deg/sec  R: 71 (42 % BW)  L: 52 (31 % BW)  Deficit: 27  LSI: 73%     HS:Quad Ratio @ 180 deg/sec  R: 56  L: 70      LE Y-Balance Test        Pass: No      Left Avg Right Avg Difference* LSI   Anterior 53 /   54   / 61 56 62 /   64   / 71   65.67 10cm 85.2%   Posteromedial 117 /   118   / 106   113.37 122 /   120   / 122   121.3 4cm 93.7%   Posterolateral 104 /   99   / 98   100.3 102 /   107   / 100   103 3cm 97.3%   3 trials, record maximal reach in each direction  *Difference should be less than 4cm for return to sport; <4 cm = pass      Isometric Strength Testing (4/18/24)    Quads @ 60 deg knee flexion:  R: 164 (98 % BW)  L: 120 (71 % BW)  Deficit: 27%      HS @ 60 deg knee flexion:  R: 88 (52 % BW)  L: 65 (39 % BW)  Deficit: 26%      HS:Quad Ratio:  R: 54%  L: 54%      Patellar mobility: WNL    Palpation: tenderness to deep palpation over lateral arthroscopy portal      Outcome Measures:        EDUCATION: home exercise program, plan of care, activity modifications, pain management, and injury pathology       Plan of care was developed with input and agreement by the patient      Treatment Performed:    Therapeutic Exercise:    66 min  Elliptical x5 min warm up  Heel tap 2x10 r/l  PB HS curl 2x10  RFESS warm up  Objective measures testing  DL squat jump purple band x30  Ice to go    Not today:  Foam roll quad  Heel slides with strap 10\" x10  Quadruped rock back for knee flexion x15-20  TRX deep bodyweight squat x20  Walking lunges 2x20 yds  Shuttle DL hops 3x20  Shuttle alt SL hops 3x20  RFESS bodyweight x10 r/l, 3x6 18-26lbs r/l  SL RDL 26lbs 3x6  HS DL slider curls 3x8  Single leg press sled only with floss on L quad 2x20  Self gapping knee flexion stretch + floss 5\" holds x15  Sled push/pull x10yds x4  8\" step up 3x10-12 30-60lbs  BW squats + floss 2x10  Heel slides with self overpressure 3x30\"  SL HS curl 3xAMRAP (15, 15, ) 15-25-35  Static " "lunge 2-3x12 r/l with assist as needed  SL ext 1xAMRAP 15# (15), 2x6-8 25#  Prone quad stretch 3x30\"  DL squat x10, goblet squat 20lbs with pause 2x6  4\" heel tap 3x10 + foam roller cue  DL press sled only 5\" hold flexion 3x10  Prone HS curl 3-5# 3x12  BFR 70% occlusion 30-15-15-15  BW squat with UE assist  15lb RDL   LAQ BW  Standing HS curl LLE 10\" hold 2x10  Step up 2x15 4\", 6\" step  Sit to stand single arm assist elevated table 3x10  DB RDL 15lbs 2x12  Prone HS curl active assisted x15  Weighted extension prop 5lb ankle weight x5 min 10\" on/off quad sets  TKE red band 5\" hold x20  Assisted squat for ROM x20  Standing knee flexion stretch with foot on 8 inch step- 20 reps  Seated active knee flexion over EOB- 20 reps  Quad set 20x5 sec hold (2x through)  Calf stretch with heel prop 4x30 sec hold  Shuttle 20x2 5 sec holds lv4  Assisted SLR 3x5  Long lever PB bridge 2x8      Manual Therapy:    0 min not today  Scar mobs (not today)  Tibial gapping  STM to lateral quad   Assisted flexion  Reviewed self gapping knee flexion stretch  Not today:  PROM knee flexion in sitting with STM to quad with stick- 10 minutes  Knee ext overpressure- 10 minutes  PROM knee flexion and ext  Assisted heel slides  Assisted knee flexion in short sitting  STM to quad short sitting  STM to distal hamstrings, popliteus, peripatellar region   Patellar mobs superior, inferior, medial,  (not today)      Neuromuscular Re-education:  0 min   - Mtrigger biofeedback goal 1200  Neuromuscular deficit test  SLR x5 min  Trailblazer game x7  min  NMES 10/50 Russian  Isometric extension at 90 into band x15 min  Medial lateral weight shift with UE support x20  Stride heel/toe raises L foot forward x20  Single crutch step through with contralateral rig support (decreasing) x20    Other: vaso   0 min - not today  Vaso medium cold temp mod compression x10 min in heel prop   Includes time for set up  Gait training - not today        Assessment:    Pt has " made good progress in knee extension and flexion ROM, swelling, and gait. He is able to perform some functional strength training with minimal reactive symptoms. At this time, the patient has not achieved full flexion symmetric to the other side and still exhibits strength (quadriceps and hamstrings) deficits in excess of 40% of the uninvolved leg, and balance deficits of anterior lower limb reach of 15% or 10cm, which puts him at increased risk of injury/re-injury based on current literature. These deficits put him at risk of re-injury to the ACL or injury to the contralateral extremity based on current literature. Additionally, he has not returned to his full prior level of function which includes high impact activities including squatting, running, jumping, and sporting activities. Functional movements (ie squatting, step down) still exhibit deficits, including hip drop and limited weightbearing single leg knee flexion, and asymmetry in squatting mechanics. He has been steadily making progress but has had a delay in progress due to requiring a second surgical procedure to address severe ROM limitations, which delayed his return to strength training and discharge off of an assistive device, and extends his expected timeline of recovery. Continued skilled physical therapy is medically necessary to continue to address these remaining deficits and restore the patient's full functional ability to his pre-injury and surgical level.      Active       PT Problem       PT Goal 1       Start:  10/16/23    Expected End:  02/12/24       Short Term Goals (To be met within 2-10 weeks):  A - 1. 10/16/2023 Pt will demo understanding of and compliance with HEP to progress towards long term goal.  A - 2. 10/16/2023 The patient will demonstrate full active knee extension equivalent to the uninvolved side to improve quality of gait, quad activation, and progression towards long term goals.  A - 3. 10/16/2023 Pt will demo ability to  perform 10 SLR without quad lag to progress towards long term goal.  A - 4. 10/16/2023 Pt will demo decreased swelling to +1 or less as assessed with stroke test to progress towards long term goals.  A - 5. 10/16/2023 Pt will demo normalized gait pattern with/without use of assistive device to progress towards long term goals    Goal Key:  A = achieved  PA = partially achieved  NA = not achieved  NT = not tested            PT Goal 2       Start:  10/16/23    Expected End:  08/20/24       Long Term Goals (12+ weeks and on)  A (delayed) - 1. 10/16/2023 The patient will demonstrate >70% LSI isometric quad strength assessed at 60 deg flexion compared to the uninvolved side at 12-16 weeks post op.  PA - 2. 10/16/2023 Pt will be independent and compliant with home program in order to safely return to sport.  NA - 3. 10/16/2023 Pt will increase LEFS outcome score to >90 pt's to demonstrate improved functional mobility for performance of ADL's and IADL's.  PA - 4. 10/16/2023 ROM: full, pain free knee ROM, symmetrical with the uninvolved limb in order to safely return to sport.  NA - 5. 10/16/2023 Strength: Isokinetic testing 90% or greater for hamstring and quad at 60º/sec and 300º/sec in order to safely return to sport.  NT - 6. 10/16/2023 Effusion: No reactive effusion >1+ with sport-specific activity in order to safely return to sport.  NT - 7. 10/16/2023 Functional Hop Testing: LSI 90% with appropriate mechanics and force attenuation strategies for all tests in order to safely return to sport.  NT - 8. 10/16/2023 Pt will score <17 on TSK-11 and score >77 on ACL-RSI to demonstrate appropriate psychological readiness for RTS without risk of reinjury.    Goal Key:  A = achieved  PA = partially achieved  NA = not achieved  NT = not tested     *Not all tested due to inability due to post op course and not having achieved functional ability               Plan:  Knee flexion mobility  Double leg plyometrics  Continue single  leg strength training   Continue to monitor effusion and return to run program    Re-isokinetic test in ~6 weeks, hop testing when functionally ready    Pt to decrease visit frequency to 1x week due to insurance visit limitations despite continued medical necessity of frequent intervention      HEP: [id number]     Part of this treatment session was assisted by MILES Tuttle. Their assistance was imperative in providing high quality, effective care. I provided oversight and direction on all aspects of patients care; plan of care was developed by myself. There were no concerns voiced by patient or therapy team during this treatment session.      Enriqueta Almonte, PT

## 2024-05-20 ENCOUNTER — TREATMENT (OUTPATIENT)
Dept: PHYSICAL THERAPY | Facility: HOSPITAL | Age: 24
End: 2024-05-20
Payer: COMMERCIAL

## 2024-05-20 DIAGNOSIS — S83.282A ACUTE LATERAL MENISCUS TEAR OF LEFT KNEE, INITIAL ENCOUNTER: ICD-10-CM

## 2024-05-20 DIAGNOSIS — M25.469 KNEE SWELLING: ICD-10-CM

## 2024-05-20 DIAGNOSIS — M62.559 ATROPHY OF QUADRICEPS FEMORIS MUSCLE: ICD-10-CM

## 2024-05-20 DIAGNOSIS — R29.898 LOWER EXTREMITY WEAKNESS: ICD-10-CM

## 2024-05-20 DIAGNOSIS — Z47.89 ORTHOPEDIC AFTERCARE: Primary | ICD-10-CM

## 2024-05-20 DIAGNOSIS — R26.89 BALANCE PROBLEMS: ICD-10-CM

## 2024-05-20 DIAGNOSIS — M25.662 DECREASED RANGE OF MOTION OF LEFT KNEE: ICD-10-CM

## 2024-05-20 DIAGNOSIS — R29.898 WEAKNESS OF LEFT LOWER EXTREMITY: ICD-10-CM

## 2024-05-20 DIAGNOSIS — R26.2 DIFFICULTY WALKING: ICD-10-CM

## 2024-05-20 DIAGNOSIS — M25.562 ACUTE PAIN OF LEFT KNEE: ICD-10-CM

## 2024-05-20 PROCEDURE — 97110 THERAPEUTIC EXERCISES: CPT | Mod: GP | Performed by: PHYSICAL THERAPIST

## 2024-05-20 NOTE — PROGRESS NOTES
Physical Therapy  Physical Therapy Orthopedic Progress Note    Patient Name: Diego Prasad  MRN: [id number]  Today's Date: 05/20/2024  Time Calculation  Start Time: 1030  Stop Time: 1130  Time Calculation (min): 60 min    Insurance:  Visit number: 27 of 30  Total visit count: 46  Authorization info: no auth  Insurance Type:  employee health plan; [employer] $3500 deductible    General:  Reason for visit: L ACLR PBTP 11/17/23 + BRANDEE/YARI 1/10/24  Referred by: Dr. Marquez    Current Problem  1. Orthopedic aftercare        2. Knee swelling        3. Atrophy of quadriceps femoris muscle        4. Decreased range of motion of left knee        5. Acute pain of left knee        6. Difficulty walking        7. Balance problems        8. Weakness of left lower extremity        9. Lower extremity weakness        10. Acute lateral meniscus tear of left knee, initial encounter                        Precautions: WBAT       Medical History Form: Reviewed (scanned into chart)    Subjective:   Pt noted a little soreness in knee from running yesterday.      DOS: 11/17/23  BRANDEE/YARI: 1/10/24    Pain:     Location: L knee  Description: dull      Objective:    Neuromuscular deficit test: 6%    Gait: unremarkable    Surgical sight inspected: No signs of infection; incisions all well healed        ROM    Knee flexion presession: 132  Sweep test: 0  Active extension to ~8deg          Strength Testing  Isokinetic Strength Testing (5/13/24) completed by Chante Zuleta ATC    Peak Torque Quads @ 60 deg/sec (goal for males: 100-125%, females: %)  R: 165 (98 % BW)  L: 88 (52 % BW)  Deficit: 47  LSI: 53%    Peak Torque HS @ 60 deg/sec (goals for males: 60% BW, females: 50%)  R: 93 (55 % BW)  L: 60 (36 % BW)  Deficit: 35   LSI: 65%    HS:Quad Ratio @ 60 deg/s (goals for males: 65%, females: 75%)  R: 56  L: 68    Peak Torque Quads @ 180 deg/sec  R: 127 (76 % BW)  L: 74 (44 % BW)  Deficit: 42  LSI: 58%    Peak Torque HS @ 180  "deg/sec  R: 71 (42 % BW)  L: 52 (31 % BW)  Deficit: 27  LSI: 73%     HS:Quad Ratio @ 180 deg/sec  R: 56  L: 70      LE Y-Balance Test        Pass: No      Left Avg Right Avg Difference* LSI   Anterior 53 /   54   / 61 56 62 /   64   / 71   65.67 10cm 85.2%   Posteromedial 117 /   118   / 106   113.37 122 /   120   / 122   121.3 4cm 93.7%   Posterolateral 104 /   99   / 98   100.3 102 /   107   / 100   103 3cm 97.3%   3 trials, record maximal reach in each direction  *Difference should be less than 4cm for return to sport; <4 cm = pass      Isometric Strength Testing (4/18/24)    Quads @ 60 deg knee flexion:  R: 164 (98 % BW)  L: 120 (71 % BW)  Deficit: 27%      HS @ 60 deg knee flexion:  R: 88 (52 % BW)  L: 65 (39 % BW)  Deficit: 26%      HS:Quad Ratio:  R: 54%  L: 54%      Patellar mobility: WNL    Palpation: tenderness to deep palpation over lateral arthroscopy portal      Outcome Measures:        EDUCATION: home exercise program, plan of care, activity modifications, pain management, and injury pathology       Plan of care was developed with input and agreement by the patient      Treatment Performed:    Therapeutic Exercise:    60min  Elliptical x5 min warm up  Dynamic warm up  Heel tap 2x10 r/l  PB HS curl 2x10  RESS 20lbs 5x5   Cross over step ups  3x12 12 in box   SL RDL 3x12  SL squat to 18 in box  5x5  Knee ext machine 5x5   SL prone HS curl machine 5x5  Ice to go    Not today:  Foam roll quad  Heel slides with strap 10\" x10  Quadruped rock back for knee flexion x15-20  TRX deep bodyweight squat x20  Walking lunges 2x20 yds  Shuttle DL hops 3x20  Shuttle alt SL hops 3x20  RFESS bodyweight x10 r/l, 3x6 18-26lbs r/l  SL RDL 26lbs 3x6  HS DL slider curls 3x8  Single leg press sled only with floss on L quad 2x20  Self gapping knee flexion stretch + floss 5\" holds x15  Sled push/pull x10yds x4  8\" step up 3x10-12 30-60lbs  BW squats + floss 2x10  Heel slides with self overpressure 3x30\"  SL HS curl 3xAMRAP (15, " "15, ) 15-25-35  Static lunge 2-3x12 r/l with assist as needed  SL ext 1xAMRAP 15# (15), 2x6-8 25#  Prone quad stretch 3x30\"  DL squat x10, goblet squat 20lbs with pause 2x6  4\" heel tap 3x10 + foam roller cue  DL press sled only 5\" hold flexion 3x10  Prone HS curl 3-5# 3x12  BFR 70% occlusion 30-15-15-15  BW squat with UE assist  15lb RDL   LAQ BW  Standing HS curl LLE 10\" hold 2x10  Step up 2x15 4\", 6\" step  Sit to stand single arm assist elevated table 3x10  DB RDL 15lbs 2x12  Prone HS curl active assisted x15  Weighted extension prop 5lb ankle weight x5 min 10\" on/off quad sets  TKE red band 5\" hold x20  Assisted squat for ROM x20  Standing knee flexion stretch with foot on 8 inch step- 20 reps  Seated active knee flexion over EOB- 20 reps  Quad set 20x5 sec hold (2x through)  Calf stretch with heel prop 4x30 sec hold  Shuttle 20x2 5 sec holds lv4  Assisted SLR 3x5  Long lever PB bridge 2x8      Manual Therapy:    0 min not today  Scar mobs (not today)  Tibial gapping  STM to lateral quad   Assisted flexion  Reviewed self gapping knee flexion stretch  Not today:  PROM knee flexion in sitting with STM to quad with stick- 10 minutes  Knee ext overpressure- 10 minutes  PROM knee flexion and ext  Assisted heel slides  Assisted knee flexion in short sitting  STM to quad short sitting  STM to distal hamstrings, popliteus, peripatellar region   Patellar mobs superior, inferior, medial,  (not today)      Neuromuscular Re-education:  0 min   - Mtrigger biofeedback goal 1200  Neuromuscular deficit test  SLR x5 min  Trailblazer game x7  min  NMES 10/50 Russian  Isometric extension at 90 into band x15 min  Medial lateral weight shift with UE support x20  Stride heel/toe raises L foot forward x20  Single crutch step through with contralateral rig support (decreasing) x20    Other: vaso   0 min - not today  Vaso medium cold temp mod compression x10 min in heel prop   Includes time for set up  Gait training - not " today        Assessment:  The patient tolerated treatment with minimal complaints.  Exercise targeted strength deficits and decreased endrange stability with deeper squatting.  Patient challenged by session.  Will continue to progress.  Jumping held due to soreness in knee from running yesterday.      Plan:  Knee flexion mobility  Double leg plyometrics  Continue single leg strength training   Continue to monitor effusion and return to run program    Re-isokinetic test in ~6 weeks, hop testing when functionally ready    Pt to decrease visit frequency to 1x week due to insurance visit limitations despite continued medical necessity of frequent intervention      HEP: [id number]       Rip Muro, PT

## 2024-05-28 ENCOUNTER — APPOINTMENT (OUTPATIENT)
Dept: PHYSICAL THERAPY | Facility: HOSPITAL | Age: 24
End: 2024-05-28
Payer: COMMERCIAL

## 2024-06-28 ENCOUNTER — LAB REQUISITION (OUTPATIENT)
Dept: LAB | Facility: HOSPITAL | Age: 24
End: 2024-06-28
Payer: COMMERCIAL

## 2024-06-28 DIAGNOSIS — N34.1 NONSPECIFIC URETHRITIS: ICD-10-CM

## 2024-06-28 PROCEDURE — 87491 CHLMYD TRACH DNA AMP PROBE: CPT

## 2024-06-28 PROCEDURE — 87661 TRICHOMONAS VAGINALIS AMPLIF: CPT

## 2024-06-28 PROCEDURE — 87591 N.GONORRHOEAE DNA AMP PROB: CPT

## 2024-06-29 LAB
C TRACH RRNA SPEC QL NAA+PROBE: POSITIVE
N GONORRHOEA DNA SPEC QL PROBE+SIG AMP: NEGATIVE
T VAGINALIS RRNA SPEC QL NAA+PROBE: NEGATIVE

## 2024-07-05 ENCOUNTER — OFFICE VISIT (OUTPATIENT)
Dept: PRIMARY CARE | Facility: CLINIC | Age: 24
End: 2024-07-05
Payer: COMMERCIAL

## 2024-07-05 VITALS
HEART RATE: 63 BPM | SYSTOLIC BLOOD PRESSURE: 117 MMHG | RESPIRATION RATE: 20 BRPM | DIASTOLIC BLOOD PRESSURE: 66 MMHG | OXYGEN SATURATION: 98 % | BODY MASS INDEX: 23.38 KG/M2 | WEIGHT: 172.6 LBS | HEIGHT: 72 IN

## 2024-07-05 DIAGNOSIS — A74.9 CHLAMYDIA: ICD-10-CM

## 2024-07-05 DIAGNOSIS — I44.1 SECOND DEGREE AV BLOCK: ICD-10-CM

## 2024-07-05 DIAGNOSIS — Z23 VACCINE FOR DIPHTHERIA-TETANUS: ICD-10-CM

## 2024-07-05 DIAGNOSIS — Z11.3 SCREEN FOR STD (SEXUALLY TRANSMITTED DISEASE): ICD-10-CM

## 2024-07-05 DIAGNOSIS — Z00.00 ROUTINE GENERAL MEDICAL EXAMINATION AT A HEALTH CARE FACILITY: Primary | ICD-10-CM

## 2024-07-05 PROCEDURE — 93000 ELECTROCARDIOGRAM COMPLETE: CPT | Performed by: FAMILY MEDICINE

## 2024-07-05 PROCEDURE — 99385 PREV VISIT NEW AGE 18-39: CPT | Performed by: FAMILY MEDICINE

## 2024-07-05 PROCEDURE — 3008F BODY MASS INDEX DOCD: CPT | Performed by: FAMILY MEDICINE

## 2024-07-05 PROCEDURE — 90471 IMMUNIZATION ADMIN: CPT | Performed by: FAMILY MEDICINE

## 2024-07-05 PROCEDURE — 90715 TDAP VACCINE 7 YRS/> IM: CPT | Performed by: FAMILY MEDICINE

## 2024-07-05 RX ORDER — DOXYCYCLINE 100 MG/1
100 TABLET ORAL 2 TIMES DAILY
COMMUNITY

## 2024-07-05 ASSESSMENT — PATIENT HEALTH QUESTIONNAIRE - PHQ9
SUM OF ALL RESPONSES TO PHQ9 QUESTIONS 1 AND 2: 0
2. FEELING DOWN, DEPRESSED OR HOPELESS: NOT AT ALL
1. LITTLE INTEREST OR PLEASURE IN DOING THINGS: NOT AT ALL

## 2024-07-05 NOTE — PROGRESS NOTES
Subjective   Patient ID: Diego Prasad is a 23 y.o. male who presents for New Patient Visit (Establish care/).    Last Physical : __1__ Years ago     Pt's PMH, PSH, SH, FH , meds and allergies was obtained / reviewed and updated .     Dental  Visits : Y  Vision issues : N  Hearing issues : N    Immunizations : Y    Diet :  Could be better   Exercise:  Not regularly  Weight concerns : None    Alcohol: as noted in SH  Tobacco: as noted in SH  Recreational drugs :  None /as noted in SH     Sexually active : Active   Contraception :   Erectile dysfunction :    Colorectal cancer screening   Prostate cancer screening     Metabolic screening   - Lipids   - Glucose   New patient here to establish care  Due for Tdap up-to-date with immunization  Is on doxycycline for chlamydia is on his fifth day needs to be retested has not been sexually active  Has history of second-degree heart block  Denies any complaints has had episode of dizziness in 2019 was seen by cardiology is not sure what kind of follow-up was needed  Status post ACL repair doing well  ==================================    Visit Vitals  /66   Pulse 63   Resp 20   Ht 1.829 m (6')   Wt 78.3 kg (172 lb 9.6 oz)   SpO2 98%   BMI 23.41 kg/m²   Smoking Status Never   BSA 1.99 m²      =====================  Review of Systems:    Constitutional: no chills, no fever and no night sweats.     Eyes: no blurred vision and no eyesight problems.     ENT: no hearing loss, no nasal congestion, no nasal discharge, no hoarseness and no sore throat.     Cardiovascular: no chest pain, no intermittent leg claudication, no lower extremity edema, no palpitations and no syncope.     Respiratory: no cough, no shortness of breath during exertion, no shortness of breath at rest and no wheezing.     Gastrointestinal: no abdominal pain, no constipation, no blood in stools, no diarrhea, no melena, no nausea, no rectal pain and no vomiting.     Genitourinary: no dysuria, no change  in urinary frequency, no urinary hesitancy and no feelings of urinary urgency.     Musculoskeletal: Knee pain.     Integumentary: no new skin lesions and no rashes.     Neurological: no difficulty walking, no headache, no limb weakness, no numbness and no tingling.     Psychiatric: no anxiety, no depression, no anhedonia and no substance use disorders.     Endocrine: no recent weight gain and no recent weight loss.     Hematologic/Lymphatic: no tendency for easy bruising and no swollen glands.          All other systems have been reviewed and are negative for complaint.    =====================================================    Physical exam :    Constitutional: Alert and in no acute distress. Well developed, well nourished.     Eyes: Normal external exam. Pupils were equal in size, round, reactive to light (PERRL) with normal accommodation and extraocular movements intact (EOMI).     Ears, Nose, Mouth, and Throat: External inspection of ears and nose: Normal.  Otoscopic examination: Normal.      Neck: No neck mass was observed. Supple.     Cardiovascular: Normal rhythm, normal S1 and S2, no gallops, no murmurs and no pericardial rub    Pulmonary: No respiratory distress. Clear bilateral breath sounds.     Abdomen: Soft nontender; no abdominal mass palpated. No organomegaly.     Musculoskeletal: Knee slight swelling    Skin: Normal skin color and pigmentation, normal skin turgor, and no rash.     Neurologic: Deep tendon reflexes were 2+ and symmetric. Sensation: Normal.     Psychiatric: Judgment and insight: Intact. Mood and affect: Normal.    Lymphatic : Cervical/ axillary/ groin Lns Palpable/ non palpable       Assessment/Plan    Problem List Items Addressed This Visit             ICD-10-CM    Second degree AV block I44.1    Relevant Orders    ECG 12 lead (Clinic Performed)    Chlamydia A74.9     Will finish doxycycline and check urine and blood work after the medication is finished and get labs done midweek next  week use condoms at all times         Vaccine for diphtheria-tetanus Z23    Relevant Orders    Tdap vaccine, age 7 years and older (Completed)    Screen for STD (sexually transmitted disease) Z11.3    Relevant Orders    Trichomonas vaginalis, Nucleic Acid Detection    HSV1 IgG and HSV2 IgG    Syphilis Screen with Reflex    HIV 1/2 Antigen/Antibody Screen with Reflex to Confirmation    C. Trachomatis / N. Gonorrhoeae, Amplified Detection    Routine general medical examination at a health care facility - Primary Z00.00     Patient Discussion/Summary    Today's discussion topics included, but were not limited to the following:.   Diego's growth and development are appropriate for age.   Immunizations: Immunizations are up to date.   Anticipatory Guidance: Adolescent health and safety topics were reviewed.       Impression: Your growth and development is appropriate for age. According to the Body Mass Index (BMI) classification, you are between the 5th and 84th percentile. Health and safety topics were reviewed. Promoted healthy habits through brushing and flossing teeth, visiting a dentist twice a year, limiting TV/screen time , protecting hearing at work, home and concerts, supporting a positive body image, eating a balanced diet and participating in physical activities 60 min daily. Reviewed family time, community involvement and friends/relationships. Discussed dealing with stress, mood changes and sexuality. Topics discussed to support healthy behavior choices included: tobacco,inhalants, alcohol, drugs, prescription drugs, abstinence and/or safe sex, use of seat belts, gun safety, conflict resolution, sports/recreation safety, sun safety and Internet and social media safety.   Encourage positive age-appropriate and supportive friendships. Encourage open communication with parents, family and friends.     Recommend yearly physicals to promote well-being and healthy living    We discussed the lack of protection  from STDs, risks, benefits and alternatives. Remember to use a condom to prevent unwanted pregnancies or elidia an STD.     Thank you for the opportunity and privilege to provide your medical care. I appreciate your trust and confidence in my ability and experience. Thank you again and I look forward to seeing and working with you in the future. Stay healthy and happy!!

## 2024-07-06 PROBLEM — Z23 VACCINE FOR DIPHTHERIA-TETANUS: Status: ACTIVE | Noted: 2024-07-06

## 2024-07-06 PROBLEM — Z11.3 SCREEN FOR STD (SEXUALLY TRANSMITTED DISEASE): Status: ACTIVE | Noted: 2024-07-06

## 2024-07-06 PROBLEM — Z00.00 ROUTINE GENERAL MEDICAL EXAMINATION AT A HEALTH CARE FACILITY: Status: ACTIVE | Noted: 2024-07-06

## 2024-07-06 NOTE — ASSESSMENT & PLAN NOTE
Will finish doxycycline and check urine and blood work after the medication is finished and get labs done midweek next week use condoms at all times

## 2024-07-06 NOTE — ASSESSMENT & PLAN NOTE
Patient Discussion/Summary    Today's discussion topics included, but were not limited to the following:.   Diego's growth and development are appropriate for age.   Immunizations: Immunizations are up to date.   Anticipatory Guidance: Adolescent health and safety topics were reviewed.       Impression: Your growth and development is appropriate for age. According to the Body Mass Index (BMI) classification, you are between the 5th and 84th percentile. Health and safety topics were reviewed. Promoted healthy habits through brushing and flossing teeth, visiting a dentist twice a year, limiting TV/screen time , protecting hearing at work, home and concerts, supporting a positive body image, eating a balanced diet and participating in physical activities 60 min daily. Reviewed family time, community involvement and friends/relationships. Discussed dealing with stress, mood changes and sexuality. Topics discussed to support healthy behavior choices included: tobacco,inhalants, alcohol, drugs, prescription drugs, abstinence and/or safe sex, use of seat belts, gun safety, conflict resolution, sports/recreation safety, sun safety and Internet and social media safety.   Encourage positive age-appropriate and supportive friendships. Encourage open communication with parents, family and friends.     Recommend yearly physicals to promote well-being and healthy living    We discussed the lack of protection from STDs, risks, benefits and alternatives. Remember to use a condom to prevent unwanted pregnancies or elidia an STD.     Thank you for the opportunity and privilege to provide your medical care. I appreciate your trust and confidence in my ability and experience. Thank you again and I look forward to seeing and working with you in the future. Stay healthy and happy!!

## 2024-07-11 ENCOUNTER — LAB (OUTPATIENT)
Dept: LAB | Facility: LAB | Age: 24
End: 2024-07-11
Payer: COMMERCIAL

## 2024-07-11 DIAGNOSIS — Z11.3 SCREEN FOR STD (SEXUALLY TRANSMITTED DISEASE): ICD-10-CM

## 2024-07-11 PROCEDURE — 87591 N.GONORRHOEAE DNA AMP PROB: CPT

## 2024-07-11 PROCEDURE — 87389 HIV-1 AG W/HIV-1&-2 AB AG IA: CPT

## 2024-07-11 PROCEDURE — 86780 TREPONEMA PALLIDUM: CPT

## 2024-07-11 PROCEDURE — 86695 HERPES SIMPLEX TYPE 1 TEST: CPT

## 2024-07-11 PROCEDURE — 87491 CHLMYD TRACH DNA AMP PROBE: CPT

## 2024-07-11 PROCEDURE — 36415 COLL VENOUS BLD VENIPUNCTURE: CPT

## 2024-07-11 PROCEDURE — 87661 TRICHOMONAS VAGINALIS AMPLIF: CPT

## 2024-07-11 PROCEDURE — 86696 HERPES SIMPLEX TYPE 2 TEST: CPT

## 2024-07-12 LAB
C TRACH RRNA SPEC QL NAA+PROBE: NEGATIVE
HERPES SIMPLEX VIRUS 1 IGG: 0.2 INDEX
HERPES SIMPLEX VIRUS 2 IGG: <0.2 INDEX
HIV 1+2 AB+HIV1 P24 AG SERPL QL IA: NONREACTIVE
N GONORRHOEA DNA SPEC QL PROBE+SIG AMP: NEGATIVE
T VAGINALIS RRNA SPEC QL NAA+PROBE: NEGATIVE
TREPONEMA PALLIDUM IGG+IGM AB [PRESENCE] IN SERUM OR PLASMA BY IMMUNOASSAY: NONREACTIVE

## 2024-07-29 ENCOUNTER — APPOINTMENT (OUTPATIENT)
Dept: ORTHOPEDIC SURGERY | Facility: HOSPITAL | Age: 24
End: 2024-07-29
Payer: COMMERCIAL

## 2024-07-30 ENCOUNTER — APPOINTMENT (OUTPATIENT)
Dept: PRIMARY CARE | Facility: CLINIC | Age: 24
End: 2024-07-30
Payer: COMMERCIAL

## 2024-08-21 NOTE — PROGRESS NOTES
Physical Therapy  Discharge Summary    Referral/Discharge Info:  Date of Discharge: 8/21/24  Date of Last Visit: 5/20/24  Date of Evaluation: 10/16/24  Number of Visits Attended: 47  Referred by: Dr. Marquez  Referred for: L ACLR     Problems/Issues Addressed:  ROM, strength, functional mechanics, return to run, effusion management, gait training, neuro re-ed      Status at Discharge:  Unable to formally assess; progressing into return to run program and making progress in quad/hamstring strength    Reason for Discharge:  Failed to schedule additional follow ups (Has not been seen in > 30 days)         Enriqueta Almonte, PT

## 2025-06-11 ENCOUNTER — OFFICE VISIT (OUTPATIENT)
Dept: URGENT CARE | Age: 25
End: 2025-06-11
Payer: COMMERCIAL

## 2025-06-11 VITALS
SYSTOLIC BLOOD PRESSURE: 116 MMHG | WEIGHT: 170 LBS | OXYGEN SATURATION: 98 % | RESPIRATION RATE: 14 BRPM | DIASTOLIC BLOOD PRESSURE: 67 MMHG | HEIGHT: 72 IN | HEART RATE: 61 BPM | BODY MASS INDEX: 23.03 KG/M2 | TEMPERATURE: 98.7 F

## 2025-06-11 DIAGNOSIS — N34.2 URETHRITIS: Primary | ICD-10-CM

## 2025-06-11 LAB
POC APPEARANCE, URINE: ABNORMAL
POC BILIRUBIN, URINE: NEGATIVE
POC BLOOD, URINE: NEGATIVE
POC COLOR, URINE: YELLOW
POC GLUCOSE, URINE: NEGATIVE MG/DL
POC KETONES, URINE: NEGATIVE MG/DL
POC LEUKOCYTES, URINE: ABNORMAL
POC NITRITE,URINE: NEGATIVE
POC PH, URINE: 6.5 PH
POC PROTEIN, URINE: NEGATIVE MG/DL
POC SPECIFIC GRAVITY, URINE: 1.01
POC UROBILINOGEN, URINE: 0.2 EU/DL

## 2025-06-11 ASSESSMENT — PAIN SCALES - GENERAL: PAINLEVEL_OUTOF10: 0-NO PAIN

## 2025-06-11 NOTE — PROGRESS NOTES
Subjective   Patient ID: Diego Prasad is a 24 y.o. male. They present today with a chief complaint of Exposure to STD (Sx of discharge and some itchiness. Over a few weeks. ).    History of Present Illness    Exposure to STD    This is a 24-year-old male presents to urgent care due to concerns of STD.  Patient states that last time he sexually active was November/December 2024.  States for the last month he is having intermittent penile discharge.  Does have dysuria intermittently.  There is no itchiness.  Denies rash, neurosymptoms, or testicular pain.  Past Medical History  Allergies as of 06/11/2025    (No Known Allergies)       Prescriptions Prior to Admission[1]     Medical History[2]    Surgical History[3]     reports that he has never smoked. He has never used smokeless tobacco. He reports current alcohol use of about 5.0 standard drinks of alcohol per week. He reports that he does not currently use drugs.    Review of Systems  Review of Systems   All other systems reviewed and are negative.                                 Objective    Vitals:    06/11/25 1334   BP: 116/67   Pulse: 61   Resp: 14   Temp: 37.1 °C (98.7 °F)   SpO2: 98%   Weight: 77.1 kg (170 lb)   Height: 1.829 m (6')     No LMP for male patient.    Physical Exam  Physical Exam    General: Vitals noted, no distress.      Cardiac: Regular, rate, rhythm    Pulmonary: Lungs clear bilaterally with good aeration. No adventitious breath sounds.    Abdomen: Soft, nontender, nonsurgical. No peritoneal signs. Normoactive bowel sounds.    : Circumcised male. Vertical, nontender testicles. No inguinal lymphadenopathy. No inguinal mass. No hernia. No discharge, drainage, ulcerations, lesions, rash. No evidence of Isabelle's gangrene.    Extremities: No peripheral edema.    Skin: No rash.    Neuro: No gross neurological deficits.  Procedures    Point of Care Test & Imaging Results from this visit  Results for orders placed or performed in visit on  06/11/25   POCT UA Automated manually resulted   Result Value Ref Range    POC Color, Urine Yellow Straw, Yellow, Light-Yellow    POC Appearance, Urine Cloudy (A) Clear    POC Glucose, Urine NEGATIVE NEGATIVE mg/dl    POC Bilirubin, Urine NEGATIVE NEGATIVE    POC Ketones, Urine NEGATIVE NEGATIVE mg/dl    POC Specific Gravity, Urine 1.015 1.005 - 1.035    POC Blood, Urine NEGATIVE NEGATIVE    POC PH, Urine 6.5 No Reference Range Established PH    POC Protein, Urine NEGATIVE NEGATIVE mg/dl    POC Urobilinogen, Urine 0.2 0.2, 1.0 EU/DL    Poc Nitrite, Urine NEGATIVE NEGATIVE    POC Leukocytes, Urine SMALL (1+) (A) NEGATIVE      Imaging  No results found.    Cardiology, Vascular, and Other Imaging  No other imaging results found for the past 2 days      Diagnostic study results (if any) were reviewed by Marlon Sharp PA-C.    Assessment/Plan   Allergies, medications, history, and pertinent labs/EKGs/Imaging reviewed by Marlon Sharp PA-C.   MDM:  Summary: Patient is here for STI concern. Patient is well-appearing and nontoxic on exam. Differential diagnosis includes not limited to UTI, gonorrhea, chlamydia, trichomonas, or mycoplasma.  Urine dip unremarkable.  Discussed safe sex practices.  Urine dip demonstrates leukocytes but no other signs of infection.  Urine sent to lab for culture and STD testing.  We will call for positive results and treat appropriately.  Follow-up with PCP. Return to urgent care or go to the emergency department if symptoms worsen or if new symptoms develop          Orders and Diagnoses  Diagnoses and all orders for this visit:  Urethritis  -     POCT UA Automated manually resulted  -     C. trachomatis / N. gonorrhoeae, Amplified, Urogenital  -     Trichomonas vaginalis, Amplified  -     Mycoplasma/Ureaplasma PCR, Urogenital; Future  -     Urine Culture      Medical Admin Record      Patient disposition: Home    Electronically signed by Marlon Sharp PA-C  1:53 PM           [1] (Not in a  hospital admission)   [2]   Past Medical History:  Diagnosis Date    Anxiety    [3]   Past Surgical History:  Procedure Laterality Date    ANTERIOR CRUCIATE LIGAMENT REPAIR      left leg

## 2025-06-11 NOTE — PATIENT INSTRUCTIONS
"Urethritis    The Basics  Written by the doctors and editors at [facility]  What is urethritis?  The urethra is the tube that carries urine from the bladder to the outside of the body (figure 1). Urethritis is when the urethra gets irritated or inflamed.  People who have urethritis can have pain, burning, or stinging when urinating. They also sometimes have discharge, meaning they leak fluid from the penis or vagina. Males with urethritis can have redness or swelling at the tip of the penis.  What causes urethritis?  Urethritis is usually caused by an infection. The most common cause is a sexually transmitted infection (\"STI\"). STIs are infections you can catch during sex.  STIs that can cause urethritis include:  ? Chlamydia  ? Gonorrhea  ? Mycoplasma genitalium  ? Trichomoniasis  Should I see a doctor or nurse?  Yes. If you have symptoms of urethritis, see a doctor or nurse.  Will I need tests?  Probably. Your doctor or nurse will do an exam. This might include a pelvic exam for females. They will also ask about your symptoms.  Tests might include:  ? Urine tests  ? Tests on a sample of fluid from the vagina or penis  ? Blood tests  These can show if you have an infection and what kind.  How is urethritis treated?  Treatment usually involves taking antibiotics. These can come as pills, a single shot, or both pills and a shot. If your doctor or nurse thinks you have urethritis, you will probably get treatment right away. You do not need to wait until your test results come back.  If you are treated for an STI, do not have sex until 7 days after you start antibiotics and until you have no more symptoms.  When you learn you have an STI, tell all the people you had sex with recently. They might also have the infection (even if they have no symptoms) and need treatment.  Can urethritis be prevented?  Since urethritis is usually caused by an STI, you can reduce your chances of getting urethritis again by:  ? Using a latex " condom correctly every time you have sex (figure 2)  ? Avoiding sex when you or your partner has any symptoms that could be caused by an infection (such as itching, discharge, or pain with urination)  ? Not having sex  If you had an STI at any time, your doctor or nurse might also check you for other STIs now or in the future. People can get more than 1 STI at a time. Plus, STIs do not always cause symptoms, so it helps to check.

## 2025-06-12 ENCOUNTER — TELEPHONE (OUTPATIENT)
Dept: URGENT CARE | Age: 25
End: 2025-06-12

## 2025-06-12 DIAGNOSIS — A74.9 CHLAMYDIA: Primary | ICD-10-CM

## 2025-06-12 RX ORDER — DOXYCYCLINE 100 MG/1
100 CAPSULE ORAL 2 TIMES DAILY
Qty: 14 CAPSULE | Refills: 0 | Status: SHIPPED | OUTPATIENT
Start: 2025-06-12 | End: 2025-06-19

## 2025-06-13 LAB
BACTERIA UR CULT: NORMAL
C TRACH RRNA SPEC QL NAA+PROBE: DETECTED
N GONORRHOEA RRNA SPEC QL NAA+PROBE: NOT DETECTED
QUEST GC CT AMPLIFIED (ALWAYS MESSAGE): ABNORMAL
T VAGINALIS RRNA SPEC QL NAA+PROBE: NOT DETECTED

## (undated) DEVICE — TUBING, NFLOW, FMS VUE, SNGL USE, DISP, LF

## (undated) DEVICE — DRAPE, SHEET, 17 X 23 IN

## (undated) DEVICE — SOLUTION, IRRI GATION, LACTATED RINGERS, 3000 ML, BAG

## (undated) DEVICE — BANDAGE, ELASTIC, MATRIX, SELF-CLOSURE, 4 IN X 5 YD, LF

## (undated) DEVICE — Device

## (undated) DEVICE — SYRINGE, 60 CC, IRRIGATION, BULB, CONTRO-BULB, PAPER POUCH

## (undated) DEVICE — SUTURE, VICRYL, 0, 27 IN, CT-1, UNDYED

## (undated) DEVICE — DRESSING, ABDOMINAL, TENDERSORB, 8 X 10 IN, STERILE

## (undated) DEVICE — SUTURE, MONOCRYL, 4-0, 18 IN, PS2, UNDYED

## (undated) DEVICE — PREP TRAY, VAGINAL

## (undated) DEVICE — PADDING, WEBRIL, UNDERCAST, STERILE, 4 IN

## (undated) DEVICE — PADDING, WEBRIL, UNDERCAST, STERILE, 6 IN

## (undated) DEVICE — MARKER, SURGICAL, SKIN, STANDARD, W/RULER, LF

## (undated) DEVICE — STRIP, SKIN CLOSURE, STERI STRIP, REINFORCED, 0.5 X 4 IN

## (undated) DEVICE — BANDAGE, ELASTIC, MATRIX, SELF-CLOSURE, 6 IN X 5 YD, LF

## (undated) DEVICE — SUTURE, CTD, VICRYL, 2-0, UND, BR, CT-2

## (undated) DEVICE — DRAPE, TOWEL, STERI DRAPE, 17 X 11 IN, PLASTIC, STERILE

## (undated) DEVICE — ELECTRODE, ELECTROSURGICAL, PENCIL, HAND CONTROL, BLADE, W/SMOKE EVACUATION, W/HOLSTER, 10 FT CORD

## (undated) DEVICE — COVER HANDLE LIGHT, STERIS, BLUE, STERILE

## (undated) DEVICE — DRAPE, SHEET, THREE QUARTER, FAN FOLD, 57 X 77 IN

## (undated) DEVICE — DRILL BIT, 1.6 X 128MM

## (undated) DEVICE — APPLICATOR, CHLORAPREP, W/ORANGE TINT, 26ML

## (undated) DEVICE — TUBING, SUCTION, NON-CONDUCTIVE, W/CONNECT,.25 IN X 12 FT, STERILE, LF

## (undated) DEVICE — GLOVE, SURGICAL, PROTEXIS PI ORTHO, 7.0, PF, LF

## (undated) DEVICE — MAT, FLOOR, SURGISAFE, FLUID CONTROL, 46X40

## (undated) DEVICE — SUTURE, ETHILON, 4-0, 18, PS2, BLACK

## (undated) DEVICE — ARTHROWAND, AMBIENT SUPER MULTIVAC 50

## (undated) DEVICE — SUTURE, PDS II, 3-0, 18 IN, PS2, CLEAR

## (undated) DEVICE — KIT, MINOR, DOUBLE BASIN

## (undated) DEVICE — SUTURE, PROLENE, 3-0, 18 IN, PS2, BLUE

## (undated) DEVICE — DRAPE, SHEET, FAN FOLDED, MEDIUM, 44 X 72 IN, DISPOSABLE, LF, STERILE

## (undated) DEVICE — GLOVE, SURGICAL, PROTEXIS PI MICRO, 7.0, PF, LF

## (undated) DEVICE — GLOVE, SURGICAL, SYNTHETIC, DERMAPRENE, 7, PF, LF, GREEN

## (undated) DEVICE — SYRINGE, LUER LOCK, 12ML

## (undated) DEVICE — CONTAINER, SPECIMEN, 4 OZ, OR PEEL PACK, STERILE

## (undated) DEVICE — TIP, SUCTION, YANKAUER, FLEXIBLE

## (undated) DEVICE — ENDOBUTTON, ACL CL PAC

## (undated) DEVICE — SOLUTION, IRRIGATION, SODIUM CHLORIDE 0.9%, 1000 ML, POUR BOTTLE